# Patient Record
Sex: FEMALE | Race: WHITE | NOT HISPANIC OR LATINO | Employment: OTHER | URBAN - METROPOLITAN AREA
[De-identification: names, ages, dates, MRNs, and addresses within clinical notes are randomized per-mention and may not be internally consistent; named-entity substitution may affect disease eponyms.]

---

## 2024-11-29 ENCOUNTER — HOSPITAL ENCOUNTER (INPATIENT)
Facility: HOSPITAL | Age: 79
LOS: 4 days | Discharge: HOME | DRG: 280 | End: 2024-12-03
Attending: EMERGENCY MEDICINE | Admitting: SPECIALIST
Payer: MEDICARE

## 2024-11-29 ENCOUNTER — DOCUMENTATION ONLY (OUTPATIENT)
Dept: CARDIOLOGY | Facility: CLINIC | Age: 79
End: 2024-11-29
Payer: MEDICARE

## 2024-11-29 DIAGNOSIS — I21.4 NSTEMI (NON-ST ELEVATED MYOCARDIAL INFARCTION): ICD-10-CM

## 2024-11-29 DIAGNOSIS — I50.9 CHF (CONGESTIVE HEART FAILURE): ICD-10-CM

## 2024-11-29 DIAGNOSIS — R06.02 SHORTNESS OF BREATH: ICD-10-CM

## 2024-11-29 DIAGNOSIS — R00.0 REGULAR WIDE QRS COMPLEX TACHYCARDIA: ICD-10-CM

## 2024-11-29 DIAGNOSIS — I25.10 CORONARY ARTERY DISEASE, UNSPECIFIED VESSEL OR LESION TYPE, UNSPECIFIED WHETHER ANGINA PRESENT, UNSPECIFIED WHETHER NATIVE OR TRANSPLANTED HEART: ICD-10-CM

## 2024-11-29 DIAGNOSIS — R07.9 CHEST PAIN: ICD-10-CM

## 2024-11-29 DIAGNOSIS — J96.01 ACUTE HYPOXEMIC RESPIRATORY FAILURE: Primary | ICD-10-CM

## 2024-11-29 PROBLEM — C50.919 BREAST CANCER: Status: ACTIVE | Noted: 2024-11-29

## 2024-11-29 PROBLEM — I10 PRIMARY HYPERTENSION: Status: ACTIVE | Noted: 2024-11-29

## 2024-11-29 PROBLEM — J44.9 COPD (CHRONIC OBSTRUCTIVE PULMONARY DISEASE): Status: ACTIVE | Noted: 2024-11-29

## 2024-11-29 PROBLEM — R79.89 ELEVATED TROPONIN: Status: ACTIVE | Noted: 2024-11-29

## 2024-11-29 PROBLEM — I44.7 LBBB (LEFT BUNDLE BRANCH BLOCK): Status: ACTIVE | Noted: 2024-11-29

## 2024-11-29 PROBLEM — N18.30 CKD (CHRONIC KIDNEY DISEASE), STAGE III: Status: ACTIVE | Noted: 2024-11-29

## 2024-11-29 PROBLEM — D63.8 ANEMIA OF CHRONIC DISEASE: Status: ACTIVE | Noted: 2024-11-29

## 2024-11-29 LAB
ALBUMIN SERPL BCP-MCNC: 3.7 G/DL (ref 3.5–5.2)
ALLENS TEST: ABNORMAL
ALP SERPL-CCNC: 95 U/L (ref 40–150)
ALT SERPL W/O P-5'-P-CCNC: 41 U/L (ref 10–44)
ANION GAP SERPL CALC-SCNC: 11 MMOL/L (ref 8–16)
AORTIC ROOT ANNULUS: 3.17 CM
APTT PPP: 25.1 SEC (ref 21–32)
APTT PPP: 45.2 SEC (ref 21–32)
APTT PPP: 47 SEC (ref 21–32)
ASCENDING AORTA: 2.45 CM
AST SERPL-CCNC: 48 U/L (ref 10–40)
AV INDEX (PROSTH): 0.4
AV MEAN GRADIENT: 21.5 MMHG
AV PEAK GRADIENT: 36 MMHG
AV REGURGITATION PRESSURE HALF TIME: 494.19 MS
AV VALVE AREA BY VELOCITY RATIO: 1.5 CM²
AV VALVE AREA: 1.5 CM²
AV VELOCITY RATIO: 0.4
BACTERIA #/AREA URNS HPF: NORMAL /HPF
BASOPHILS # BLD AUTO: 0.06 K/UL (ref 0–0.2)
BASOPHILS NFR BLD: 1 % (ref 0–1.9)
BILIRUB SERPL-MCNC: 0.8 MG/DL (ref 0.1–1)
BILIRUB UR QL STRIP: NEGATIVE
BNP SERPL-MCNC: 1010 PG/ML (ref 0–99)
BSA FOR ECHO PROCEDURE: 2.02 M2
BUN SERPL-MCNC: 33 MG/DL (ref 8–23)
CALCIUM SERPL-MCNC: 9.7 MG/DL (ref 8.7–10.5)
CHLORIDE SERPL-SCNC: 110 MMOL/L (ref 95–110)
CLARITY UR: CLEAR
CO2 SERPL-SCNC: 20 MMOL/L (ref 23–29)
COLOR UR: YELLOW
CREAT SERPL-MCNC: 2 MG/DL (ref 0.5–1.4)
CV ECHO LV RWT: 0.6 CM
DELSYS: ABNORMAL
DIFFERENTIAL METHOD BLD: ABNORMAL
DOP CALC AO PEAK VEL: 3 M/S
DOP CALC AO VTI: 82.4 CM
DOP CALC LVOT AREA: 3.8 CM2
DOP CALC LVOT DIAMETER: 2.2 CM
DOP CALC LVOT PEAK VEL: 1.2 M/S
DOP CALC LVOT STROKE VOLUME: 123.9 CM3
DOP CALC RVOT PEAK VEL: 1.18 M/S
DOP CALC RVOT VTI: 27.3 CM
DOP CALCLVOT PEAK VEL VTI: 32.6 CM
E WAVE DECELERATION TIME: 193.31 MSEC
E/A RATIO: 0.73
E/E' RATIO: 19.45 M/S
ECHO LV POSTERIOR WALL: 1.5 CM (ref 0.6–1.1)
EJECTION FRACTION: 55 %
EOSINOPHIL # BLD AUTO: 0.1 K/UL (ref 0–0.5)
EOSINOPHIL NFR BLD: 0.8 % (ref 0–8)
EP: 6
ERYTHROCYTE [DISTWIDTH] IN BLOOD BY AUTOMATED COUNT: 16.1 % (ref 11.5–14.5)
ERYTHROCYTE [SEDIMENTATION RATE] IN BLOOD BY WESTERGREN METHOD: 18 MM/H
EST. GFR  (NO RACE VARIABLE): 25 ML/MIN/1.73 M^2
ESTIMATED AVG GLUCOSE: 100 MG/DL (ref 68–131)
FIO2: 40
FRACTIONAL SHORTENING: 30 % (ref 28–44)
GLUCOSE SERPL-MCNC: 139 MG/DL (ref 70–110)
GLUCOSE UR QL STRIP: ABNORMAL
HBA1C MFR BLD: 5.1 % (ref 4–5.6)
HCO3 UR-SCNC: 23.5 MMOL/L (ref 24–28)
HCT VFR BLD AUTO: 30.3 % (ref 37–48.5)
HCV AB SERPL QL IA: NEGATIVE
HEP C VIRUS HOLD SPECIMEN: NORMAL
HGB BLD-MCNC: 9.6 G/DL (ref 12–16)
HGB UR QL STRIP: NEGATIVE
HIV 1+2 AB+HIV1 P24 AG SERPL QL IA: NEGATIVE
HYALINE CASTS #/AREA URNS LPF: 0 /LPF
IMM GRANULOCYTES # BLD AUTO: 0.01 K/UL (ref 0–0.04)
IMM GRANULOCYTES NFR BLD AUTO: 0.2 % (ref 0–0.5)
INR PPP: 1 (ref 0.8–1.2)
INTERVENTRICULAR SEPTUM: 1.4 CM (ref 0.6–1.1)
IP: 12
IVC DIAMETER: 2.24 CM
IVRT: 85.63 MSEC
KETONES UR QL STRIP: NEGATIVE
LA MAJOR: 5.91 CM
LA MINOR: 5.65 CM
LA WIDTH: 5.2 CM
LACTATE SERPL-SCNC: 0.9 MMOL/L (ref 0.5–2.2)
LEFT ATRIUM AREA SYSTOLIC (APICAL 2 CHAMBER): 22.91 CM2
LEFT ATRIUM AREA SYSTOLIC (APICAL 4 CHAMBER): 23.18 CM2
LEFT ATRIUM SIZE: 3.94 CM
LEFT ATRIUM VOLUME INDEX MOD: 39 ML/M2
LEFT ATRIUM VOLUME INDEX: 51.9 ML/M2
LEFT ATRIUM VOLUME MOD: 75.65 ML
LEFT ATRIUM VOLUME: 100.61 CM3
LEFT INTERNAL DIMENSION IN SYSTOLE: 3.5 CM (ref 2.1–4)
LEFT VENTRICLE DIASTOLIC VOLUME INDEX: 60.8 ML/M2
LEFT VENTRICLE DIASTOLIC VOLUME: 117.96 ML
LEFT VENTRICLE END SYSTOLIC VOLUME APICAL 2 CHAMBER: 73.84 ML
LEFT VENTRICLE END SYSTOLIC VOLUME APICAL 4 CHAMBER: 74.49 ML
LEFT VENTRICLE MASS INDEX: 158.1 G/M2
LEFT VENTRICLE SYSTOLIC VOLUME INDEX: 25.4 ML/M2
LEFT VENTRICLE SYSTOLIC VOLUME: 49.23 ML
LEFT VENTRICULAR INTERNAL DIMENSION IN DIASTOLE: 5 CM (ref 3.5–6)
LEFT VENTRICULAR MASS: 306.8 G
LEUKOCYTE ESTERASE UR QL STRIP: NEGATIVE
LV LATERAL E/E' RATIO: 15.29 M/S
LV SEPTAL E/E' RATIO: 26.75 M/S
LVED V (TEICH): 117.96 ML
LVES V (TEICH): 49.23 ML
LVOT MG: 2.69 MMHG
LVOT MV: 0.77 CM/S
LYMPHOCYTES # BLD AUTO: 0.6 K/UL (ref 1–4.8)
LYMPHOCYTES NFR BLD: 9.6 % (ref 18–48)
MCH RBC QN AUTO: 34.2 PG (ref 27–31)
MCHC RBC AUTO-ENTMCNC: 31.7 G/DL (ref 32–36)
MCV RBC AUTO: 108 FL (ref 82–98)
MICROSCOPIC COMMENT: NORMAL
MODE: ABNORMAL
MONOCYTES # BLD AUTO: 0.5 K/UL (ref 0.3–1)
MONOCYTES NFR BLD: 8.8 % (ref 4–15)
MV PEAK A VEL: 1.47 M/S
MV PEAK E VEL: 1.07 M/S
MV STENOSIS PRESSURE HALF TIME: 56.06 MS
MV VALVE AREA P 1/2 METHOD: 3.92 CM2
NEUTROPHILS # BLD AUTO: 4.7 K/UL (ref 1.8–7.7)
NEUTROPHILS NFR BLD: 79.6 % (ref 38–73)
NITRITE UR QL STRIP: NEGATIVE
NRBC BLD-RTO: 0 /100 WBC
OHS QRS DURATION: 148 MS
OHS QTC CALCULATION: 513 MS
PCO2 BLDA: 45.2 MMHG (ref 35–45)
PH SMN: 7.32 [PH] (ref 7.35–7.45)
PH UR STRIP: 6 [PH] (ref 5–8)
PISA AR MAX VEL: 3.83 M/S
PLATELET # BLD AUTO: 227 K/UL (ref 150–450)
PMV BLD AUTO: 10.8 FL (ref 9.2–12.9)
PO2 BLDA: 77 MMHG (ref 80–100)
POC BE: -3 MMOL/L
POC SATURATED O2: 94 % (ref 95–100)
POTASSIUM SERPL-SCNC: 4.7 MMOL/L (ref 3.5–5.1)
PROT SERPL-MCNC: 6.9 G/DL (ref 6–8.4)
PROT UR QL STRIP: ABNORMAL
PROTHROMBIN TIME: 11.4 SEC (ref 9–12.5)
PV MEAN GRADIENT: 3 MMHG
PV PEAK GRADIENT: 7 MMHG
PV PEAK VELOCITY: 1.28 M/S
RA MAJOR: 5.21 CM
RA PRESSURE ESTIMATED: 8 MMHG
RA WIDTH: 2.8 CM
RBC # BLD AUTO: 2.81 M/UL (ref 4–5.4)
RBC #/AREA URNS HPF: 1 /HPF (ref 0–4)
RIGHT VENTRICULAR END-DIASTOLIC DIMENSION: 3.01 CM
SAMPLE: ABNORMAL
SITE: ABNORMAL
SODIUM SERPL-SCNC: 141 MMOL/L (ref 136–145)
SP GR UR STRIP: 1.02 (ref 1–1.03)
STJ: 2.89 CM
TDI LATERAL: 0.07 M/S
TDI SEPTAL: 0.04 M/S
TDI: 0.06 M/S
TRICUSPID ANNULAR PLANE SYSTOLIC EXCURSION: 2.82 CM
TROPONIN I SERPL DL<=0.01 NG/ML-MCNC: 0.09 NG/ML (ref 0–0.03)
TROPONIN I SERPL DL<=0.01 NG/ML-MCNC: 1 NG/ML (ref 0–0.03)
TROPONIN I SERPL DL<=0.01 NG/ML-MCNC: 2.22 NG/ML (ref 0–0.03)
URN SPEC COLLECT METH UR: ABNORMAL
UROBILINOGEN UR STRIP-ACNC: NEGATIVE EU/DL
WBC # BLD AUTO: 5.91 K/UL (ref 3.9–12.7)
WBC #/AREA URNS HPF: 0 /HPF (ref 0–5)
YEAST URNS QL MICRO: NORMAL
Z-SCORE OF LEFT VENTRICULAR DIMENSION IN END DIASTOLE: -0.97
Z-SCORE OF LEFT VENTRICULAR DIMENSION IN END SYSTOLE: 0.25

## 2024-11-29 PROCEDURE — 85730 THROMBOPLASTIN TIME PARTIAL: CPT | Mod: 91 | Performed by: SPECIALIST

## 2024-11-29 PROCEDURE — 83036 HEMOGLOBIN GLYCOSYLATED A1C: CPT | Performed by: EMERGENCY MEDICINE

## 2024-11-29 PROCEDURE — 27000190 HC CPAP FULL FACE MASK W/VALVE

## 2024-11-29 PROCEDURE — 99291 CRITICAL CARE FIRST HOUR: CPT

## 2024-11-29 PROCEDURE — 94640 AIRWAY INHALATION TREATMENT: CPT

## 2024-11-29 PROCEDURE — 25000003 PHARM REV CODE 250: Performed by: NURSE PRACTITIONER

## 2024-11-29 PROCEDURE — 85610 PROTHROMBIN TIME: CPT | Performed by: EMERGENCY MEDICINE

## 2024-11-29 PROCEDURE — 25000003 PHARM REV CODE 250: Performed by: EMERGENCY MEDICINE

## 2024-11-29 PROCEDURE — 99900035 HC TECH TIME PER 15 MIN (STAT)

## 2024-11-29 PROCEDURE — 86803 HEPATITIS C AB TEST: CPT | Performed by: EMERGENCY MEDICINE

## 2024-11-29 PROCEDURE — 93010 ELECTROCARDIOGRAM REPORT: CPT | Mod: ,,, | Performed by: INTERNAL MEDICINE

## 2024-11-29 PROCEDURE — 96374 THER/PROPH/DIAG INJ IV PUSH: CPT

## 2024-11-29 PROCEDURE — 21400001 HC TELEMETRY ROOM

## 2024-11-29 PROCEDURE — 25000242 PHARM REV CODE 250 ALT 637 W/ HCPCS: Performed by: EMERGENCY MEDICINE

## 2024-11-29 PROCEDURE — 25000003 PHARM REV CODE 250: Performed by: STUDENT IN AN ORGANIZED HEALTH CARE EDUCATION/TRAINING PROGRAM

## 2024-11-29 PROCEDURE — 63600175 PHARM REV CODE 636 W HCPCS: Performed by: EMERGENCY MEDICINE

## 2024-11-29 PROCEDURE — 36415 COLL VENOUS BLD VENIPUNCTURE: CPT | Performed by: NURSE PRACTITIONER

## 2024-11-29 PROCEDURE — 84484 ASSAY OF TROPONIN QUANT: CPT | Performed by: EMERGENCY MEDICINE

## 2024-11-29 PROCEDURE — 81000 URINALYSIS NONAUTO W/SCOPE: CPT | Performed by: EMERGENCY MEDICINE

## 2024-11-29 PROCEDURE — 27000221 HC OXYGEN, UP TO 24 HOURS

## 2024-11-29 PROCEDURE — 83605 ASSAY OF LACTIC ACID: CPT | Performed by: EMERGENCY MEDICINE

## 2024-11-29 PROCEDURE — 36415 COLL VENOUS BLD VENIPUNCTURE: CPT | Mod: XB | Performed by: STUDENT IN AN ORGANIZED HEALTH CARE EDUCATION/TRAINING PROGRAM

## 2024-11-29 PROCEDURE — 96375 TX/PRO/DX INJ NEW DRUG ADDON: CPT

## 2024-11-29 PROCEDURE — 36415 COLL VENOUS BLD VENIPUNCTURE: CPT | Performed by: SPECIALIST

## 2024-11-29 PROCEDURE — 99223 1ST HOSP IP/OBS HIGH 75: CPT | Mod: 25,,, | Performed by: INTERNAL MEDICINE

## 2024-11-29 PROCEDURE — 85025 COMPLETE CBC W/AUTO DIFF WBC: CPT | Performed by: EMERGENCY MEDICINE

## 2024-11-29 PROCEDURE — 84484 ASSAY OF TROPONIN QUANT: CPT | Mod: 91 | Performed by: NURSE PRACTITIONER

## 2024-11-29 PROCEDURE — 94660 CPAP INITIATION&MGMT: CPT

## 2024-11-29 PROCEDURE — 25000242 PHARM REV CODE 250 ALT 637 W/ HCPCS: Performed by: NURSE PRACTITIONER

## 2024-11-29 PROCEDURE — 84484 ASSAY OF TROPONIN QUANT: CPT | Mod: 91 | Performed by: PHYSICIAN ASSISTANT

## 2024-11-29 PROCEDURE — 5A09357 ASSISTANCE WITH RESPIRATORY VENTILATION, LESS THAN 24 CONSECUTIVE HOURS, CONTINUOUS POSITIVE AIRWAY PRESSURE: ICD-10-PCS | Performed by: EMERGENCY MEDICINE

## 2024-11-29 PROCEDURE — 87389 HIV-1 AG W/HIV-1&-2 AB AG IA: CPT | Performed by: EMERGENCY MEDICINE

## 2024-11-29 PROCEDURE — 63600175 PHARM REV CODE 636 W HCPCS: Performed by: NURSE PRACTITIONER

## 2024-11-29 PROCEDURE — 93005 ELECTROCARDIOGRAM TRACING: CPT

## 2024-11-29 PROCEDURE — 85730 THROMBOPLASTIN TIME PARTIAL: CPT | Performed by: EMERGENCY MEDICINE

## 2024-11-29 PROCEDURE — 94799 UNLISTED PULMONARY SVC/PX: CPT

## 2024-11-29 PROCEDURE — 80053 COMPREHEN METABOLIC PANEL: CPT | Performed by: EMERGENCY MEDICINE

## 2024-11-29 PROCEDURE — 85730 THROMBOPLASTIN TIME PARTIAL: CPT | Mod: 91 | Performed by: STUDENT IN AN ORGANIZED HEALTH CARE EDUCATION/TRAINING PROGRAM

## 2024-11-29 PROCEDURE — 83880 ASSAY OF NATRIURETIC PEPTIDE: CPT | Performed by: EMERGENCY MEDICINE

## 2024-11-29 RX ORDER — AZELASTINE 1 MG/ML
2 SPRAY, METERED NASAL
COMMUNITY
Start: 2024-10-21

## 2024-11-29 RX ORDER — CLOPIDOGREL BISULFATE 75 MG/1
75 TABLET ORAL DAILY
Status: CANCELLED | OUTPATIENT
Start: 2024-11-29

## 2024-11-29 RX ORDER — SPIRONOLACTONE 25 MG/1
TABLET ORAL
Status: ON HOLD | COMMUNITY
Start: 2024-04-22 | End: 2024-11-29

## 2024-11-29 RX ORDER — NALOXONE HCL 0.4 MG/ML
0.02 VIAL (ML) INJECTION
Status: DISCONTINUED | OUTPATIENT
Start: 2024-11-29 | End: 2024-12-03 | Stop reason: HOSPADM

## 2024-11-29 RX ORDER — HEPARIN SODIUM,PORCINE/D5W 25000/250
0-40 INTRAVENOUS SOLUTION INTRAVENOUS CONTINUOUS
Status: DISCONTINUED | OUTPATIENT
Start: 2024-11-29 | End: 2024-12-02

## 2024-11-29 RX ORDER — ATORVASTATIN CALCIUM 40 MG/1
40 TABLET, FILM COATED ORAL DAILY
Status: DISCONTINUED | OUTPATIENT
Start: 2024-11-29 | End: 2024-12-03 | Stop reason: HOSPADM

## 2024-11-29 RX ORDER — TALC
6 POWDER (GRAM) TOPICAL NIGHTLY PRN
Status: DISCONTINUED | OUTPATIENT
Start: 2024-11-29 | End: 2024-12-03 | Stop reason: HOSPADM

## 2024-11-29 RX ORDER — SPIRONOLACTONE 25 MG/1
25 TABLET ORAL DAILY
Status: DISCONTINUED | OUTPATIENT
Start: 2024-11-29 | End: 2024-11-29

## 2024-11-29 RX ORDER — CLOPIDOGREL BISULFATE 75 MG/1
75 TABLET ORAL
COMMUNITY

## 2024-11-29 RX ORDER — ALPRAZOLAM 0.5 MG/1
0.5 TABLET ORAL DAILY PRN
COMMUNITY

## 2024-11-29 RX ORDER — ONDANSETRON HYDROCHLORIDE 2 MG/ML
8 INJECTION, SOLUTION INTRAVENOUS EVERY 8 HOURS PRN
Status: DISCONTINUED | OUTPATIENT
Start: 2024-11-29 | End: 2024-12-03 | Stop reason: HOSPADM

## 2024-11-29 RX ORDER — ARFORMOTEROL TARTRATE 15 UG/2ML
15 SOLUTION RESPIRATORY (INHALATION) EVERY 12 HOURS
Status: DISCONTINUED | OUTPATIENT
Start: 2024-11-29 | End: 2024-12-03 | Stop reason: HOSPADM

## 2024-11-29 RX ORDER — FUROSEMIDE 40 MG/1
40 TABLET ORAL
COMMUNITY

## 2024-11-29 RX ORDER — PALBOCICLIB 100 MG/1
100 TABLET, FILM COATED ORAL
COMMUNITY
Start: 2024-11-21

## 2024-11-29 RX ORDER — PANTOPRAZOLE SODIUM 40 MG/1
40 TABLET, DELAYED RELEASE ORAL DAILY
Status: DISCONTINUED | OUTPATIENT
Start: 2024-11-29 | End: 2024-12-03 | Stop reason: HOSPADM

## 2024-11-29 RX ORDER — UBIQUINOL 100 MG
1 CAPSULE ORAL
COMMUNITY

## 2024-11-29 RX ORDER — IBUPROFEN 200 MG
24 TABLET ORAL
Status: DISCONTINUED | OUTPATIENT
Start: 2024-11-29 | End: 2024-12-03 | Stop reason: HOSPADM

## 2024-11-29 RX ORDER — SIMETHICONE 80 MG
1 TABLET,CHEWABLE ORAL 4 TIMES DAILY PRN
Status: DISCONTINUED | OUTPATIENT
Start: 2024-11-29 | End: 2024-12-03 | Stop reason: HOSPADM

## 2024-11-29 RX ORDER — DEXTROAMPHETAMINE SACCHARATE, AMPHETAMINE ASPARTATE, DEXTROAMPHETAMINE SULFATE AND AMPHETAMINE SULFATE 2.5; 2.5; 2.5; 2.5 MG/1; MG/1; MG/1; MG/1
TABLET ORAL
COMMUNITY
Start: 2024-10-16

## 2024-11-29 RX ORDER — IPRATROPIUM BROMIDE AND ALBUTEROL SULFATE 2.5; .5 MG/3ML; MG/3ML
SOLUTION RESPIRATORY (INHALATION)
COMMUNITY

## 2024-11-29 RX ORDER — ACETAMINOPHEN 325 MG/1
650 TABLET ORAL EVERY 4 HOURS PRN
Status: DISCONTINUED | OUTPATIENT
Start: 2024-11-29 | End: 2024-11-29

## 2024-11-29 RX ORDER — HYDROCODONE BITARTRATE AND ACETAMINOPHEN 10; 325 MG/1; MG/1
TABLET ORAL
COMMUNITY

## 2024-11-29 RX ORDER — FLUTICASONE FUROATE, UMECLIDINIUM BROMIDE AND VILANTEROL TRIFENATATE 100; 62.5; 25 UG/1; UG/1; UG/1
1 POWDER RESPIRATORY (INHALATION)
COMMUNITY
Start: 2024-11-19

## 2024-11-29 RX ORDER — FUROSEMIDE 10 MG/ML
40 INJECTION INTRAMUSCULAR; INTRAVENOUS EVERY 12 HOURS
Status: DISCONTINUED | OUTPATIENT
Start: 2024-11-29 | End: 2024-12-01

## 2024-11-29 RX ORDER — CARVEDILOL 12.5 MG/1
25 TABLET ORAL 2 TIMES DAILY WITH MEALS
Status: CANCELLED | OUTPATIENT
Start: 2024-11-29

## 2024-11-29 RX ORDER — BUDESONIDE 0.5 MG/2ML
0.5 INHALANT ORAL EVERY 12 HOURS
Status: DISCONTINUED | OUTPATIENT
Start: 2024-11-29 | End: 2024-12-03 | Stop reason: HOSPADM

## 2024-11-29 RX ORDER — ASPIRIN 81 MG/1
1 TABLET ORAL EVERY MORNING
COMMUNITY

## 2024-11-29 RX ORDER — AMOXICILLIN 250 MG
1 CAPSULE ORAL 2 TIMES DAILY PRN
Status: DISCONTINUED | OUTPATIENT
Start: 2024-11-29 | End: 2024-12-03 | Stop reason: HOSPADM

## 2024-11-29 RX ORDER — CETIRIZINE HYDROCHLORIDE 10 MG/1
10 TABLET ORAL
COMMUNITY

## 2024-11-29 RX ORDER — GABAPENTIN 100 MG/1
100 CAPSULE ORAL NIGHTLY
Status: DISCONTINUED | OUTPATIENT
Start: 2024-11-29 | End: 2024-12-03 | Stop reason: HOSPADM

## 2024-11-29 RX ORDER — ISOSORBIDE MONONITRATE 30 MG/1
30 TABLET, EXTENDED RELEASE ORAL
COMMUNITY

## 2024-11-29 RX ORDER — GABAPENTIN 300 MG/1
CAPSULE ORAL
COMMUNITY
Start: 2024-01-27

## 2024-11-29 RX ORDER — IBUPROFEN 200 MG
16 TABLET ORAL
Status: DISCONTINUED | OUTPATIENT
Start: 2024-11-29 | End: 2024-12-03 | Stop reason: HOSPADM

## 2024-11-29 RX ORDER — ALUMINUM HYDROXIDE, MAGNESIUM HYDROXIDE, AND SIMETHICONE 1200; 120; 1200 MG/30ML; MG/30ML; MG/30ML
30 SUSPENSION ORAL 4 TIMES DAILY PRN
Status: DISCONTINUED | OUTPATIENT
Start: 2024-11-29 | End: 2024-12-03 | Stop reason: HOSPADM

## 2024-11-29 RX ORDER — CARVEDILOL 25 MG/1
TABLET ORAL
COMMUNITY
Start: 2024-03-28

## 2024-11-29 RX ORDER — PANTOPRAZOLE SODIUM 40 MG/1
TABLET, DELAYED RELEASE ORAL
COMMUNITY
Start: 2024-10-08

## 2024-11-29 RX ORDER — IPRATROPIUM BROMIDE AND ALBUTEROL SULFATE 2.5; .5 MG/3ML; MG/3ML
3 SOLUTION RESPIRATORY (INHALATION) EVERY 4 HOURS PRN
Status: DISCONTINUED | OUTPATIENT
Start: 2024-11-29 | End: 2024-12-03 | Stop reason: HOSPADM

## 2024-11-29 RX ORDER — HYDROCODONE BITARTRATE AND ACETAMINOPHEN 10; 325 MG/1; MG/1
1 TABLET ORAL EVERY 6 HOURS PRN
Status: DISCONTINUED | OUTPATIENT
Start: 2024-11-29 | End: 2024-12-03 | Stop reason: HOSPADM

## 2024-11-29 RX ORDER — ASPIRIN 81 MG/1
81 TABLET ORAL EVERY MORNING
Status: CANCELLED | OUTPATIENT
Start: 2024-11-30

## 2024-11-29 RX ORDER — IPRATROPIUM BROMIDE AND ALBUTEROL SULFATE 2.5; .5 MG/3ML; MG/3ML
3 SOLUTION RESPIRATORY (INHALATION)
Status: COMPLETED | OUTPATIENT
Start: 2024-11-29 | End: 2024-11-29

## 2024-11-29 RX ORDER — ISOSORBIDE MONONITRATE 30 MG/1
30 TABLET, EXTENDED RELEASE ORAL DAILY
Status: CANCELLED | OUTPATIENT
Start: 2024-11-29

## 2024-11-29 RX ORDER — METHYLPREDNISOLONE SOD SUCC 125 MG
125 VIAL (EA) INJECTION
Status: COMPLETED | OUTPATIENT
Start: 2024-11-29 | End: 2024-11-29

## 2024-11-29 RX ORDER — ACETAMINOPHEN 325 MG/1
650 TABLET ORAL EVERY 4 HOURS PRN
Status: DISCONTINUED | OUTPATIENT
Start: 2024-11-29 | End: 2024-12-02 | Stop reason: SDUPTHER

## 2024-11-29 RX ORDER — ROSUVASTATIN CALCIUM 20 MG/1
20 TABLET, COATED ORAL
COMMUNITY

## 2024-11-29 RX ORDER — VERICIGUAT 5 MG/1
1 TABLET, FILM COATED ORAL
COMMUNITY

## 2024-11-29 RX ORDER — DAPAGLIFLOZIN 10 MG/1
10 TABLET, FILM COATED ORAL
COMMUNITY

## 2024-11-29 RX ORDER — ASPIRIN 325 MG
325 TABLET ORAL
Status: COMPLETED | OUTPATIENT
Start: 2024-11-29 | End: 2024-11-29

## 2024-11-29 RX ORDER — GLUCAGON 1 MG
1 KIT INJECTION
Status: DISCONTINUED | OUTPATIENT
Start: 2024-11-29 | End: 2024-12-03 | Stop reason: HOSPADM

## 2024-11-29 RX ORDER — METOPROLOL TARTRATE 25 MG/1
25 TABLET, FILM COATED ORAL
Status: ON HOLD | COMMUNITY
End: 2024-11-29

## 2024-11-29 RX ORDER — ALPRAZOLAM 0.5 MG/1
0.5 TABLET ORAL DAILY PRN
Status: CANCELLED | OUTPATIENT
Start: 2024-11-29

## 2024-11-29 RX ORDER — FUROSEMIDE 10 MG/ML
60 INJECTION INTRAMUSCULAR; INTRAVENOUS
Status: COMPLETED | OUTPATIENT
Start: 2024-11-29 | End: 2024-11-29

## 2024-11-29 RX ORDER — PROMETHAZINE HYDROCHLORIDE AND DEXTROMETHORPHAN HYDROBROMIDE 6.25; 15 MG/5ML; MG/5ML
SYRUP ORAL EVERY 8 HOURS PRN
COMMUNITY
Start: 2024-10-21

## 2024-11-29 RX ADMIN — IPRATROPIUM BROMIDE AND ALBUTEROL SULFATE 3 ML: 2.5; .5 SOLUTION RESPIRATORY (INHALATION) at 06:11

## 2024-11-29 RX ADMIN — FUROSEMIDE 60 MG: 10 INJECTION, SOLUTION INTRAMUSCULAR; INTRAVENOUS at 06:11

## 2024-11-29 RX ADMIN — ASPIRIN 325 MG ORAL TABLET 325 MG: 325 PILL ORAL at 06:11

## 2024-11-29 RX ADMIN — ATORVASTATIN CALCIUM 40 MG: 40 TABLET, FILM COATED ORAL at 09:11

## 2024-11-29 RX ADMIN — Medication 6 MG: at 11:11

## 2024-11-29 RX ADMIN — HEPARIN SODIUM 12 UNITS/KG/HR: 10000 INJECTION, SOLUTION INTRAVENOUS at 08:11

## 2024-11-29 RX ADMIN — GABAPENTIN 100 MG: 100 CAPSULE ORAL at 09:11

## 2024-11-29 RX ADMIN — NITROGLYCERIN 1 INCH: 20 OINTMENT TOPICAL at 06:11

## 2024-11-29 RX ADMIN — FUROSEMIDE 40 MG: 10 INJECTION, SOLUTION INTRAMUSCULAR; INTRAVENOUS at 05:11

## 2024-11-29 RX ADMIN — PANTOPRAZOLE SODIUM 40 MG: 40 TABLET, DELAYED RELEASE ORAL at 09:11

## 2024-11-29 RX ADMIN — HYDROCODONE BITARTRATE AND ACETAMINOPHEN 1 TABLET: 10; 325 TABLET ORAL at 06:11

## 2024-11-29 RX ADMIN — METHYLPREDNISOLONE SODIUM SUCCINATE 125 MG: 125 INJECTION, POWDER, FOR SOLUTION INTRAMUSCULAR; INTRAVENOUS at 06:11

## 2024-11-29 RX ADMIN — ARFORMOTEROL TARTRATE 15 MCG: 15 SOLUTION RESPIRATORY (INHALATION) at 07:11

## 2024-11-29 RX ADMIN — BUDESONIDE INHALATION 0.5 MG: 0.5 SUSPENSION RESPIRATORY (INHALATION) at 07:11

## 2024-11-29 NOTE — ASSESSMENT & PLAN NOTE
Patient with Hypoxic Respiratory failure which is Acute.  she is not on home oxygen. Supplemental oxygen was provided and noted- Oxygen Concentration (%):  [40] 40    .   Signs/symptoms of respiratory failure include- tachypnea, increased work of breathing, respiratory distress, and use of accessory muscles. Contributing diagnoses includes - CHF Labs and images were reviewed. Patient Has recent ABG, which has been reviewed. Will treat underlying causes and adjust management of respiratory failure as follows-   --Treat underlying CHF

## 2024-11-29 NOTE — ASSESSMENT & PLAN NOTE
Patient with known CAD s/p stent placement. Will continue Statin and monitor for S/Sx of angina/ACS. Continue to monitor on telemetry.   --patient is followed by Dr. Brant Lundberg  --S/p PCI 10/2023

## 2024-11-29 NOTE — H&P
[facility] Medicine  History & Physical    Patient Name: Abigail Babb  MRN: [id number]  Patient Class: IP- Inpatient  Admission Date: 11/29/2024  Attending Physician: Gavi Marques MD   Primary Care Provider: No primary care provider on file.         Patient information was obtained from patient and ER records.     Subjective:     Principal Problem:Acute on chronic congestive heart failure    Chief Complaint:   Chief Complaint   Patient presents with    Shortness of Breath     Per EMS, pt was 68% on RA upon arrival. Used x2 albuterol PTA with no relief. Give 3 sprays nitro en route. Arrived on BiPAP. PMHx CHF COPD        HPI: Abigail Babb is a 79 year old female with comorbid conditions of COPD, CHF, COPD and CAD who presented to ED for further evaluation of respiratory distress and hypoxia that began around 0400 this morning. Patient woke up very dyspneic and was not able to catch her breath. Per EMS, patient oxygen was 68 requiring NPPV via BIPAP. She does not wear supplemental oxygen. Patient is followed by Dr. Gama for CAD, CHF, and valvular disease. She does not take Lasix regularly due to CKD. She went to [place] yesterday for Thanksgiving dinner and did not restrict her sodium intake. She denies any symptoms prior to going to bed.     Imaging in ED consistent with CHF, and patient received 60mg IV Lasix. She has been weaned from Bipap to 3L nasal cannula. Elevated troponin and BNP noted.HM consulted for admission.     Past Medical History:   Diagnosis Date    Breast cancer     CHF (congestive heart failure)     COPD (chronic obstructive pulmonary disease)     Hypertension        Surgical History  Left Mastectomy  Appendectomy  Carpal Tunnel Release  Tonsilllectomy    Review of patient's allergies indicates:   Allergen Reactions    Penicillins        No current facility-administered medications on file prior to encounter.     Current Outpatient Medications on  File Prior to Encounter   Medication Sig    albuterol-ipratropium (DUO-NEB) 2.5 mg-0.5 mg/3 mL nebulizer solution 3 ML  .    aspirin (ECOTRIN) 81 MG EC tablet Take 1 tablet by mouth every morning.    azelastine (ASTELIN) 137 mcg (0.1 %) nasal spray 2 sprays.    carvediloL (COREG) 25 MG tablet TAKE ONE TABLET BY MOUTH TWICE DAILY THANK YOU    cetirizine (ZYRTEC) 10 MG tablet Take 10 mg by mouth.    clopidogreL (PLAVIX) 75 mg tablet Take 75 mg by mouth.    coQ10, ubiquinol, 100 mg Cap Take 1 capsule by mouth.    dextroamphetamine-amphetamine 10 mg Tab TAKE ONE TABLET BY MOUTH EVERY MORNING THANK YOU    epoetin soha (PROCRIT) 10,000 unit/mL injection Inject 10,000 Units into the skin.    FARXIGA 10 mg tablet Take 10 mg by mouth.    furosemide (LASIX) 40 MG tablet Take 40 mg by mouth.    gabapentin (NEURONTIN) 300 MG capsule TAKE ONE CAPSULE BY MOUTH EVERY NIGHT AT BEDTIME THANK YOU    HYDROcodone-acetaminophen (NORCO)  mg per tablet Take by mouth.    IBRANCE 100 mg Tab Take 100 mg by mouth.    isosorbide mononitrate (IMDUR) 30 MG 24 hr tablet Take 30 mg by mouth.    pantoprazole (PROTONIX) 40 MG tablet TAKE ONE TABLET BY MOUTH DAILY THANK YOU    promethazine-dextromethorphan (PROMETHAZINE-DM) 6.25-15 mg/5 mL Syrp Take by mouth every 8 (eight) hours as needed.    TRELEGY ELLIPTA 100-62.5-25 mcg DsDv Inhale 1 puff into the lungs.    VERQUVO 5 mg Tab Take 1 tablet by mouth.    [DISCONTINUED] spironolactone (ALDACTONE) 25 MG tablet TAKE ONE TABLET BY MOUTH DAILY THANK YOU    ALPRAZolam (XANAX) 0.5 MG tablet Take 0.5 mg by mouth daily as needed.    calcium-vitamin D 250 mg-2.5 mcg (100 unit) per tablet Take 1 tablet by mouth once daily. (Patient not taking: Reported on 11/29/2024)    rosuvastatin (CRESTOR) 20 MG tablet Take 20 mg by mouth.    [DISCONTINUED] metoprolol tartrate (LOPRESSOR) 25 MG tablet Take 25 mg by mouth.     Family History    None       Tobacco Use    Smoking status: Former     Types: Cigarettes     Smokeless tobacco: Never   Substance and Sexual Activity    Alcohol use: Not Currently    Drug use: Never    Sexual activity: Not on file     Review of Systems   Constitutional:  Positive for diaphoresis. Negative for activity change, fatigue and unexpected weight change.   HENT:  Negative for congestion, ear pain and sore throat.    Eyes: Negative.    Respiratory:  Positive for shortness of breath. Negative for wheezing.    Cardiovascular:  Negative for chest pain and palpitations.   Gastrointestinal:  Negative for abdominal pain, constipation, diarrhea and vomiting.   Endocrine: Negative.    Genitourinary:  Negative for flank pain, hematuria and urgency.   Musculoskeletal:  Negative for joint swelling and neck pain.   Skin:  Negative for pallor.   Neurological:  Negative for seizures, syncope and light-headedness.   Hematological: Negative.    Psychiatric/Behavioral: Negative.       Objective:     Vital Signs (Most Recent):  Temp: 98.1 °F (36.7 °C) (11/29/24 0847)  Pulse: 77 (11/29/24 0847)  Resp: (!) 22 (11/29/24 0847)  BP: (!) 187/76 (11/29/24 0847)  SpO2: 96 % (11/29/24 0847) Vital Signs (24h Range):  Temp:  [97.9 °F (36.6 °C)-98.1 °F (36.7 °C)] 98.1 °F (36.7 °C)  Pulse:  [62-93] 77  Resp:  [20-27] 22  SpO2:  [96 %-100 %] 96 %  BP: (122-187)/(58-90) 187/76     Weight: 83.5 kg (184 lb 1.4 oz)  Body mass index is 32.61 kg/m².     Physical Exam  Constitutional:       Appearance: She is well-developed.   HENT:      Head: Normocephalic and atraumatic.   Cardiovascular:      Rate and Rhythm: Normal rate and regular rhythm.      Heart sounds: Normal heart sounds. No murmur heard.  Pulmonary:      Effort: Tachypnea and respiratory distress (improved) present.      Breath sounds: Examination of the right-lower field reveals rales. Examination of the left-lower field reveals rales. Rales present.      Comments: Nasal cannula in place  Abdominal:      General: Bowel sounds are normal. There is no distension.       Palpations: Abdomen is soft.      Tenderness: There is no abdominal tenderness.   Musculoskeletal:         General: Normal range of motion.      Cervical back: Normal range of motion and neck supple.   Skin:     General: Skin is warm and dry.   Neurological:      Mental Status: She is alert and oriented to person, place, and time.                Significant Labs: All pertinent labs within the past 24 hours have been reviewed.  CBC:   Recent Labs   Lab 11/29/24  0635   WBC 5.91   HGB 9.6*   HCT 30.3*        CMP:   Recent Labs   Lab 11/29/24  0635      K 4.7      CO2 20*   *   BUN 33*   CREATININE 2.0*   CALCIUM 9.7   PROT 6.9   ALBUMIN 3.7   BILITOT 0.8   ALKPHOS 95   AST 48*   ALT 41   ANIONGAP 11       Significant Imaging:   Imaging Results              X-Ray Chest AP Portable (Final result)  Result time 11/29/24 07:35:57      Final result by Sunil Harvey MD (11/29/24 07:35:57)                   Impression:      As above.      Electronically signed by: Sunil Harvey  Date:    11/29/2024  Time:    07:35               Narrative:    EXAMINATION:  XR CHEST AP PORTABLE    CLINICAL HISTORY:  Asthma;.    TECHNIQUE:  Single frontal portable view of the chest was performed.    COMPARISON:  None    FINDINGS:  Moderate to severe cardiomegaly.    Pulmonary vascular congestion with interstitial edema suspicious for heart failure.  Slightly more confluent opacification right perihilar region raising question of superimposed infection/pneumonia.  No pleural effusion or pneumothorax.                                      Assessment/Plan:     * Acute on chronic congestive heart failure  Presents with acute dyspnea and respiratory distress initially requiring NIPPV via BIPAP. CXR consistent with CHF and diuresed with positive response. Patient has been weaned to 3L nasal cannula. She does not take Lasix regularly due to kidney function.     Patient has  unknown type  heart failure that is Acute on chronic.  On presentation their CHF was decompensated. Evidence of decompensated CHF on presentation includes: crackles on lung auscultation, paroxysmal nocturnal dyspnea (PND), dyspnea on exertion (CHANCE), and shortness of breath. The etiology of their decompensation is likely dietary indiscretion. Most recent BNP and echo results are listed below.  Recent Labs     11/29/24  0635   BNP 1,010*     Latest ECHO  Results for orders placed during the hospital encounter of 11/29/24    Echo Saline Bubble? No    Interpretation Summary    Left Ventricle: The left ventricle is normal in size. Normal wall thickness. There is concentric hypertrophy. Regional wall motion abnormalities present. Septal motion is consistent with bundle branch block. There is normal systolic function with a visually estimated ejection fraction of 55 - 60%. Ejection fraction is approximately 55%. Grade II diastolic dysfunction.    Right Ventricle: Normal right ventricular cavity size. Wall thickness is normal. Systolic function is normal.    Left Atrium: Left atrium is severely dilated.    Aortic Valve: There is mild stenosis. Aortic valve area by VTI is 1.5 cm². Aortic valve peak velocity is 3.0 m/s. Mean gradient is 21.5 mmHg. The dimensionless index is 0.40. There is mild aortic regurgitation.    Mitral Valve: There is mild to moderate regurgitation.    IVC/SVC: Intermediate venous pressure at 8 mmHg.    Current Heart Failure Medications  , ,   , , Oral  , , Oral  , , Oral  furosemide injection 40 mg, Every 12 hours, Intravenous  spironolactone tablet 25 mg, Daily, Oral    Plan  - Monitor strict I&Os and daily weights.    - Place on telemetry  - Low sodium diet  - Place on fluid restriction of 1.5 L.   - Cardiology has been consulted  - The patient's volume status is uncontrolled  -follow Echocardiogram results, francesca        Anemia of chronic disease  Anemia is likely due to chronic disease due to Chronic Kidney Disease. Most recent hemoglobin and  hematocrit are listed below.  Recent Labs     11/29/24  0635   HGB 9.6*   HCT 30.3*     Plan  - Monitor serial CBC: Daily  - Transfuse PRBC if patient becomes hemodynamically unstable, symptomatic or H/H drops below 7/21.  - Patient has not received any PRBC transfusions to date  - Patient's anemia is currently stable      COPD (chronic obstructive pulmonary disease)  Patient's COPD is controlled currently.  Patient is currently off COPD Pathway. Continue scheduled inhalers Steroids and Supplemental oxygen and monitor respiratory status closely.     CKD (chronic kidney disease), stage III  Creatine stable for now. BMP reviewed- noted Estimated Creatinine Clearance: 24.1 mL/min (A) (based on SCr of 2 mg/dL (H)). according to latest data. Based on current GFR, CKD stage is stage 3 - GFR 30-59.    --Patient is followed by Dr. Hair  --Hold Farxiga now for proteinuria   --Kidney function at baseline.   --Monitor UOP and serial BMP and adjust therapy as needed.   --Renally dose meds. Avoid nephrotoxic medications and procedures.    Breast cancer  Left mastectomy with lumph node dissection in 6/2023  --takes Ibrance and Faslodex injection.   --Followed by Dr. Breaux    Primary hypertension  Patients blood pressure range in the last 24 hours was: BP  Min: 122/58  Max: 187/76.The patient's inpatient anti-hypertensive regimen is listed below:  Current Antihypertensives  nitroGLYCERIN 2% TD oint ointment 1 inch, Every 8 hours, Topical (Top)  , ,   , , Oral  , , Oral  furosemide injection 40 mg, Every 12 hours, Intravenous  spironolactone tablet 25 mg, Daily, Oral    Plan  - BP is controlled, no changes needed to their regimen      LBBB (left bundle branch block)  Noted on EKG  --patient has a history of CAD  --followed by Dr. Gama  Cardiology consulted      CAD (coronary artery disease)  Patient with known CAD s/p stent placement. Will continue Statin and monitor for S/Sx of angina/ACS. Continue to monitor on  telemetry.   --patient is followed by Dr. Brant Lundberg  --S/p PCI 10/2023    Elevated troponin  Recent Labs     11/29/24  0635 11/29/24  0903   TROPONINI 0.092* 1.001*   ?demand ischemia from CHF versus concern for cardiac ischemia  Follow trends  Echocardiogram pending  Heparin infusion initiated in ED  Cardiology input appreciated    Acute respiratory failure with hypoxia  Patient with Hypoxic Respiratory failure which is Acute.  she is not on home oxygen. Supplemental oxygen was provided and noted- Oxygen Concentration (%):  [40] 40    .   Signs/symptoms of respiratory failure include- tachypnea, increased work of breathing, respiratory distress, and use of accessory muscles. Contributing diagnoses includes - CHF Labs and images were reviewed. Patient Has recent ABG, which has been reviewed. Will treat underlying causes and adjust management of respiratory failure as follows-   --Treat underlying CHF      VTE Risk Mitigation (From admission, onward)           Ordered     IP VTE HIGH RISK PATIENT  Once         11/29/24 1159     Place sequential compression device  Until discontinued         11/29/24 1159     heparin 25,000 units in dextrose 5% (100 units/ml) IV bolus from bag LOW INTENSITY nomogram - [facility]  As needed (PRN)        Question:  Heparin Infusion Adjustment (DO NOT MODIFY ANSWER)  Answer:  \\[email or web address]\epic\Images\Pharmacy\HeparinInfusions\heparin LOW INTENSITY nomogram for [facility] [id number].pdf    11/29/24 0725     heparin 25,000 units in dextrose 5% (100 units/ml) IV bolus from bag LOW INTENSITY nomogram - [facility]  As needed (PRN)        Question:  Heparin Infusion Adjustment (DO NOT MODIFY ANSWER)  Answer:  [email or web address]\epic\Images\Pharmacy\HeparinInfusions\heparin LOW INTENSITY nomogram for [facility] [id number]    11/29/24 0725     heparin 25,000 units in dextrose 5% 250 mL (100 units/mL) infusion LOW INTENSITY nomogram - [facility]  Continuous        Question:  Begin at (units/kg/hr)  Answer:  12    11/29/24 0797                                Abigail Babb is a 79 year old female with comorbid conditions of COPD, CHF, COPD and CAD who presented to ED for further evaluation of respiratory distress and hypoxia that began around 0400 this morning. Patient woke up very dyspneic and was not able to catch her breath. Per EMS, patient oxygen was 68 requiring NPPV via BIPAP. She does not wear supplemental oxygen. Patient is followed by Dr. Gama for CAD, CHF, and valvular disease. She does not take Lasix regularly due to CKD. She went to [place] yesterday for Thanksgiving dinner and did not restrict her sodium intake. She denies any symptoms prior to going to bed.     Imaging in ED consistent with CHF, and patient received 60mg IV Lasix. She has been weaned from Bipap to 3L nasal cannula. Elevated troponin and BNP noted.    Chronic obstructive pulmonary disease, unspecified COPD type (HCC)  --Telegy home prescription.     Breast cancer metastasized to intrathoracic lymph node, unspecified laterality (HCC)  Left mastectomy with lumph node dissection in 6/2023    --takes Ibrance and Faslodex injection.   Has been to of medications of one wek.   Will need to follow upw [facility] Dr. Breaux    Congestive heart failure, unspecified HF chronicity, unspecified heart failure type (HCC)     CKD stage III  Farxdiga for proteinuria    CAD,   PCI stenting 10/2023 by Dr. Brant VALLES    Anemia of chronic disease  Irone therapy (CARMELO)      Pedro Pablo Mclaughlin NP  Department of Hospital Medicine  [facility] - Telemetry ([facility])

## 2024-11-29 NOTE — SUBJECTIVE & OBJECTIVE
Past Medical History:   Diagnosis Date    Breast cancer     CHF (congestive heart failure)     COPD (chronic obstructive pulmonary disease)     Hypertension        History reviewed. No pertinent surgical history.    Review of patient's allergies indicates:   Allergen Reactions    Penicillins        No current facility-administered medications on file prior to encounter.     Current Outpatient Medications on File Prior to Encounter   Medication Sig    azelastine (ASTELIN) 137 mcg (0.1 %) nasal spray 2 sprays.    carvediloL (COREG) 25 MG tablet TAKE ONE TABLET BY MOUTH TWICE DAILY THANK YOU    dextroamphetamine-amphetamine 10 mg Tab TAKE ONE TABLET BY MOUTH EVERY MORNING THANK YOU    furosemide (LASIX) 40 MG tablet Take 40 mg by mouth.    gabapentin (NEURONTIN) 300 MG capsule TAKE ONE CAPSULE BY MOUTH EVERY NIGHT AT BEDTIME THANK YOU    IBRANCE 100 mg Tab Take 100 mg by mouth.    pantoprazole (PROTONIX) 40 MG tablet TAKE ONE TABLET BY MOUTH DAILY THANK YOU    promethazine-dextromethorphan (PROMETHAZINE-DM) 6.25-15 mg/5 mL Syrp Take by mouth every 8 (eight) hours as needed.    spironolactone (ALDACTONE) 25 MG tablet TAKE ONE TABLET BY MOUTH DAILY THANK YOU    TRELEGY ELLIPTA 100-62.5-25 mcg DsDv Inhale 1 puff into the lungs.    albuterol-ipratropium (DUO-NEB) 2.5 mg-0.5 mg/3 mL nebulizer solution 3 ML  .    ALPRAZolam (XANAX) 0.5 MG tablet Take 0.5 mg by mouth daily as needed.    aspirin (ECOTRIN) 81 MG EC tablet Take 1 tablet by mouth every morning.    calcium-vitamin D 250 mg-2.5 mcg (100 unit) per tablet Take 1 tablet by mouth once daily.    cetirizine (ZYRTEC) 10 MG tablet Take 10 mg by mouth.    clopidogreL (PLAVIX) 75 mg tablet Take 75 mg by mouth.    coQ10, ubiquinol, 100 mg Cap Take 1 capsule by mouth.    epoetin soha (PROCRIT) 10,000 unit/mL injection Inject 10,000 Units into the skin.    FARXIGA 10 mg tablet Take 10 mg by mouth.    HYDROcodone-acetaminophen (NORCO)  mg per tablet Take by mouth.     isosorbide mononitrate (IMDUR) 30 MG 24 hr tablet Take 30 mg by mouth.    metoprolol tartrate (LOPRESSOR) 25 MG tablet Take 25 mg by mouth.    rosuvastatin (CRESTOR) 20 MG tablet Take 20 mg by mouth.    VERQUVO 5 mg Tab Take 1 tablet by mouth.     Family History    None       Tobacco Use    Smoking status: Former     Types: Cigarettes    Smokeless tobacco: Never   Substance and Sexual Activity    Alcohol use: Not Currently    Drug use: Never    Sexual activity: Not on file     Review of Systems   Constitutional: Positive for malaise/fatigue.   HENT: Negative.     Eyes: Negative.    Cardiovascular:  Positive for dyspnea on exertion and orthopnea.   Respiratory:  Positive for shortness of breath.    Endocrine: Negative.    Hematologic/Lymphatic: Negative.    Skin: Negative.    Musculoskeletal: Negative.    Gastrointestinal: Negative.    Genitourinary: Negative.    Neurological: Negative.    Psychiatric/Behavioral: Negative.     Allergic/Immunologic: Negative.      Objective:     Vital Signs (Most Recent):  Temp: 97.9 °F (36.6 °C) (11/29/24 0626)  Pulse: 68 (11/29/24 0800)  Resp: (!) 24 (11/29/24 0800)  BP: (!) 145/67 (11/29/24 0800)  SpO2: 96 % (11/29/24 0800) Vital Signs (24h Range):  Temp:  [97.9 °F (36.6 °C)] 97.9 °F (36.6 °C)  Pulse:  [68-93] 68  Resp:  [20-27] 24  SpO2:  [96 %-100 %] 96 %  BP: (122-156)/(58-90) 145/67     Weight: 91.8 kg (202 lb 6.1 oz)  Body mass index is 35.86 kg/m².    SpO2: 96 %         Intake/Output Summary (Last 24 hours) at 11/29/2024 0828  Last data filed at 11/29/2024 0748  Gross per 24 hour   Intake --   Output 450 ml   Net -450 ml       Lines/Drains/Airways       Drain  Duration                  Urethral Catheter 11/29/24 0643 Silicone 16 Fr. <1 day              Peripheral Intravenous Line  Duration                  Peripheral IV - Single Lumen 11/29/24 0630 20 G Posterior;Right Hand <1 day         Peripheral IV - Single Lumen 11/29/24 0742 20 G Right Antecubital <1 day                      Physical Exam  Vitals and nursing note reviewed.   Constitutional:       Appearance: She is ill-appearing.      Comments: ON supplemental O2   HENT:      Head: Normocephalic and atraumatic.   Eyes:      Pupils: Pupils are equal, round, and reactive to light.   Neck:      Vascular: JVD present.   Cardiovascular:      Rate and Rhythm: Normal rate and regular rhythm.      Heart sounds: S1 normal and S2 normal. Murmur heard.      Harsh midsystolic murmur is present at the upper right sternal border radiating to the neck.   Pulmonary:      Breath sounds: Rhonchi and rales present.   Abdominal:      Palpations: Abdomen is soft.   Musculoskeletal:      Right lower leg: Edema present.      Left lower leg: Edema present.   Skin:     General: Skin is warm and dry.   Neurological:      General: No focal deficit present.      Mental Status: She is oriented to person, place, and time.   Psychiatric:         Mood and Affect: Mood normal.         Behavior: Behavior normal.         Thought Content: Thought content normal.          Significant Labs: CMP   Recent Labs   Lab 11/29/24  0635      K 4.7      CO2 20*   *   BUN 33*   CREATININE 2.0*   CALCIUM 9.7   PROT 6.9   ALBUMIN 3.7   BILITOT 0.8   ALKPHOS 95   AST 48*   ALT 41   ANIONGAP 11   , CBC   Recent Labs   Lab 11/29/24  0635   WBC 5.91   HGB 9.6*   HCT 30.3*      , Troponin   Recent Labs   Lab 11/29/24  0635   TROPONINI 0.092*   , and All pertinent lab results from the last 24 hours have been reviewed.    Significant Imaging: Echocardiogram: Transthoracic echo (TTE) complete (Cupid Only): No results found for this or any previous visit. and EKG: Reviewed

## 2024-11-29 NOTE — ASSESSMENT & PLAN NOTE
-Noted on EKG, no prior to compare  -TTE pending  -Continue OMT  -Records requested from Dr. Gama to review recent cath

## 2024-11-29 NOTE — Clinical Note
The right groin and right brachial was prepped. The site was prepped with ChloraPrep. The site was clipped. The patient was draped.

## 2024-11-29 NOTE — ASSESSMENT & PLAN NOTE
-See plan under SOB  -TTE from 7/23 with normal EF, repeat pending  -Admits to eating Thanksgiving food  -On Lasix prn as OP, likely needs daily dose  -Continue OMT-BB, Farxiga  -Aldactone if creatinine permits, not on ACEi/ARB or Entresto as OP

## 2024-11-29 NOTE — PROGRESS NOTES
"  [facility]) nurse navigator notified of [facility] candidate in need of education and introduction to 4-6 week program.      PT aao x 3 while lying in bed  with spouse at bedside. Introduced self to pt as HFTC nurse navigator.     Patient given "Home Care Guide for Heart Failure Patients" , "[facility]" flyer and "Daily weight and symptom tracker".  Encouraged pt and caregiver to review information.      Reviewed the following key points of HFTCC program with pt and family:   1.) Take your medications as directed.    2.) Weight yourself daily   3.) Follow low salt and limited fluid diet.    4.) Stop smoking and start exercising   5.) Go to your appointments and call your team.      Pt reminded to follow Symptom tracker and to call at the onset of symptoms according to tracker.     Reviewed plan for follow up once discharged to include phone calls, in person and virtual visits to assist pt optimizing their heart failure medication regimen and encouraging healthy lifestyle modifications.  Reminded pt that program will assist them over the next 4-6 weeks and then patient will be transferred to long term care provider .  Reminded pt how to contact [facility] navigator via phone and or via [employer].     Pt currently following Dr Gama(cardiology). She states she will call and get f/u appt with him after discharge. HFTC nurse notified pt if she is unable to get appt within 7-10 days after discharge she can call [facility] number provided to get appt with Judy LOPEZ until she is able to make it in with her cardiologist.     Pt also reminded HF nurse will call 48-72 hours after discharge to check on them.     PT and family verbalize read back of information given.  Encouraged pt and family to read over information often and contact team with any questions or concerns.     "

## 2024-11-29 NOTE — ED PROVIDER NOTES
SCRIBE(s) #1 NOTE: Yumiko ROSAS & Delaney Chin, am scribing for, and in the presence of, Kurt Estrada MD, have scribed the entire note.       History     Chief Complaint   Patient presents with    Shortness of Breath     Per EMS, pt was 68% on RA upon arrival. Used x2 albuterol PTA with no relief. Give 3 sprays nitro en route. Arrived on BiPAP. PMHx CHF COPD     Review of patient's allergies indicates:   Allergen Reactions    Penicillins          History of Present Illness     HPI    11/29/2024, 6:28 AM  History obtained from the ambulance service and patient. Patient arrives to the ED on BiPAP.      History of Present Illness: Abigail Babb is a 79 y.o. female patient with a PMHx of CHF and COPD who presents to the Emergency Department for evaluation of difficulty breathing which began around 4 AM this morning. Patient is not normally on oxygen at home. Her oxygen sats went from 68 before BiPAP to 100 post-BiPAP. Per ambulance service, the patient had diminished bilateral breath sounds. She takes lasix as needed. Symptoms are constant and moderate in severity. No mitigating or exacerbating factors reported. No associated sxs. Prior Tx includes albuterol and 2 nebulizer treatments administered by the patient at home. Ambulance service provided 3 sprays nitro. No further complaints or concerns at this time.       Arrival mode: Ambulance Service    PCP: No primary care provider on file.        Past Medical History:  Past Medical History:   Diagnosis Date    Breast cancer     CHF (congestive heart failure)     COPD (chronic obstructive pulmonary disease)     Hypertension        Past Surgical History:  History reviewed. No pertinent surgical history.      Family History:  No family history on file.    Social History:  Social History     Tobacco Use    Smoking status: Former     Types: Cigarettes    Smokeless tobacco: Never   Substance and Sexual Activity    Alcohol use: Not Currently    Drug use: Never    Sexual  activity: Not on file        Review of Systems     Review of Systems   Constitutional:  Negative for fever.   HENT:  Negative for sore throat.    Respiratory:  Positive for shortness of breath.    Cardiovascular:  Negative for chest pain.   Gastrointestinal:  Negative for nausea.   Genitourinary:  Negative for dysuria.   Musculoskeletal:  Negative for back pain.   Skin:  Negative for rash.   Neurological:  Negative for weakness.   Hematological:  Does not bruise/bleed easily.   All other systems reviewed and are negative.     Physical Exam     Initial Vitals   BP Pulse Resp Temp SpO2   11/29/24 0625 11/29/24 0620 11/29/24 0620 11/29/24 0626 11/29/24 0620   (!) 150/90 88 (!) 27 97.9 °F (36.6 °C) 96 %      MAP       --                 Physical Exam  Nursing Notes and Vital Signs Reviewed.  Constitutional: Patient is in mild distress.   Head: Atraumatic. Normocephalic.  Eyes: PERRL. EOM intact. Conjunctivae are not pale. No scleral icterus.  ENT: Mucous membranes are moist. Oropharynx is clear and symmetric.    Neck: Supple. Full ROM. No lymphadenopathy.  Cardiovascular: Regular rate. Regular rhythm. No murmurs, rubs, or gallops. Distal pulses are 2+ and symmetric.  Pulmonary/Chest: Mild respiratory distress with CPAP in place by EMS. Coarse breath sounds bilaterally.  Abdominal: Soft and non-distended. There is no tenderness. No rebound, guarding, or rigidity.   Genitourinary: No CVA tenderness  Musculoskeletal: Bilateral lower extremity trace pitting edema.   Skin: Warm and dry.  Neurological:  Alert, awake, and appropriate.  Normal speech.  No acute focal neurological deficits are appreciated.  Psychiatric: Slight anxiety. Good eye contact. Appropriate in content.     ED Course   Critical Care    Date/Time: 11/29/2024 7:31 AM    Performed by: Kurt Estrada MD  Authorized by: Kurt Estrada MD  Direct patient critical care time: 20 minutes  Additional history critical care time: 15 minutes  Ordering / reviewing  critical care time: 5 minutes  Documentation critical care time: 5 minutes  Consulting other physicians critical care time: 5 minutes  Consult with family critical care time: 5 minutes  Other critical care time: 5 minutes  Total critical care time (exclusive of procedural time) : 60 minutes  Critical care time was exclusive of separately billable procedures and treating other patients and teaching time.  Critical care was necessary to treat or prevent imminent or life-threatening deterioration of the following conditions: Hypoxia and elevated troponin.  Critical care was time spent personally by me on the following activities: blood draw for specimens, development of treatment plan with patient or surrogate, discussions with consultants, interpretation of cardiac output measurements, evaluation of patient's response to treatment, examination of patient, obtaining history from patient or surrogate, ordering and performing treatments and interventions, ordering and review of radiographic studies, ordering and review of laboratory studies, pulse oximetry, re-evaluation of patient's condition and review of old charts.        ED Vital Signs:  Vitals:    11/29/24 0620 11/29/24 0625 11/29/24 0626 11/29/24 0648   BP:  (!) 150/90 (!) 156/65 137/65   Pulse: 88 93 92 89   Resp: (!) 27 20 (!) 25 (!) 27   Temp:   97.9 °F (36.6 °C)    TempSrc:   Oral    SpO2: 96% 100% 100% 97%   Weight:   91.8 kg (202 lb 6.1 oz)     11/29/24 0700 11/29/24 0730   BP: (!) 128/59 (!) 122/58   Pulse: 74 69   Resp: (!) 27 (!) 26   Temp:     TempSrc:     SpO2: 96% 96%   Weight:         Abnormal Lab Results:  Labs Reviewed   CBC W/ AUTO DIFFERENTIAL - Abnormal       Result Value    WBC 5.91      RBC 2.81 (*)     Hemoglobin 9.6 (*)     Hematocrit 30.3 (*)      (*)     MCH 34.2 (*)     MCHC 31.7 (*)     RDW 16.1 (*)     Platelets 227      MPV 10.8      Immature Granulocytes 0.2      Gran # (ANC) 4.7      Immature Grans (Abs) 0.01      Lymph # 0.6  (*)     Mono # 0.5      Eos # 0.1      Baso # 0.06      nRBC 0      Gran % 79.6 (*)     Lymph % 9.6 (*)     Mono % 8.8      Eosinophil % 0.8      Basophil % 1.0      Differential Method Automated     COMPREHENSIVE METABOLIC PANEL - Abnormal    Sodium 141      Potassium 4.7      Chloride 110      CO2 20 (*)     Glucose 139 (*)     BUN 33 (*)     Creatinine 2.0 (*)     Calcium 9.7      Total Protein 6.9      Albumin 3.7      Total Bilirubin 0.8      Alkaline Phosphatase 95      AST 48 (*)     ALT 41      eGFR 25 (*)     Anion Gap 11     TROPONIN I - Abnormal    Troponin I 0.092 (*)    B-TYPE NATRIURETIC PEPTIDE - Abnormal    BNP 1,010 (*)    URINALYSIS, REFLEX TO URINE CULTURE - Abnormal    Specimen UA Urine, Clean Catch      Color, UA Yellow      Appearance, UA Clear      pH, UA 6.0      Specific Gravity, UA 1.020      Protein, UA 2+ (*)     Glucose, UA 4+ (*)     Ketones, UA Negative      Bilirubin (UA) Negative      Occult Blood UA Negative      Nitrite, UA Negative      Urobilinogen, UA Negative      Leukocytes, UA Negative      Narrative:     Specimen Source->Urine   ISTAT PROCEDURE - Abnormal    POC PH 7.324 (*)     POC PCO2 45.2 (*)     POC PO2 77 (*)     POC HCO3 23.5 (*)     POC BE -3 (*)     POC SATURATED O2 94      Rate 18      Sample ARTERIAL      Site RR      Allens Test Pass      DelSys CPAP/BiPAP      Mode BiPAP      FiO2 40      IP 12      EP 6     HEPATITIS C ANTIBODY    Hepatitis C Ab Negative      Narrative:     Release to patient->Immediate   HEP C VIRUS HOLD SPECIMEN    HEP C Virus Hold Specimen Hold for HCV sendout      Narrative:     Release to patient->Immediate   HIV 1 / 2 ANTIBODY    HIV 1/2 Ag/Ab Negative      Narrative:     Release to patient->Immediate   LACTIC ACID, PLASMA    Lactate (Lactic Acid) 0.9     URINALYSIS MICROSCOPIC    RBC, UA 1      WBC, UA 0      Bacteria None      Yeast, UA None      Hyaline Casts, UA 0      Microscopic Comment SEE COMMENT      Narrative:     Specimen  Source->Urine   APTT   PROTIME-INR        All Lab Results:  Results for orders placed or performed during the hospital encounter of 11/29/24   Hepatitis C Antibody    Collection Time: 11/29/24  6:35 AM   Result Value Ref Range    Hepatitis C Ab Negative Negative   HCV Virus Hold Specimen    Collection Time: 11/29/24  6:35 AM   Result Value Ref Range    HEP C Virus Hold Specimen Hold for HCV sendout    HIV 1/2 Ag/Ab (4th Gen)    Collection Time: 11/29/24  6:35 AM   Result Value Ref Range    HIV 1/2 Ag/Ab Negative Negative   CBC auto differential    Collection Time: 11/29/24  6:35 AM   Result Value Ref Range    WBC 5.91 3.90 - 12.70 K/uL    RBC 2.81 (L) 4.00 - 5.40 M/uL    Hemoglobin 9.6 (L) 12.0 - 16.0 g/dL    Hematocrit 30.3 (L) 37.0 - 48.5 %     (H) 82 - 98 fL    MCH 34.2 (H) 27.0 - 31.0 pg    MCHC 31.7 (L) 32.0 - 36.0 g/dL    RDW 16.1 (H) 11.5 - 14.5 %    Platelets 227 150 - 450 K/uL    MPV 10.8 9.2 - 12.9 fL    Immature Granulocytes 0.2 0.0 - 0.5 %    Gran # (ANC) 4.7 1.8 - 7.7 K/uL    Immature Grans (Abs) 0.01 0.00 - 0.04 K/uL    Lymph # 0.6 (L) 1.0 - 4.8 K/uL    Mono # 0.5 0.3 - 1.0 K/uL    Eos # 0.1 0.0 - 0.5 K/uL    Baso # 0.06 0.00 - 0.20 K/uL    nRBC 0 0 /100 WBC    Gran % 79.6 (H) 38.0 - 73.0 %    Lymph % 9.6 (L) 18.0 - 48.0 %    Mono % 8.8 4.0 - 15.0 %    Eosinophil % 0.8 0.0 - 8.0 %    Basophil % 1.0 0.0 - 1.9 %    Differential Method Automated    Comprehensive metabolic panel    Collection Time: 11/29/24  6:35 AM   Result Value Ref Range    Sodium 141 136 - 145 mmol/L    Potassium 4.7 3.5 - 5.1 mmol/L    Chloride 110 95 - 110 mmol/L    CO2 20 (L) 23 - 29 mmol/L    Glucose 139 (H) 70 - 110 mg/dL    BUN 33 (H) 8 - 23 mg/dL    Creatinine 2.0 (H) 0.5 - 1.4 mg/dL    Calcium 9.7 8.7 - 10.5 mg/dL    Total Protein 6.9 6.0 - 8.4 g/dL    Albumin 3.7 3.5 - 5.2 g/dL    Total Bilirubin 0.8 0.1 - 1.0 mg/dL    Alkaline Phosphatase 95 40 - 150 U/L    AST 48 (H) 10 - 40 U/L    ALT 41 10 - 44 U/L    eGFR 25 (A)  >60 mL/min/1.73 m^2    Anion Gap 11 8 - 16 mmol/L   Troponin I    Collection Time: 11/29/24  6:35 AM   Result Value Ref Range    Troponin I 0.092 (H) 0.000 - 0.026 ng/mL   Brain natriuretic peptide    Collection Time: 11/29/24  6:35 AM   Result Value Ref Range    BNP 1,010 (H) 0 - 99 pg/mL   Lactic Acid, Plasma    Collection Time: 11/29/24  6:35 AM   Result Value Ref Range    Lactate (Lactic Acid) 0.9 0.5 - 2.2 mmol/L   Urinalysis, Reflex to Urine Culture Urine, Clean Catch    Collection Time: 11/29/24  6:46 AM    Specimen: Urine   Result Value Ref Range    Specimen UA Urine, Clean Catch     Color, UA Yellow Yellow, Straw, Elvira    Appearance, UA Clear Clear    pH, UA 6.0 5.0 - 8.0    Specific Gravity, UA 1.020 1.005 - 1.030    Protein, UA 2+ (A) Negative    Glucose, UA 4+ (A) Negative    Ketones, UA Negative Negative    Bilirubin (UA) Negative Negative    Occult Blood UA Negative Negative    Nitrite, UA Negative Negative    Urobilinogen, UA Negative <2.0 EU/dL    Leukocytes, UA Negative Negative   Urinalysis Microscopic    Collection Time: 11/29/24  6:46 AM   Result Value Ref Range    RBC, UA 1 0 - 4 /hpf    WBC, UA 0 0 - 5 /hpf    Bacteria None None-Occ /hpf    Yeast, UA None None    Hyaline Casts, UA 0 0-1/lpf /lpf    Microscopic Comment SEE COMMENT    ISTAT PROCEDURE    Collection Time: 11/29/24  7:19 AM   Result Value Ref Range    POC PH 7.324 (L) 7.35 - 7.45    POC PCO2 45.2 (H) 35 - 45 mmHg    POC PO2 77 (L) 80 - 100 mmHg    POC HCO3 23.5 (L) 24 - 28 mmol/L    POC BE -3 (L) -2 to 2 mmol/L    POC SATURATED O2 94 95 - 100 %    Rate 18     Sample ARTERIAL     Site RR     Allens Test Pass     DelSys CPAP/BiPAP     Mode BiPAP     FiO2 40     IP 12     EP 6        Imaging Results:  Imaging Results              X-Ray Chest AP Portable (Final result)  Result time 11/29/24 07:35:57      Final result by Sunil Harvey MD (11/29/24 07:35:57)                   Impression:      As above.      Electronically signed  by: Sunil Harvey  Date:    11/29/2024  Time:    07:35               Narrative:    EXAMINATION:  XR CHEST AP PORTABLE    CLINICAL HISTORY:  Asthma;.    TECHNIQUE:  Single frontal portable view of the chest was performed.    COMPARISON:  None    FINDINGS:  Moderate to severe cardiomegaly.    Pulmonary vascular congestion with interstitial edema suspicious for heart failure.  Slightly more confluent opacification right perihilar region raising question of superimposed infection/pneumonia.  No pleural effusion or pneumothorax.                                       The EKG was ordered, reviewed, and independently interpreted by the ED provider.  Interpretation time: 06:27  Rate: 90 BPM  Rhythm: normal sinus rhythm  Interpretation: Left bundle branch block. ST compression  T-wave converison laterally. No STEMI.           The Emergency Provider reviewed the vital signs and test results, which are outlined above.     ED Discussion       7:42 AM: Discussed case with Dr. Marques ([facility]). Dr. Marques agrees with current care and management of pt and accepts admission.   Admitting Service: [facility]  Admitting Physician: Dr. Marques  Admit to: Med/Tele    7:42 AM: Re-evaluated pt. I have discussed test results, shared treatment plan, and the need for admission with patient/family at bedside. Pt/family express understanding at this time and agree with all information. All questions answered. Pt/family member(s) have no further questions or concerns at this time. Pt is ready for admit.    ED Course as of 11/29/24 0746   Fri Nov 29, 2024   0655 WBC: 5.91 [SRIKANTH]   0656 Hemoglobin(!): 9.6 [SRIKANTH]   0656 Hematocrit(!): 30.3 [SRIKANTH]   0656 X-Ray Chest AP Portable  Chest x-ray looks consistent with pulmonary edema [SRIKANTH]   0722 BNP(!): 1,010 [SRIKANTH]   0722 Troponin I(!): 0.092 [SRIKANTH]   0722 BUN(!): 33 [SRIKANTH]   0722 Creatinine(!): 2.0 [SRIKANTH]   0743 POC PCO2(!): 45.2 [SRIKANTH]   0743 POC PO2(!): 77 [SRIKANTH]      ED Course User Index  [SRIKANTH]  Kurt Estrada MD     Medical Decision Making  This patient presents with severe respiratory distress satting in the mid 60s with a history of COPD/CHF.  EKG here/today is sinus rhythm at 90 with a (possibly new) left bundle-branch block.  I do not see any old EKGs in [facility].     Patient in severe respiratory distress on BiPAP on arrival however looks much better after treatment here in the emergency department. ABG showed a pCO2 of 45 and a PaO2 of 77 so I have placed her on 3 L nasal cannula and she was maintaining oxygen saturation around 95%. BUN/creatinine 33/2, troponin elevated at 0.092, BNP elevated at 1010, and a chest x-ray consistent with pulmonary edema/CHF.  Patient has received aspirin, heparin, and echo is pending    Problems Addressed:  Acute hypoxemic respiratory failure: acute illness or injury that poses a threat to life or bodily functions  CHF (congestive heart failure): chronic illness or injury with exacerbation, progression, or side effects of treatment  NSTEMI (non-ST elevated myocardial infarction): acute illness or injury that poses a threat to life or bodily functions  Shortness of breath: acute illness or injury that poses a threat to life or bodily functions    Amount and/or Complexity of Data Reviewed  Independent Historian: EMS     Details: States they found the patient in severe respiratory distress with oxygen saturations around 68% on her home oxygen at 2-3 L per nasal cannula.  She was cyanotic and most of her symptoms improved significantly after being placed on CPAP  External Data Reviewed: ECG.     Details: Sinus rhythm with 1st degree AV block Septal infarct , age undetermined T wave abnormality, consider lateral ischemia No previous ECG available for comparison Electronically Signed On 7- 16:02:22 CDT by [employer]   Labs: ordered. Decision-making details documented in ED Course.  Radiology: ordered and independent interpretation performed. Decision-making details  documented in ED Course.     Details: Pulmonary edema noted  ECG/medicine tests: ordered and independent interpretation performed. Decision-making details documented in ED Course.     Details: No STEMI  Discussion of management or test interpretation with external provider(s): Dr. Rao (Interventional Cardiology) states at 7:05 AM the patient is in pulmonary edema by cxr and has an effusion. He recommends diurese support, oxygenation, and address accordingly. No acute cath lab. He also recommends getting an echo.       Risk  OTC drugs.  Prescription drug management.  Decision regarding hospitalization.  Risk Details: Differential diagnosis includes ACS, CHF, COPD, acute bronchitis, COVID, pulmonary embolism, etc.                ED Medication(s):  Medications   nitroGLYCERIN 2% TD oint ointment 1 inch (1 inch Topical (Top) Given 11/29/24 0636)   heparin 25,000 units in dextrose 5% (100 units/ml) IV bolus from bag LOW INTENSITY nomogram - [facility] (has no administration in time range)   heparin 25,000 units in dextrose 5% 250 mL (100 units/mL) infusion LOW INTENSITY nomogram - [facility] (has no administration in time range)   heparin 25,000 units in dextrose 5% (100 units/ml) IV bolus from bag LOW INTENSITY nomogram - [facility] (has no administration in time range)   heparin 25,000 units in dextrose 5% (100 units/ml) IV bolus from bag LOW INTENSITY nomogram - [facility] (has no administration in time range)   albuterol-ipratropium 2.5 mg-0.5 mg/3 mL nebulizer solution 3 mL (3 mLs Nebulization Given 11/29/24 0630)   methylPREDNISolone sodium succinate injection 125 mg (125 mg Intravenous Given 11/29/24 0631)   furosemide injection 60 mg (60 mg Intravenous Given 11/29/24 0636)   aspirin tablet 325 mg (325 mg Oral Given 11/29/24 0650)       New Prescriptions    No medications on file               Scribe Attestation:   Scribe(s) #1: I performed the above scribed service and the documentation accurately describes the services I performed. I  attest to the accuracy of the note.     Attending:   Physician Attestation Statement for Scribe(s) #1: Sean ROSAS Jon M., MD, personally performed the services described in this documentation, as scribed by Yumiko Mistry & Delaney Chin in my presence, and it is both accurate and complete.           Clinical Impression       ICD-10-CM ICD-9-CM   1. Acute hypoxemic respiratory failure  J96.01 518.81   2. Shortness of breath  R06.02 786.05   3. CHF (congestive heart failure)  I50.9 428.0   4. NSTEMI (non-ST elevated myocardial infarction)  I21.4 410.70       Disposition:   Disposition: Admitted  Condition: Kurt Posada MD  11/29/24 0748

## 2024-11-29 NOTE — ASSESSMENT & PLAN NOTE
-Initial troponin 0.092, repeat pending  -Elevation likely secondary to demand ischemia from hypoxia, decompensated CHF, COPD  -Continue heparin gtt, ASA, BB, statin, Imdur  -Reports [facility] 10/23 with PCI of   -TTE pending

## 2024-11-29 NOTE — ASSESSMENT & PLAN NOTE
-Secondary to decompensated CHF + underlying COPD  -Continue IV diuresis  -Continue nebs/pulmonary toilet  -Strict I's/O's  -TTE pending

## 2024-11-29 NOTE — HPI
Ms. Babb is a 79 year old female patient whose current medical conditions include CHF, COPD, HTN, breast CA, AS, and CAD s/p prior PCI in 10/23 who presented to [facility] ED due to acute onset of SOB at 4 AM. She denied any associated fever, chills, lita CP, palpitations, nausea, or vomiting. Called EMS and was noted be hypoxic with OS sat of  68% upon arrival. She was placed on bipap and given breathing treatments/nebs along with sprays of nitro en route. Initial workup revealed creatinine of 2.0, BNP of 101, and troponin of 0.092. CXR showed findings consistent with pulmonary edema. LBBB was noted on EKG and patient was subsequently placed on Tridil and heparin drips and admitted to ICU for further evaluation and treatment. Cardiology consulted to assist with management. Patient seen and examined today, in ED. Feeling better, admits to fatigue. SOB improved. CP free. She reports compliance with her medications. Admits she ate ham/Thanksgiving food yesterday. Followed routinely by OP cardiologist. Dr. Gama. Prior TTE in 7/23 with normal EF, repeat pending.

## 2024-11-29 NOTE — H&P (VIEW-ONLY)
[facility] ([facility])  Cardiology  Consult Note    Patient Name: Abigail Babb  MRN: [id number]  Admission Date: 11/29/2024  Hospital Length of Stay: 0 days  Code Status: Full Code   Attending Provider: Gavi Marques MD   Consulting Provider: Gabby Greene PA-C  Primary Care Physician: No primary care provider on file.  Principal Problem:<principal problem not specified>    Patient information was obtained from patient, past medical records, and ER records.     Inpatient consult to Cardiology  Consult performed by: Gabby Greene PA-C  Consult ordered by: Gavi Marques MD        Subjective:     Chief Complaint:  SOB/CHF     HPI:   Ms. Babb is a 79 year old female patient whose current medical conditions include CHF, COPD, HTN, breast CA, AS, and CAD s/p prior PCI in 10/23 who presented to [facility] ED due to acute onset of SOB at 4 AM. She denied any associated fever, chills, lita CP, palpitations, nausea, or vomiting. Called EMS and was noted be hypoxic with OS sat of  68% upon arrival. She was placed on bipap and given breathing treatments/nebs along with sprays of nitro en route. Initial workup revealed creatinine of 2.0, BNP of 101, and troponin of 0.092. CXR showed findings consistent with pulmonary edema. LBBB was noted on EKG and patient was subsequently placed on Tridil and heparin drips and admitted to ICU for further evaluation and treatment. Cardiology consulted to assist with management. Patient seen and examined today, in ED. Feeling better, admits to fatigue. SOB improved. CP free. She reports compliance with her medications. Admits she ate ham/Thanksgiving food yesterday. Followed routinely by OP cardiologist. Dr. Gama. Prior TTE in 7/23 with normal EF, repeat pending.                 Past Medical History:   Diagnosis Date    Breast cancer     CHF (congestive heart failure)     COPD (chronic obstructive pulmonary disease)     Hypertension        History  reviewed. No pertinent surgical history.    Review of patient's allergies indicates:   Allergen Reactions    Penicillins        No current facility-administered medications on file prior to encounter.     Current Outpatient Medications on File Prior to Encounter   Medication Sig    azelastine (ASTELIN) 137 mcg (0.1 %) nasal spray 2 sprays.    carvediloL (COREG) 25 MG tablet TAKE ONE TABLET BY MOUTH TWICE DAILY THANK YOU    dextroamphetamine-amphetamine 10 mg Tab TAKE ONE TABLET BY MOUTH EVERY MORNING THANK YOU    furosemide (LASIX) 40 MG tablet Take 40 mg by mouth.    gabapentin (NEURONTIN) 300 MG capsule TAKE ONE CAPSULE BY MOUTH EVERY NIGHT AT BEDTIME THANK YOU    IBRANCE 100 mg Tab Take 100 mg by mouth.    pantoprazole (PROTONIX) 40 MG tablet TAKE ONE TABLET BY MOUTH DAILY THANK YOU    promethazine-dextromethorphan (PROMETHAZINE-DM) 6.25-15 mg/5 mL Syrp Take by mouth every 8 (eight) hours as needed.    spironolactone (ALDACTONE) 25 MG tablet TAKE ONE TABLET BY MOUTH DAILY THANK YOU    TRELEGY ELLIPTA 100-62.5-25 mcg DsDv Inhale 1 puff into the lungs.    albuterol-ipratropium (DUO-NEB) 2.5 mg-0.5 mg/3 mL nebulizer solution 3 ML  .    ALPRAZolam (XANAX) 0.5 MG tablet Take 0.5 mg by mouth daily as needed.    aspirin (ECOTRIN) 81 MG EC tablet Take 1 tablet by mouth every morning.    calcium-vitamin D 250 mg-2.5 mcg (100 unit) per tablet Take 1 tablet by mouth once daily.    cetirizine (ZYRTEC) 10 MG tablet Take 10 mg by mouth.    clopidogreL (PLAVIX) 75 mg tablet Take 75 mg by mouth.    coQ10, ubiquinol, 100 mg Cap Take 1 capsule by mouth.    epoetin soha (PROCRIT) 10,000 unit/mL injection Inject 10,000 Units into the skin.    FARXIGA 10 mg tablet Take 10 mg by mouth.    HYDROcodone-acetaminophen (NORCO)  mg per tablet Take by mouth.    isosorbide mononitrate (IMDUR) 30 MG 24 hr tablet Take 30 mg by mouth.    metoprolol tartrate (LOPRESSOR) 25 MG tablet Take 25 mg by mouth.    rosuvastatin (CRESTOR) 20 MG  tablet Take 20 mg by mouth.    VERQUVO 5 mg Tab Take 1 tablet by mouth.     Family History    None       Tobacco Use    Smoking status: Former     Types: Cigarettes    Smokeless tobacco: Never   Substance and Sexual Activity    Alcohol use: Not Currently    Drug use: Never    Sexual activity: Not on file     Review of Systems   Constitutional: Positive for malaise/fatigue.   HENT: Negative.     Eyes: Negative.    Cardiovascular:  Positive for dyspnea on exertion and orthopnea.   Respiratory:  Positive for shortness of breath.    Endocrine: Negative.    Hematologic/Lymphatic: Negative.    Skin: Negative.    Musculoskeletal: Negative.    Gastrointestinal: Negative.    Genitourinary: Negative.    Neurological: Negative.    Psychiatric/Behavioral: Negative.     Allergic/Immunologic: Negative.      Objective:     Vital Signs (Most Recent):  Temp: 97.9 °F (36.6 °C) (11/29/24 0626)  Pulse: 68 (11/29/24 0800)  Resp: (!) 24 (11/29/24 0800)  BP: (!) 145/67 (11/29/24 0800)  SpO2: 96 % (11/29/24 0800) Vital Signs (24h Range):  Temp:  [97.9 °F (36.6 °C)] 97.9 °F (36.6 °C)  Pulse:  [68-93] 68  Resp:  [20-27] 24  SpO2:  [96 %-100 %] 96 %  BP: (122-156)/(58-90) 145/67     Weight: 91.8 kg (202 lb 6.1 oz)  Body mass index is 35.86 kg/m².    SpO2: 96 %         Intake/Output Summary (Last 24 hours) at 11/29/2024 0828  Last data filed at 11/29/2024 0748  Gross per 24 hour   Intake --   Output 450 ml   Net -450 ml       Lines/Drains/Airways       Drain  Duration                  Urethral Catheter 11/29/24 0643 Silicone 16 Fr. <1 day              Peripheral Intravenous Line  Duration                  Peripheral IV - Single Lumen 11/29/24 0630 20 G Posterior;Right Hand <1 day         Peripheral IV - Single Lumen 11/29/24 0742 20 G Right Antecubital <1 day                     Physical Exam  Vitals and nursing note reviewed.   Constitutional:       Appearance: She is ill-appearing.      Comments: ON supplemental O2   HENT:      Head:  Normocephalic and atraumatic.   Eyes:      Pupils: Pupils are equal, round, and reactive to light.   Neck:      Vascular: JVD present.   Cardiovascular:      Rate and Rhythm: Normal rate and regular rhythm.      Heart sounds: S1 normal and S2 normal. Murmur heard.      Harsh midsystolic murmur is present at the upper right sternal border radiating to the neck.   Pulmonary:      Breath sounds: Rhonchi and rales present.   Abdominal:      Palpations: Abdomen is soft.   Musculoskeletal:      Right lower leg: Edema present.      Left lower leg: Edema present.   Skin:     General: Skin is warm and dry.   Neurological:      General: No focal deficit present.      Mental Status: She is oriented to person, place, and time.   Psychiatric:         Mood and Affect: Mood normal.         Behavior: Behavior normal.         Thought Content: Thought content normal.          Significant Labs: CMP   Recent Labs   Lab 11/29/24  0635      K 4.7      CO2 20*   *   BUN 33*   CREATININE 2.0*   CALCIUM 9.7   PROT 6.9   ALBUMIN 3.7   BILITOT 0.8   ALKPHOS 95   AST 48*   ALT 41   ANIONGAP 11   , CBC   Recent Labs   Lab 11/29/24  0635   WBC 5.91   HGB 9.6*   HCT 30.3*      , Troponin   Recent Labs   Lab 11/29/24  0635   TROPONINI 0.092*   , and All pertinent lab results from the last 24 hours have been reviewed.    Significant Imaging: Echocardiogram: Transthoracic echo (TTE) complete (Cupid Only): No results found for this or any previous visit. and EKG: Reviewed  Assessment and Plan:   Patient who presents with SOB in setting of CHF/COPD. Improving. Continue IV diuresis and OMT. LBBB noted on EKG, TTE pending. Records requested regarding prior LHC.    Breast cancer  -Per primary team    Primary hypertension  -Continue home meds    LBBB (left bundle branch block)  -Noted on EKG, no prior to compare  -TTE pending  -Continue OMT  -Records requested from Dr. Gama to review recent cath    CAD (coronary artery  disease)  -See plan under elevated trop    Elevated troponin  -Initial troponin 0.092, repeat pending  -Elevation likely secondary to demand ischemia from hypoxia, decompensated CHF, COPD  -Continue heparin gtt, ASA, BB, statin, Imdur  -Reports [facility] 10/23 with PCI of   -TTE pending    Acute on chronic congestive heart failure  -See plan under SOB  -TTE from 7/23 with normal EF, repeat pending  -Admits to eating Thanksgiving food  -On Lasix prn as OP, likely needs daily dose  -Continue OMT-BB, Farxiga  -Aldactone if creatinine permits, not on ACEi/ARB or Entresto as OP    SOB (shortness of breath)  -Secondary to decompensated CHF + underlying COPD  -Continue IV diuresis  -Continue nebs/pulmonary toilet  -Strict I's/O's  -TTE pending        VTE Risk Mitigation (From admission, onward)           Ordered     IP VTE HIGH RISK PATIENT  Once         11/29/24 0846     heparin 25,000 units in dextrose 5% (100 units/ml) IV bolus from bag LOW INTENSITY nomogram - [facility]  As needed (PRN)        Question:  Heparin Infusion Adjustment (DO NOT MODIFY ANSWER)  Answer:  [email or web address]\epic\Images\Pharmacy\HeparinInfusions\heparin LOW INTENSITY nomogram for [facility] [id number].pdf    11/29/24 0725     heparin 25,000 units in dextrose 5% (100 units/ml) IV bolus from bag LOW INTENSITY nomogram - [facility]  As needed (PRN)        Question:  Heparin Infusion Adjustment (DO NOT MODIFY ANSWER)  Answer:  [email or web address]\epic\Images\Pharmacy\HeparinInfusions\heparin LOW INTENSITY nomogram for [facility] [id number].pdf    11/29/24 0725     heparin 25,000 units in dextrose 5% 250 mL (100 units/mL) infusion LOW INTENSITY nomogram - [facility]  Continuous        Question:  Begin at (units/kg/hr)  Answer:  12    11/29/24 0794                    Thank you for your consult. I will follow-up with patient. Please contact us if you have any additional questions.    Gabby Greene PA-C  Cardiology   [facility] Tele[facility] ([facility])

## 2024-11-29 NOTE — ASSESSMENT & PLAN NOTE
Patients blood pressure range in the last 24 hours was: BP  Min: 122/58  Max: 187/76.The patient's inpatient anti-hypertensive regimen is listed below:  Current Antihypertensives  nitroGLYCERIN 2% TD oint ointment 1 inch, Every 8 hours, Topical (Top)  , ,   , , Oral  , , Oral  furosemide injection 40 mg, Every 12 hours, Intravenous  spironolactone tablet 25 mg, Daily, Oral    Plan  - BP is controlled, no changes needed to their regimen

## 2024-11-29 NOTE — CONSULTS
[facility] - Telemetry ([facility])  Cardiology  Consult Note    Patient Name: Abigail Babb  MRN: [id number]  Admission Date: 11/29/2024  Hospital Length of Stay: 0 days  Code Status: Full Code   Attending Provider: Gavi Marques MD   Consulting Provider: Gabby Greene PA-C  Primary Care Physician: No primary care provider on file.  Principal Problem:<principal problem not specified>    Patient information was obtained from patient, past medical records, and ER records.     Inpatient consult to Cardiology  Consult performed by: Gabby Greene PA-C  Consult ordered by: Gavi Marques MD        Subjective:     Chief Complaint:  SOB/CHF     HPI:   Ms. Babb is a 79 year old female patient whose current medical conditions include CHF, COPD, HTN, breast CA, AS, and CAD s/p prior PCI in 10/23 who presented to [facility] ED due to acute onset of SOB at 4 AM. She denied any associated fever, chills, lita CP, palpitations, nausea, or vomiting. Called EMS and was noted be hypoxic with OS sat of  68% upon arrival. She was placed on bipap and given breathing treatments/nebs along with sprays of nitro en route. Initial workup revealed creatinine of 2.0, BNP of 101, and troponin of 0.092. CXR showed findings consistent with pulmonary edema. LBBB was noted on EKG and patient was subsequently placed on Tridil and heparin drips and admitted to ICU for further evaluation and treatment. Cardiology consulted to assist with management. Patient seen and examined today, in ED. Feeling better, admits to fatigue. SOB improved. CP free. She reports compliance with her medications. Admits she ate ham/Thanksgiving food yesterday. Followed routinely by OP cardiologist. Dr. Gama. Prior TTE in 7/23 with normal EF, repeat pending.                 Past Medical History:   Diagnosis Date    Breast cancer     CHF (congestive heart failure)     COPD (chronic obstructive pulmonary disease)     Hypertension        History  reviewed. No pertinent surgical history.    Review of patient's allergies indicates:   Allergen Reactions    Penicillins        No current facility-administered medications on file prior to encounter.     Current Outpatient Medications on File Prior to Encounter   Medication Sig    azelastine (ASTELIN) 137 mcg (0.1 %) nasal spray 2 sprays.    carvediloL (COREG) 25 MG tablet TAKE ONE TABLET BY MOUTH TWICE DAILY THANK YOU    dextroamphetamine-amphetamine 10 mg Tab TAKE ONE TABLET BY MOUTH EVERY MORNING THANK YOU    furosemide (LASIX) 40 MG tablet Take 40 mg by mouth.    gabapentin (NEURONTIN) 300 MG capsule TAKE ONE CAPSULE BY MOUTH EVERY NIGHT AT BEDTIME THANK YOU    IBRANCE 100 mg Tab Take 100 mg by mouth.    pantoprazole (PROTONIX) 40 MG tablet TAKE ONE TABLET BY MOUTH DAILY THANK YOU    promethazine-dextromethorphan (PROMETHAZINE-DM) 6.25-15 mg/5 mL Syrp Take by mouth every 8 (eight) hours as needed.    spironolactone (ALDACTONE) 25 MG tablet TAKE ONE TABLET BY MOUTH DAILY THANK YOU    TRELEGY ELLIPTA 100-62.5-25 mcg DsDv Inhale 1 puff into the lungs.    albuterol-ipratropium (DUO-NEB) 2.5 mg-0.5 mg/3 mL nebulizer solution 3 ML  .    ALPRAZolam (XANAX) 0.5 MG tablet Take 0.5 mg by mouth daily as needed.    aspirin (ECOTRIN) 81 MG EC tablet Take 1 tablet by mouth every morning.    calcium-vitamin D 250 mg-2.5 mcg (100 unit) per tablet Take 1 tablet by mouth once daily.    cetirizine (ZYRTEC) 10 MG tablet Take 10 mg by mouth.    clopidogreL (PLAVIX) 75 mg tablet Take 75 mg by mouth.    coQ10, ubiquinol, 100 mg Cap Take 1 capsule by mouth.    epoetin soha (PROCRIT) 10,000 unit/mL injection Inject 10,000 Units into the skin.    FARXIGA 10 mg tablet Take 10 mg by mouth.    HYDROcodone-acetaminophen (NORCO)  mg per tablet Take by mouth.    isosorbide mononitrate (IMDUR) 30 MG 24 hr tablet Take 30 mg by mouth.    metoprolol tartrate (LOPRESSOR) 25 MG tablet Take 25 mg by mouth.    rosuvastatin (CRESTOR) 20 MG  tablet Take 20 mg by mouth.    VERQUVO 5 mg Tab Take 1 tablet by mouth.     Family History    None       Tobacco Use    Smoking status: Former     Types: Cigarettes    Smokeless tobacco: Never   Substance and Sexual Activity    Alcohol use: Not Currently    Drug use: Never    Sexual activity: Not on file     Review of Systems   Constitutional: Positive for malaise/fatigue.   HENT: Negative.     Eyes: Negative.    Cardiovascular:  Positive for dyspnea on exertion and orthopnea.   Respiratory:  Positive for shortness of breath.    Endocrine: Negative.    Hematologic/Lymphatic: Negative.    Skin: Negative.    Musculoskeletal: Negative.    Gastrointestinal: Negative.    Genitourinary: Negative.    Neurological: Negative.    Psychiatric/Behavioral: Negative.     Allergic/Immunologic: Negative.      Objective:     Vital Signs (Most Recent):  Temp: 97.9 °F (36.6 °C) (11/29/24 0626)  Pulse: 68 (11/29/24 0800)  Resp: (!) 24 (11/29/24 0800)  BP: (!) 145/67 (11/29/24 0800)  SpO2: 96 % (11/29/24 0800) Vital Signs (24h Range):  Temp:  [97.9 °F (36.6 °C)] 97.9 °F (36.6 °C)  Pulse:  [68-93] 68  Resp:  [20-27] 24  SpO2:  [96 %-100 %] 96 %  BP: (122-156)/(58-90) 145/67     Weight: 91.8 kg (202 lb 6.1 oz)  Body mass index is 35.86 kg/m².    SpO2: 96 %         Intake/Output Summary (Last 24 hours) at 11/29/2024 0828  Last data filed at 11/29/2024 0748  Gross per 24 hour   Intake --   Output 450 ml   Net -450 ml       Lines/Drains/Airways       Drain  Duration                  Urethral Catheter 11/29/24 0643 Silicone 16 Fr. <1 day              Peripheral Intravenous Line  Duration                  Peripheral IV - Single Lumen 11/29/24 0630 20 G Posterior;Right Hand <1 day         Peripheral IV - Single Lumen 11/29/24 0742 20 G Right Antecubital <1 day                     Physical Exam  Vitals and nursing note reviewed.   Constitutional:       Appearance: She is ill-appearing.      Comments: ON supplemental O2   HENT:      Head:  Normocephalic and atraumatic.   Eyes:      Pupils: Pupils are equal, round, and reactive to light.   Neck:      Vascular: JVD present.   Cardiovascular:      Rate and Rhythm: Normal rate and regular rhythm.      Heart sounds: S1 normal and S2 normal. Murmur heard.      Harsh midsystolic murmur is present at the upper right sternal border radiating to the neck.   Pulmonary:      Breath sounds: Rhonchi and rales present.   Abdominal:      Palpations: Abdomen is soft.   Musculoskeletal:      Right lower leg: Edema present.      Left lower leg: Edema present.   Skin:     General: Skin is warm and dry.   Neurological:      General: No focal deficit present.      Mental Status: She is oriented to person, place, and time.   Psychiatric:         Mood and Affect: Mood normal.         Behavior: Behavior normal.         Thought Content: Thought content normal.          Significant Labs: CMP   Recent Labs   Lab 11/29/24  0635      K 4.7      CO2 20*   *   BUN 33*   CREATININE 2.0*   CALCIUM 9.7   PROT 6.9   ALBUMIN 3.7   BILITOT 0.8   ALKPHOS 95   AST 48*   ALT 41   ANIONGAP 11   , CBC   Recent Labs   Lab 11/29/24  0635   WBC 5.91   HGB 9.6*   HCT 30.3*      , Troponin   Recent Labs   Lab 11/29/24  0635   TROPONINI 0.092*   , and All pertinent lab results from the last 24 hours have been reviewed.    Significant Imaging: Echocardiogram: Transthoracic echo (TTE) complete (Cupid Only): No results found for this or any previous visit. and EKG: Reviewed  Assessment and Plan:   Patient who presents with SOB in setting of CHF/COPD. Improving. Continue IV diuresis and OMT. LBBB noted on EKG, TTE pending. Records requested regarding prior LHC.    Breast cancer  -Per primary team    Primary hypertension  -Continue home meds    LBBB (left bundle branch block)  -Noted on EKG, no prior to compare  -TTE pending  -Continue OMT  -Records requested from Dr. Gama to review recent cath    CAD (coronary artery  disease)  -See plan under elevated trop    Elevated troponin  -Initial troponin 0.092, repeat pending  -Elevation likely secondary to demand ischemia from hypoxia, decompensated CHF, COPD  -Continue heparin gtt, ASA, BB, statin, Imdur  -Reports [facility] 10/23 with PCI of   -TTE pending    Acute on chronic congestive heart failure  -See plan under SOB  -TTE from 7/23 with normal EF, repeat pending  -Admits to eating Thanksgiving food  -On Lasix prn as OP, likely needs daily dose  -Continue OMT-BB, Farxiga  -Aldactone if creatinine permits, not on ACEi/ARB or Entresto as OP    SOB (shortness of breath)  -Secondary to decompensated CHF + underlying COPD  -Continue IV diuresis  -Continue nebs/pulmonary toilet  -Strict I's/O's  -TTE pending        VTE Risk Mitigation (From admission, onward)           Ordered     IP VTE HIGH RISK PATIENT  Once         11/29/24 0846     heparin 25,000 units in dextrose 5% (100 units/ml) IV bolus from bag LOW INTENSITY nomogram - [facility]  As needed (PRN)        Question:  Heparin Infusion Adjustment (DO NOT MODIFY ANSWER)  Answer:  [email or web address]\epic\Images\Pharmacy\HeparinInfusions\heparin LOW INTENSITY nomogram for [facility] [id number].pdf    11/29/24 0725     heparin 25,000 units in dextrose 5% (100 units/ml) IV bolus from bag LOW INTENSITY nomogram - [facility]  As needed (PRN)        Question:  Heparin Infusion Adjustment (DO NOT MODIFY ANSWER)  Answer:  \[email or web address]\epic\Images\Pharmacy\HeparinInfusions\heparin LOW INTENSITY nomogram for [facility] [id number].pdf    11/29/24 0725     heparin 25,000 units in dextrose 5% 250 mL (100 units/mL) infusion LOW INTENSITY nomogram - [facility]  Continuous        Question:  Begin at (units/kg/hr)  Answer:  12    11/29/24 0709                    Thank you for your consult. I will follow-up with patient. Please contact us if you have any additional questions.    Gabby Greene PA-C  Cardiology   [facility] ([facility])

## 2024-11-29 NOTE — Clinical Note
The PA catheter was repositioned to the main pulmonary artery. Hemodynamics were performed. Cardiac output was obtained. 3.83

## 2024-11-29 NOTE — PLAN OF CARE
[facility] - Telemetry (Hospital)  Initial Discharge Assessment       Primary Care Provider: Faraz Nguyễn MD    Admission Diagnosis: Shortness of breath [R06.02]  CHF (congestive heart failure) [I50.9]  NSTEMI (non-ST elevated myocardial infarction) [I21.4]  Acute hypoxemic respiratory failure [J96.01]    Admission Date: 11/29/2024  Expected Discharge Date: Per Attending    Transition of Care Barriers: None    Payor: [facility] MCARE / Plan: [employer] MEDICARE ADVANTAGE / Product Type: Medicare Advantage /     Extended Emergency Contact Information  Primary Emergency Contact: Que Babb  Mobile Phone: [phone]  Relation: Spouse  Preferred language: English   needed? No    Discharge Plan A: Home with family         [facility] [address] [facility] [address]  [facility] [address]  Phone: [phone] Fax: [phone]      Initial Assessment (most recent)       Adult Discharge Assessment - 11/29/24 1054          Discharge Assessment    Assessment Type Discharge Planning Assessment     Confirmed/corrected address, phone number and insurance Yes     Confirmed Demographics Correct on Facesheet     Source of Information patient     When was your last doctors appointment? 11/19/24   Jesu Chau MD - Pulmonology    Communicated JANEEN with patient/caregiver Date not available/Unable to determine     Reason For Admission Acute on chronic congestive heart failure     People in Home spouse     Facility Arrived From: home     Do you expect to return to your current living situation? Yes     Do you have help at home or someone to help you manage your care at home? Yes     Who are your caregiver(s) and their phone number(s)? Que Babb - spouse     Prior to hospitilization cognitive status: Alert/Oriented     Current cognitive status: Alert/Oriented     Walking or Climbing Stairs Difficulty no     Dressing/Bathing Difficulty  no     Home Accessibility wheelchair accessible     Equipment Currently Used at Home none     Readmission within 30 days? No     Patient currently being followed by outpatient case management? No     Do you currently have service(s) that help you manage your care at home? No     Do you take prescription medications? Yes     Do you have prescription coverage? Yes     Coverage [employer] Managed Medicare     Do you have any problems affording any of your prescribed medications? No     Is the patient taking medications as prescribed? yes     Who is going to help you get home at discharge? Que [employer] - spouse     How do you get to doctors appointments? car, drives self     Are you on dialysis? No     Do you take coumadin? No     Discharge Plan A Home with family     DME Needed Upon Discharge  none     Discharge Plan discussed with: Patient     Transition of Care Barriers None        Transportation Needs    Has the lack of transportation kept you from medical appointments, meetings, work or from getting things needed for daily living? No                   Anticipated DC dispo: home with family  Prior Level of Function: independent with ADLs  People in home: Que Lopezeaux - spouse     Comments:  SW met with patient at bedside to introduce role and discuss discharge planning. Patient's spouse will be help at home and can provide transport at time of discharge. Confirmed demographics, insurance, and emergency contacts. SW updated Patient's whiteboard with contact information and anticipated DC plan. CM discharge needs depends on hospital progress. SW will continue following to assist with other needs.

## 2024-11-29 NOTE — ASSESSMENT & PLAN NOTE
Left mastectomy with lumph node dissection in 6/2023  --takes Ibrance and Faslodex injection.   --Followed by Dr. Breaux

## 2024-11-29 NOTE — ASSESSMENT & PLAN NOTE
Anemia is likely due to chronic disease due to Chronic Kidney Disease. Most recent hemoglobin and hematocrit are listed below.  Recent Labs     11/29/24  0635   HGB 9.6*   HCT 30.3*     Plan  - Monitor serial CBC: Daily  - Transfuse PRBC if patient becomes hemodynamically unstable, symptomatic or H/H drops below 7/21.  - Patient has not received any PRBC transfusions to date  - Patient's anemia is currently stable

## 2024-11-29 NOTE — Clinical Note
Diagnosis: CHF (congestive heart failure) [615549]   Future Attending Provider: VADIM PITTMAN [[id number]]   Reason for IP Medical Treatment  (Clinical interventions that can only be accomplished in the IP setting? ) :: Patient requires cardiac monitoring, supplemental oxygen, heparin drip, serial cardiac enzymes, diuresis, etc.   Special Needs:: Fall Risk [15]

## 2024-11-29 NOTE — Clinical Note
The DP pulses were 1+ bilaterally. The left PT pulse was non-palpable. The right PT pulse was 1+. The radial pulses were +2 bilaterally.

## 2024-11-29 NOTE — HPI
Abigail Babb is a 79 year old female with comorbid conditions of COPD, CHF, COPD and CAD who presented to ED for further evaluation of respiratory distress and hypoxia that began around 0400 this morning. Patient woke up very dyspneic and was not able to catch her breath. Per EMS, patient oxygen was 68 requiring NPPV via BIPAP. She does not wear supplemental oxygen. Patient is followed by Dr. Gama for CAD, CHF, and valvular disease. She does not take Lasix regularly due to CKD. She went to [place] yesterday for Thanksgiving dinner and did not restrict her sodium intake. She denies any symptoms prior to going to bed.     Imaging in ED consistent with CHF, and patient received 60mg IV Lasix. She has been weaned from Bipap to 3L nasal cannula. Elevated troponin and BNP noted.HM consulted for admission.

## 2024-11-29 NOTE — ASSESSMENT & PLAN NOTE
Presents with acute dyspnea and respiratory distress initially requiring NIPPV via BIPAP. CXR consistent with CHF and diuresed with positive response. Patient has been weaned to 3L nasal cannula. She does not take Lasix regularly due to kidney function.     Patient has  unknown type  heart failure that is Acute on chronic. On presentation their CHF was decompensated. Evidence of decompensated CHF on presentation includes: crackles on lung auscultation, paroxysmal nocturnal dyspnea (PND), dyspnea on exertion (CHANCE), and shortness of breath. The etiology of their decompensation is likely dietary indiscretion. Most recent BNP and echo results are listed below.  Recent Labs     11/29/24  0635   BNP 1,010*     Latest ECHO  Results for orders placed during the hospital encounter of 11/29/24    Echo Saline Bubble? No    Interpretation Summary    Left Ventricle: The left ventricle is normal in size. Normal wall thickness. There is concentric hypertrophy. Regional wall motion abnormalities present. Septal motion is consistent with bundle branch block. There is normal systolic function with a visually estimated ejection fraction of 55 - 60%. Ejection fraction is approximately 55%. Grade II diastolic dysfunction.    Right Ventricle: Normal right ventricular cavity size. Wall thickness is normal. Systolic function is normal.    Left Atrium: Left atrium is severely dilated.    Aortic Valve: There is mild stenosis. Aortic valve area by VTI is 1.5 cm². Aortic valve peak velocity is 3.0 m/s. Mean gradient is 21.5 mmHg. The dimensionless index is 0.40. There is mild aortic regurgitation.    Mitral Valve: There is mild to moderate regurgitation.    IVC/SVC: Intermediate venous pressure at 8 mmHg.    Current Heart Failure Medications  , ,   , , Oral  , , Oral  , , Oral  furosemide injection 40 mg, Every 12 hours, Intravenous  spironolactone tablet 25 mg, Daily, Oral    Plan  - Monitor strict I&Os and daily weights.    - Place on  telemetry  - Low sodium diet  - Place on fluid restriction of 1.5 L.   - Cardiology has been consulted  - The patient's volume status is uncontrolled  -follow Echocardiogram results, francesca

## 2024-11-29 NOTE — SUBJECTIVE & OBJECTIVE
Past Medical History:   Diagnosis Date    Breast cancer     CHF (congestive heart failure)     COPD (chronic obstructive pulmonary disease)     Hypertension        Surgical History  Left Mastectomy  Appendectomy  Carpal Tunnel Release  Tonsilllectomy    Review of patient's allergies indicates:   Allergen Reactions    Penicillins        No current facility-administered medications on file prior to encounter.     Current Outpatient Medications on File Prior to Encounter   Medication Sig    albuterol-ipratropium (DUO-NEB) 2.5 mg-0.5 mg/3 mL nebulizer solution 3 ML  .    aspirin (ECOTRIN) 81 MG EC tablet Take 1 tablet by mouth every morning.    azelastine (ASTELIN) 137 mcg (0.1 %) nasal spray 2 sprays.    carvediloL (COREG) 25 MG tablet TAKE ONE TABLET BY MOUTH TWICE DAILY THANK YOU    cetirizine (ZYRTEC) 10 MG tablet Take 10 mg by mouth.    clopidogreL (PLAVIX) 75 mg tablet Take 75 mg by mouth.    coQ10, ubiquinol, 100 mg Cap Take 1 capsule by mouth.    dextroamphetamine-amphetamine 10 mg Tab TAKE ONE TABLET BY MOUTH EVERY MORNING THANK YOU    epoetin soha (PROCRIT) 10,000 unit/mL injection Inject 10,000 Units into the skin.    FARXIGA 10 mg tablet Take 10 mg by mouth.    furosemide (LASIX) 40 MG tablet Take 40 mg by mouth.    gabapentin (NEURONTIN) 300 MG capsule TAKE ONE CAPSULE BY MOUTH EVERY NIGHT AT BEDTIME THANK YOU    HYDROcodone-acetaminophen (NORCO)  mg per tablet Take by mouth.    IBRANCE 100 mg Tab Take 100 mg by mouth.    isosorbide mononitrate (IMDUR) 30 MG 24 hr tablet Take 30 mg by mouth.    pantoprazole (PROTONIX) 40 MG tablet TAKE ONE TABLET BY MOUTH DAILY THANK YOU    promethazine-dextromethorphan (PROMETHAZINE-DM) 6.25-15 mg/5 mL Syrp Take by mouth every 8 (eight) hours as needed.    TRELEGY ELLIPTA 100-62.5-25 mcg DsDv Inhale 1 puff into the lungs.    VERQUVO 5 mg Tab Take 1 tablet by mouth.    [DISCONTINUED] spironolactone (ALDACTONE) 25 MG tablet TAKE ONE TABLET BY MOUTH DAILY THANK YOU     ALPRAZolam (XANAX) 0.5 MG tablet Take 0.5 mg by mouth daily as needed.    calcium-vitamin D 250 mg-2.5 mcg (100 unit) per tablet Take 1 tablet by mouth once daily. (Patient not taking: Reported on 11/29/2024)    rosuvastatin (CRESTOR) 20 MG tablet Take 20 mg by mouth.    [DISCONTINUED] metoprolol tartrate (LOPRESSOR) 25 MG tablet Take 25 mg by mouth.     Family History    None       Tobacco Use    Smoking status: Former     Types: Cigarettes    Smokeless tobacco: Never   Substance and Sexual Activity    Alcohol use: Not Currently    Drug use: Never    Sexual activity: Not on file     Review of Systems   Constitutional:  Positive for diaphoresis. Negative for activity change, fatigue and unexpected weight change.   HENT:  Negative for congestion, ear pain and sore throat.    Eyes: Negative.    Respiratory:  Positive for shortness of breath. Negative for wheezing.    Cardiovascular:  Negative for chest pain and palpitations.   Gastrointestinal:  Negative for abdominal pain, constipation, diarrhea and vomiting.   Endocrine: Negative.    Genitourinary:  Negative for flank pain, hematuria and urgency.   Musculoskeletal:  Negative for joint swelling and neck pain.   Skin:  Negative for pallor.   Neurological:  Negative for seizures, syncope and light-headedness.   Hematological: Negative.    Psychiatric/Behavioral: Negative.       Objective:     Vital Signs (Most Recent):  Temp: 98.1 °F (36.7 °C) (11/29/24 0847)  Pulse: 77 (11/29/24 0847)  Resp: (!) 22 (11/29/24 0847)  BP: (!) 187/76 (11/29/24 0847)  SpO2: 96 % (11/29/24 0847) Vital Signs (24h Range):  Temp:  [97.9 °F (36.6 °C)-98.1 °F (36.7 °C)] 98.1 °F (36.7 °C)  Pulse:  [62-93] 77  Resp:  [20-27] 22  SpO2:  [96 %-100 %] 96 %  BP: (122-187)/(58-90) 187/76     Weight: 83.5 kg (184 lb 1.4 oz)  Body mass index is 32.61 kg/m².     Physical Exam  Constitutional:       Appearance: She is well-developed.   HENT:      Head: Normocephalic and atraumatic.   Cardiovascular:       Rate and Rhythm: Normal rate and regular rhythm.      Heart sounds: Normal heart sounds. No murmur heard.  Pulmonary:      Effort: Tachypnea and respiratory distress (improved) present.      Breath sounds: Examination of the right-lower field reveals rales. Examination of the left-lower field reveals rales. Rales present.      Comments: Nasal cannula in place  Abdominal:      General: Bowel sounds are normal. There is no distension.      Palpations: Abdomen is soft.      Tenderness: There is no abdominal tenderness.   Musculoskeletal:         General: Normal range of motion.      Cervical back: Normal range of motion and neck supple.   Skin:     General: Skin is warm and dry.   Neurological:      Mental Status: She is alert and oriented to person, place, and time.                Significant Labs: All pertinent labs within the past 24 hours have been reviewed.  CBC:   Recent Labs   Lab 11/29/24  0635   WBC 5.91   HGB 9.6*   HCT 30.3*        CMP:   Recent Labs   Lab 11/29/24  0635      K 4.7      CO2 20*   *   BUN 33*   CREATININE 2.0*   CALCIUM 9.7   PROT 6.9   ALBUMIN 3.7   BILITOT 0.8   ALKPHOS 95   AST 48*   ALT 41   ANIONGAP 11       Significant Imaging:   Imaging Results              X-Ray Chest AP Portable (Final result)  Result time 11/29/24 07:35:57      Final result by Sunil Harvey MD (11/29/24 07:35:57)                   Impression:      As above.      Electronically signed by: Sunil Harvey  Date:    11/29/2024  Time:    07:35               Narrative:    EXAMINATION:  XR CHEST AP PORTABLE    CLINICAL HISTORY:  Asthma;.    TECHNIQUE:  Single frontal portable view of the chest was performed.    COMPARISON:  None    FINDINGS:  Moderate to severe cardiomegaly.    Pulmonary vascular congestion with interstitial edema suspicious for heart failure.  Slightly more confluent opacification right perihilar region raising question of superimposed infection/pneumonia.  No pleural effusion or  pneumothorax.

## 2024-11-29 NOTE — ASSESSMENT & PLAN NOTE
Creatine stable for now. BMP reviewed- noted Estimated Creatinine Clearance: 24.1 mL/min (A) (based on SCr of 2 mg/dL (H)). according to latest data. Based on current GFR, CKD stage is stage 3 - GFR 30-59.    --Patient is followed by Dr. Hair  --Hold Farxiga now for proteinuria   --Kidney function at baseline.   --Monitor UOP and serial BMP and adjust therapy as needed.   --Renally dose meds. Avoid nephrotoxic medications and procedures.

## 2024-11-29 NOTE — ASSESSMENT & PLAN NOTE
Recent Labs     11/29/24  0635 11/29/24  0903   TROPONINI 0.092* 1.001*   ?demand ischemia from CHF versus concern for cardiac ischemia  Follow trends  Echocardiogram pending  Heparin infusion initiated in ED  Cardiology input appreciated

## 2024-11-30 PROBLEM — I35.0 NONRHEUMATIC AORTIC VALVE STENOSIS: Status: ACTIVE | Noted: 2024-11-30

## 2024-11-30 PROBLEM — I21.4 NSTEMI (NON-ST ELEVATED MYOCARDIAL INFARCTION): Status: ACTIVE | Noted: 2024-11-29

## 2024-11-30 LAB
ANION GAP SERPL CALC-SCNC: 12 MMOL/L (ref 8–16)
APTT PPP: 48.4 SEC (ref 21–32)
BASOPHILS # BLD AUTO: 0.01 K/UL (ref 0–0.2)
BASOPHILS NFR BLD: 0.1 % (ref 0–1.9)
BUN SERPL-MCNC: 43 MG/DL (ref 8–23)
CALCIUM SERPL-MCNC: 9.8 MG/DL (ref 8.7–10.5)
CHLORIDE SERPL-SCNC: 103 MMOL/L (ref 95–110)
CO2 SERPL-SCNC: 25 MMOL/L (ref 23–29)
CREAT SERPL-MCNC: 2 MG/DL (ref 0.5–1.4)
DIFFERENTIAL METHOD BLD: ABNORMAL
EOSINOPHIL # BLD AUTO: 0 K/UL (ref 0–0.5)
EOSINOPHIL NFR BLD: 0 % (ref 0–8)
ERYTHROCYTE [DISTWIDTH] IN BLOOD BY AUTOMATED COUNT: 15.8 % (ref 11.5–14.5)
EST. GFR  (NO RACE VARIABLE): 25 ML/MIN/1.73 M^2
GLUCOSE SERPL-MCNC: 150 MG/DL (ref 70–110)
HCT VFR BLD AUTO: 27.3 % (ref 37–48.5)
HGB BLD-MCNC: 9.1 G/DL (ref 12–16)
IMM GRANULOCYTES # BLD AUTO: 0.03 K/UL (ref 0–0.04)
IMM GRANULOCYTES NFR BLD AUTO: 0.4 % (ref 0–0.5)
LYMPHOCYTES # BLD AUTO: 0.4 K/UL (ref 1–4.8)
LYMPHOCYTES NFR BLD: 4.5 % (ref 18–48)
MAGNESIUM SERPL-MCNC: 2 MG/DL (ref 1.6–2.6)
MCH RBC QN AUTO: 34.9 PG (ref 27–31)
MCHC RBC AUTO-ENTMCNC: 33.3 G/DL (ref 32–36)
MCV RBC AUTO: 105 FL (ref 82–98)
MONOCYTES # BLD AUTO: 0.7 K/UL (ref 0.3–1)
MONOCYTES NFR BLD: 7.9 % (ref 4–15)
NEUTROPHILS # BLD AUTO: 7.2 K/UL (ref 1.8–7.7)
NEUTROPHILS NFR BLD: 87.1 % (ref 38–73)
NRBC BLD-RTO: 0 /100 WBC
PLATELET # BLD AUTO: 239 K/UL (ref 150–450)
PMV BLD AUTO: 11.5 FL (ref 9.2–12.9)
POTASSIUM SERPL-SCNC: 3.8 MMOL/L (ref 3.5–5.1)
RBC # BLD AUTO: 2.61 M/UL (ref 4–5.4)
SODIUM SERPL-SCNC: 140 MMOL/L (ref 136–145)
TROPONIN I SERPL DL<=0.01 NG/ML-MCNC: 1.64 NG/ML (ref 0–0.03)
WBC # BLD AUTO: 8.25 K/UL (ref 3.9–12.7)

## 2024-11-30 PROCEDURE — 94640 AIRWAY INHALATION TREATMENT: CPT

## 2024-11-30 PROCEDURE — 63600175 PHARM REV CODE 636 W HCPCS: Performed by: NURSE PRACTITIONER

## 2024-11-30 PROCEDURE — 83735 ASSAY OF MAGNESIUM: CPT | Performed by: NURSE PRACTITIONER

## 2024-11-30 PROCEDURE — 85025 COMPLETE CBC W/AUTO DIFF WBC: CPT | Performed by: EMERGENCY MEDICINE

## 2024-11-30 PROCEDURE — 84484 ASSAY OF TROPONIN QUANT: CPT | Performed by: NURSE PRACTITIONER

## 2024-11-30 PROCEDURE — 80048 BASIC METABOLIC PNL TOTAL CA: CPT | Performed by: NURSE PRACTITIONER

## 2024-11-30 PROCEDURE — 63600175 PHARM REV CODE 636 W HCPCS: Performed by: EMERGENCY MEDICINE

## 2024-11-30 PROCEDURE — 85730 THROMBOPLASTIN TIME PARTIAL: CPT | Performed by: EMERGENCY MEDICINE

## 2024-11-30 PROCEDURE — 99900035 HC TECH TIME PER 15 MIN (STAT)

## 2024-11-30 PROCEDURE — 99233 SBSQ HOSP IP/OBS HIGH 50: CPT | Mod: ,,, | Performed by: INTERNAL MEDICINE

## 2024-11-30 PROCEDURE — 21400001 HC TELEMETRY ROOM

## 2024-11-30 PROCEDURE — 93010 ELECTROCARDIOGRAM REPORT: CPT | Mod: ,,, | Performed by: INTERNAL MEDICINE

## 2024-11-30 PROCEDURE — 93005 ELECTROCARDIOGRAM TRACING: CPT

## 2024-11-30 PROCEDURE — 94761 N-INVAS EAR/PLS OXIMETRY MLT: CPT

## 2024-11-30 PROCEDURE — 27000221 HC OXYGEN, UP TO 24 HOURS

## 2024-11-30 PROCEDURE — 25000003 PHARM REV CODE 250: Performed by: STUDENT IN AN ORGANIZED HEALTH CARE EDUCATION/TRAINING PROGRAM

## 2024-11-30 PROCEDURE — 25000242 PHARM REV CODE 250 ALT 637 W/ HCPCS: Performed by: NURSE PRACTITIONER

## 2024-11-30 PROCEDURE — 25000003 PHARM REV CODE 250: Performed by: NURSE PRACTITIONER

## 2024-11-30 PROCEDURE — 36415 COLL VENOUS BLD VENIPUNCTURE: CPT | Performed by: EMERGENCY MEDICINE

## 2024-11-30 RX ORDER — MUPIROCIN 20 MG/G
OINTMENT TOPICAL DAILY
Status: DISCONTINUED | OUTPATIENT
Start: 2024-11-30 | End: 2024-12-03 | Stop reason: HOSPADM

## 2024-11-30 RX ORDER — NAPROXEN SODIUM 220 MG/1
81 TABLET, FILM COATED ORAL DAILY
Status: DISCONTINUED | OUTPATIENT
Start: 2024-11-30 | End: 2024-12-03 | Stop reason: HOSPADM

## 2024-11-30 RX ORDER — CARVEDILOL 12.5 MG/1
25 TABLET ORAL 2 TIMES DAILY
Status: DISCONTINUED | OUTPATIENT
Start: 2024-11-30 | End: 2024-12-03 | Stop reason: HOSPADM

## 2024-11-30 RX ORDER — ISOSORBIDE MONONITRATE 30 MG/1
30 TABLET, EXTENDED RELEASE ORAL DAILY
Status: DISCONTINUED | OUTPATIENT
Start: 2024-11-30 | End: 2024-12-03 | Stop reason: HOSPADM

## 2024-11-30 RX ORDER — CETIRIZINE HYDROCHLORIDE 5 MG/1
5 TABLET ORAL DAILY
Status: DISCONTINUED | OUTPATIENT
Start: 2024-11-30 | End: 2024-12-03 | Stop reason: HOSPADM

## 2024-11-30 RX ORDER — BENZONATATE 100 MG/1
100 CAPSULE ORAL EVERY 8 HOURS PRN
Status: DISCONTINUED | OUTPATIENT
Start: 2024-11-30 | End: 2024-12-03 | Stop reason: HOSPADM

## 2024-11-30 RX ORDER — CLOPIDOGREL BISULFATE 75 MG/1
75 TABLET ORAL DAILY
Status: DISCONTINUED | OUTPATIENT
Start: 2024-11-30 | End: 2024-12-03 | Stop reason: HOSPADM

## 2024-11-30 RX ADMIN — CARVEDILOL 25 MG: 12.5 TABLET, FILM COATED ORAL at 09:11

## 2024-11-30 RX ADMIN — SENNOSIDES AND DOCUSATE SODIUM 1 TABLET: 50; 8.6 TABLET ORAL at 09:11

## 2024-11-30 RX ADMIN — ARFORMOTEROL TARTRATE 15 MCG: 15 SOLUTION RESPIRATORY (INHALATION) at 08:11

## 2024-11-30 RX ADMIN — HEPARIN SODIUM 12 UNITS/KG/HR: 10000 INJECTION, SOLUTION INTRAVENOUS at 10:11

## 2024-11-30 RX ADMIN — HYDROCODONE BITARTRATE AND ACETAMINOPHEN 1 TABLET: 10; 325 TABLET ORAL at 02:11

## 2024-11-30 RX ADMIN — FUROSEMIDE 40 MG: 10 INJECTION, SOLUTION INTRAMUSCULAR; INTRAVENOUS at 09:11

## 2024-11-30 RX ADMIN — BENZONATATE 100 MG: 100 CAPSULE ORAL at 09:11

## 2024-11-30 RX ADMIN — GABAPENTIN 100 MG: 100 CAPSULE ORAL at 09:11

## 2024-11-30 RX ADMIN — CLOPIDOGREL BISULFATE 75 MG: 75 TABLET ORAL at 09:11

## 2024-11-30 RX ADMIN — BENZONATATE 100 MG: 100 CAPSULE ORAL at 02:11

## 2024-11-30 RX ADMIN — MUPIROCIN: 20 OINTMENT TOPICAL at 09:11

## 2024-11-30 RX ADMIN — PANTOPRAZOLE SODIUM 40 MG: 40 TABLET, DELAYED RELEASE ORAL at 09:11

## 2024-11-30 RX ADMIN — ISOSORBIDE MONONITRATE 30 MG: 30 TABLET, EXTENDED RELEASE ORAL at 09:11

## 2024-11-30 RX ADMIN — BUDESONIDE INHALATION 0.5 MG: 0.5 SUSPENSION RESPIRATORY (INHALATION) at 08:11

## 2024-11-30 RX ADMIN — ATORVASTATIN CALCIUM 40 MG: 40 TABLET, FILM COATED ORAL at 09:11

## 2024-11-30 RX ADMIN — HYDROCODONE BITARTRATE AND ACETAMINOPHEN 1 TABLET: 10; 325 TABLET ORAL at 06:11

## 2024-11-30 RX ADMIN — ASPIRIN 81 MG CHEWABLE TABLET 81 MG: 81 TABLET CHEWABLE at 09:11

## 2024-11-30 RX ADMIN — CETIRIZINE HYDROCHLORIDE 5 MG: 5 TABLET ORAL at 09:11

## 2024-11-30 RX ADMIN — ARFORMOTEROL TARTRATE 15 MCG: 15 SOLUTION RESPIRATORY (INHALATION) at 07:11

## 2024-11-30 RX ADMIN — BUDESONIDE INHALATION 0.5 MG: 0.5 SUSPENSION RESPIRATORY (INHALATION) at 07:11

## 2024-11-30 RX ADMIN — Medication 6 MG: at 11:11

## 2024-11-30 NOTE — ASSESSMENT & PLAN NOTE
Echocardiogram with evidence of aortic stenosis that is mild . The patient's most recent echocardiogram result is listed below. We will manage the valvular abnormality by diuresis    Echo Saline Bubble? No    Result Date: 11/29/2024    Left Ventricle: The left ventricle is normal in size. Normal wall   thickness. There is concentric hypertrophy. Regional wall motion   abnormalities present. Septal motion is consistent with bundle branch   block. There is normal systolic function with a visually estimated   ejection fraction of 55 - 60%. Ejection fraction is approximately 55%.   Grade II diastolic dysfunction.    Right Ventricle: Normal right ventricular cavity size. Wall thickness   is normal. Systolic function is normal.    Left Atrium: Left atrium is severely dilated.    Aortic Valve: There is mild stenosis. Aortic valve area by VTI is 1.5   cm². Aortic valve peak velocity is 3.0 m/s. Mean gradient is 21.5 mmHg.   The dimensionless index is 0.40. There is mild aortic regurgitation.    Mitral Valve: There is mild to moderate regurgitation.    IVC/SVC: Intermediate venous pressure at 8 mmHg.

## 2024-11-30 NOTE — ASSESSMENT & PLAN NOTE
-Noted on EKG, no prior to compare  -TTE pending  -Continue OMT  -Records requested from Dr. Gama to review recent cath    11/30/2024  Her lbbb has resolved. After diuresis.

## 2024-11-30 NOTE — ASSESSMENT & PLAN NOTE
Recent Labs     11/29/24  0903 11/29/24  2104 11/30/24  0447   TROPONINI 1.001* 2.224* 1.635*     ?demand ischemia from CHF versus concern for cardiac ischemia  Follow trends  Echocardiogram pending  Heparin infusion initiated in ED  Cardiology input appreciated    11/30/23  Troponin trend consistent with NSTEM  Continue heparin infusion.   Cardiology will discuss need for heparin infusion  Continue ASA, Plavix, BB, and STATIN

## 2024-11-30 NOTE — ASSESSMENT & PLAN NOTE
Creatine stable for now. BMP reviewed- noted Estimated Creatinine Clearance: 24.2 mL/min (A) (based on SCr of 2 mg/dL (H)). according to latest data. Based on current GFR, CKD stage is stage 3 - GFR 30-59.    --Patient is followed by Dr. Hair  --Hold Farxiga now for proteinuria   --Kidney function at baseline.   --Monitor UOP and serial BMP and adjust therapy as needed.   --Renally dose meds. Avoid nephrotoxic medications and procedures.    11/30/24  Kidney function stable. francesca

## 2024-11-30 NOTE — ASSESSMENT & PLAN NOTE
-Secondary to decompensated CHF + underlying COPD  -Continue IV diuresis  -Continue nebs/pulmonary toilet  -Strict I's/O's  -TTE pending    11/30/2024  Improved with diuresis.

## 2024-11-30 NOTE — HOSPITAL COURSE
Abigail Babb is a 79 year old female who presented to ED with respiratory distress and hypoxia. Work up in ED consistent with decompensated heart failure. She required NIPPV via BIPAP and was aggressively diuresed resulting in reduction of oxygen requirements. EKG and echocardiogram with findings of left bundle branch block. Troponin elevated consistent with NSTEMI. Left bundle branch block has now resolved with treatment. Cardiology will discuss need for LHC.     12/1/24  Responding well to diuretics. Now on 3L NC. Blood pressure uncontrolled amlodipine added for optimization. Plans for LHC tomorrow morning. Contrast induced nephropathy discussed at length. Patient would like to proceed. Noted left upper extremity cellulitis from dog scratch. Will be treated with topical and PO abx.       CBC remains stable, WBC-4.82, H/H-9.5/29.5. PTT elevated- 55.3, BMP has improving renal function BUN/Cr-42/1.6. Cardiology plans to take for heart cath today. Patient aware and agreeable. UOP adequate response to diuresis.     Heart Cath- right main- 100% occlusion, distal left circ- 70%. Previous stent in left main intact and patent. Optimize and continue medical management, and risk factor reduction recommended. 12/2. On RA during morning visit, c/o of occasional breathlessness. Home O2 eval performed. Patient qualified, consult to CM and order for home O2 therapy placed.  Patient on aspirin, atorvastatin, Coreg, Plavix, Imdur.  She will follow up with Dr. Matamoros her primary cardiologist in 2 weeks.  With diuresis patient's symptoms improved.  Patient seen and examined on day of discharge and stable for discharge home with oxygen therapy.

## 2024-11-30 NOTE — ASSESSMENT & PLAN NOTE
-See plan under SOB  -TTE from 7/23 with normal EF, repeat pending  -Admits to eating Thanksgiving food  -On Lasix prn as OP, likely needs daily dose  -Continue OMT-BB, Farxiga  -Aldactone if creatinine permits, not on ACEi/ARB or Entresto as OP    11/30/2024  Improved clinically with diuresis will continue the same.

## 2024-11-30 NOTE — ASSESSMENT & PLAN NOTE
Presents with acute dyspnea and respiratory distress initially requiring NIPPV via BIPAP. CXR consistent with CHF and diuresed with positive response. Patient has been weaned to 3L nasal cannula. She does not take Lasix regularly due to kidney function.     Patient has  unknown type  heart failure that is Acute on chronic. On presentation their CHF was decompensated. Evidence of decompensated CHF on presentation includes: crackles on lung auscultation, paroxysmal nocturnal dyspnea (PND), dyspnea on exertion (CHANCE), and shortness of breath. The etiology of their decompensation is likely dietary indiscretion. Most recent BNP and echo results are listed below.  Recent Labs     11/29/24  0635   BNP 1,010*       Latest ECHO  Results for orders placed during the hospital encounter of 11/29/24    Echo Saline Bubble? No    Interpretation Summary    Left Ventricle: The left ventricle is normal in size. Normal wall thickness. There is concentric hypertrophy. Regional wall motion abnormalities present. Septal motion is consistent with bundle branch block. There is normal systolic function with a visually estimated ejection fraction of 55 - 60%. Ejection fraction is approximately 55%. Grade II diastolic dysfunction.    Right Ventricle: Normal right ventricular cavity size. Wall thickness is normal. Systolic function is normal.    Left Atrium: Left atrium is severely dilated.    Aortic Valve: There is mild stenosis. Aortic valve area by VTI is 1.5 cm². Aortic valve peak velocity is 3.0 m/s. Mean gradient is 21.5 mmHg. The dimensionless index is 0.40. There is mild aortic regurgitation.    Mitral Valve: There is mild to moderate regurgitation.    IVC/SVC: Intermediate venous pressure at 8 mmHg.    Current Heart Failure Medications  , ,   , , Oral  , , Oral  , , Oral  furosemide injection 40 mg, Every 12 hours, Intravenous  carvediloL tablet 25 mg, 2 times daily, Oral    Plan  - Monitor strict I&Os and daily weights.    - Place on  telemetry  - Low sodium diet  - Place on fluid restriction of 1.5 L.   - Cardiology has been consulted  - The patient's volume status is uncontrolled  -follow Echocardiogram results, diurese    11/30/24  Echocardiogram showed preserved EF, DD, and septal wall motion consistent with LBBB.  Patient is responding well to diuresis. Has diuresed 2.9L    Management as above

## 2024-11-30 NOTE — ASSESSMENT & PLAN NOTE
Patient with known CAD s/p stent placement. Will continue Statin and monitor for S/Sx of angina/ACS. Continue to monitor on telemetry.   --patient is followed by Dr. Brant Lundberg  --S/p PCI 10/2023    11/30/23   See plan for NSTEMI

## 2024-11-30 NOTE — ASSESSMENT & PLAN NOTE
-Initial troponin 0.092, repeat pending  -Elevation likely secondary to demand ischemia from hypoxia, decompensated CHF, COPD  -Continue heparin gtt, ASA, BB, statin, Imdur  -Reports [facility] 10/23 with PCI of   -TTE pending    11/30/2024  Had labs suggestive of nstemi   Has known 3 vessel cad need to discuss coronary angiography despite risk of contrast nephropathy

## 2024-11-30 NOTE — PROGRESS NOTES
[facility] - Telemetry ([facility])  Cardiology  Progress Note    Patient Name: Abigail Babb  MRN: [id number]  Admission Date: 11/29/2024  Hospital Length of Stay: 1 days  Code Status: Full Code   Attending Physician: Sanjay Schulz MD   Primary Care Physician: Faraz Nguyễn MD  Expected Discharge Date:   Principal Problem:Acute on chronic congestive heart failure    Subjective:     Hospital Course:   11/30/2024 has diuresed well duy dn examined denies any respiratory symptoms. Her lung exam improved. Her lbbb has resolved. She ruled in fo nstemi    Interval History: shortness of breath remarkably improved.    Review of Systems   Constitutional: Positive for malaise/fatigue. Negative for diaphoresis and weight gain.   HENT:  Negative for hoarse voice.    Eyes:  Negative for double vision and visual disturbance.   Cardiovascular:  Negative for chest pain, claudication, cyanosis, dyspnea on exertion, irregular heartbeat, leg swelling, near-syncope, orthopnea, palpitations, paroxysmal nocturnal dyspnea and syncope.   Respiratory:  Positive for shortness of breath (improved). Negative for cough, hemoptysis and snoring.    Hematologic/Lymphatic: Negative for bleeding problem. Does not bruise/bleed easily.   Skin:  Negative for color change and poor wound healing.   Musculoskeletal:  Negative for muscle cramps, muscle weakness and myalgias.   Gastrointestinal:  Negative for bloating, abdominal pain, change in bowel habit, diarrhea, heartburn, hematemesis, hematochezia, melena and nausea.   Neurological:  Negative for excessive daytime sleepiness, dizziness, headaches, light-headedness, loss of balance, numbness and weakness.   Psychiatric/Behavioral:  Negative for memory loss. The patient does not have insomnia.    Allergic/Immunologic: Negative for hives.     Objective:     Vital Signs (Most Recent):  Temp: 97.3 °F (36.3 °C) (11/30/24 0740)  Pulse: 64 (11/30/24 0938)  Resp: 18 (11/30/24 0847)  BP: (!) 189/90  (11/30/24 0903)  SpO2: 96 % (11/30/24 0847) Vital Signs (24h Range):  Temp:  [97.3 °F (36.3 °C)-98.4 °F (36.9 °C)] 97.3 °F (36.3 °C)  Pulse:  [57-76] 64  Resp:  [16-20] 18  SpO2:  [91 %-96 %] 96 %  BP: (133-189)/(60-90) 189/90     Weight: 84.4 kg (186 lb 1.1 oz)  Body mass index is 32.96 kg/m².     SpO2: 96 %         Intake/Output Summary (Last 24 hours) at 11/30/2024 1145  Last data filed at 11/30/2024 1102  Gross per 24 hour   Intake 240 ml   Output 3000 ml   Net -2760 ml       Lines/Drains/Airways       Drain  Duration             Female External Urinary Catheter w/ Suction 11/29/24 2224 <1 day              Peripheral Intravenous Line  Duration                  Peripheral IV - Single Lumen 11/29/24 0630 20 G Posterior;Right Hand 1 day         Peripheral IV - Single Lumen 11/29/24 0742 20 G Right Antecubital 1 day                       Physical Exam  Vitals and nursing note reviewed.   Constitutional:       General: She is not in acute distress.     Appearance: Normal appearance. She is well-developed. She is not ill-appearing.   HENT:      Head: Normocephalic and atraumatic.   Eyes:      General: No scleral icterus.     Pupils: Pupils are equal, round, and reactive to light.   Neck:      Thyroid: No thyromegaly.      Vascular: Normal carotid pulses. No carotid bruit, hepatojugular reflux or JVD.      Trachea: No tracheal deviation.   Cardiovascular:      Rate and Rhythm: Normal rate and regular rhythm.      Pulses: Normal pulses.      Heart sounds: Murmur heard.      Harsh midsystolic murmur is present with a grade of 2/6 at the upper right sternal border radiating to the neck.      High-pitched blowing decrescendo early diastolic murmur is present with a grade of 1/4 at the upper right sternal border radiating to the apex.      No friction rub. No gallop.   Pulmonary:      Effort: Pulmonary effort is normal. No respiratory distress.      Breath sounds: Rales present. No wheezing or rhonchi.      Comments: Left  breast surgically absent  Chest:      Chest wall: No tenderness.   Abdominal:      General: Bowel sounds are normal. There is no abdominal bruit.      Palpations: Abdomen is soft. There is no hepatomegaly or pulsatile mass.      Tenderness: There is no abdominal tenderness.   Musculoskeletal:      Right shoulder: No deformity.      Cervical back: Normal range of motion and neck supple.      Right lower leg: No edema.      Left lower leg: No edema.   Skin:     General: Skin is warm and dry.      Findings: No erythema or rash.      Nails: There is no clubbing.   Neurological:      General: No focal deficit present.      Mental Status: She is alert and oriented to person, place, and time.      Cranial Nerves: No cranial nerve deficit.      Coordination: Coordination normal.   Psychiatric:         Mood and Affect: Mood normal.         Speech: Speech normal.         Behavior: Behavior normal.            Significant Labs:   Recent Lab Results         11/30/24  0447   11/29/24  2104   11/29/24  1447        Anion Gap 12           PTT 48.4  Comment: Refer to local heparin nomogram for intensity/dose specific   therapeutic   range.     45.2  Comment: Refer to local heparin nomogram for intensity/dose specific   therapeutic   range.     47.0  Comment: Refer to local heparin nomogram for intensity/dose specific   therapeutic   range.         Baso # 0.01           Basophil % 0.1           BUN 43           Calcium 9.8           Chloride 103           CO2 25           Creatinine 2.0           Differential Method Automated           eGFR 25           Eos # 0.0           Eos % 0.0           Glucose 150           Gran # (ANC) 7.2           Gran % 87.1           Hematocrit 27.3           Hemoglobin 9.1           Immature Grans (Abs) 0.03  Comment: Mild elevation in immature granulocytes is non specific and   can be seen in a variety of conditions including stress response,   acute inflammation, trauma and pregnancy. Correlation with  other   laboratory and clinical findings is essential.             Immature Granulocytes 0.4           Lymph # 0.4           Lymph % 4.5           Magnesium  2.0           MCH 34.9           MCHC 33.3                      Mono # 0.7           Mono % 7.9           MPV 11.5           nRBC 0           Platelet Count 239           Potassium 3.8           RBC 2.61           RDW 15.8           Sodium 140           Troponin I 1.635  Comment: The reference interval for Troponin I represents the 99th percentile   cutoff   for our facility and is consistent with 3rd generation assay   performance.     2.224  Comment: The reference interval for Troponin I represents the 99th percentile   cutoff   for our facility and is consistent with 3rd generation assay   performance.           WBC 8.25                   Significant Imaging: Narrative & Impression  EXAMINATION:  XR CHEST AP PORTABLE     CLINICAL HISTORY:  Asthma;.     TECHNIQUE:  Single frontal portable view of the chest was performed.     COMPARISON:  None     FINDINGS:  Moderate to severe cardiomegaly.     Pulmonary vascular congestion with interstitial edema suspicious for heart failure.  Slightly more confluent opacification right perihilar region raising question of superimposed infection/pneumonia.  No pleural effusion or pneumothorax.     Impression:     As above.        Electronically signed by:Sunil Harvey  Date:                                            11/29/2024  Time:                                           07:35  Assessment and Plan:     Brief HPI: a 80 yo female with known cad aortic stenosis s/p lad stent with 3 vessel cad diastolic chf presents with decompensated chf and new onset lbbb nstemi  ckd that was diuresed her rchf decompensated improved her lbbb resolved. Will continue diuresis and make decision on addressing coronary angiography.    * Acute on chronic congestive heart failure  -See plan under SOB  -TTE from 7/23 with normal EF, repeat  pending  -Admits to eating Thanksgiving food  -On Lasix prn as OP, likely needs daily dose  -Continue OMT-BB, Farxiga  -Aldactone if creatinine permits, not on ACEi/ARB or Entresto as OP    11/30/2024  Improved clinically with diuresis will continue the same.     Nonrheumatic aortic valve stenosis  Has moderate aortic stenosis stable.    CKD (chronic kidney disease), stage III  Renal function stable with diuresis will monitor    Breast cancer  -Per primary team    Primary hypertension  -Continue home meds    LBBB (left bundle branch block)  -Noted on EKG, no prior to compare  -TTE pending  -Continue OMT  -Records requested from Dr. Gama to review recent cath    11/30/2024  Her lbbb has resolved. After diuresis.    CAD (coronary artery disease)  -See plan under elevated trop    Elevated troponin  -Initial troponin 0.092, repeat pending  -Elevation likely secondary to demand ischemia from hypoxia, decompensated CHF, COPD  -Continue heparin gtt, ASA, BB, statin, Imdur  -Reports [facility] 10/23 with PCI of   -TTE pending    11/30/2024  Had labs suggestive of nstemi   Has known 3 vessel cad need to discuss coronary angiography despite risk of contrast nephropathy    Acute respiratory failure with hypoxia  -Secondary to decompensated CHF + underlying COPD  -Continue IV diuresis  -Continue nebs/pulmonary toilet  -Strict I's/O's  -TTE pending    11/30/2024  Improved with diuresis.        VTE Risk Mitigation (From admission, onward)           Ordered     IP VTE HIGH RISK PATIENT  Once         11/29/24 1159     Place sequential compression device  Until discontinued         11/29/24 1159     heparin 25,000 units in dextrose 5% (100 units/ml) IV bolus from bag LOW INTENSITY nomogram - [facility]  As needed (PRN)        Question:  Heparin Infusion Adjustment (DO NOT MODIFY ANSWER)  Answer:  [email or web address]\epic\Images\Pharmacy\HeparinInfusions\heparin LOW INTENSITY nomogram for [facility] [id number].pdf    11/29/24 0725     heparin 25,000 units in  dextrose 5% (100 units/ml) IV bolus from bag LOW INTENSITY nomogram - [facility]  As needed (PRN)        Question:  Heparin Infusion Adjustment (DO NOT MODIFY ANSWER)  Answer:  [email or web address]\epic\Images\Pharmacy\HeparinInfusions\heparin LOW INTENSITY nomogram for [facility] [id number].pdf    11/29/24 0725     heparin 25,000 units in dextrose 5% 250 mL (100 units/mL) infusion LOW INTENSITY nomogram - [facility]  Continuous        Question:  Begin at (units/kg/hr)  Answer:  12    11/29/24 0725                    Mary Rao MD  Cardiology  [facility] - Telemetry ([facility])

## 2024-11-30 NOTE — PLAN OF CARE
Pt oriented x4. Vital signs stable; Did not use BIPAP, remained on 4L NC  Pt remained afebrile throughout this shift.   All meds administered per order.   Pt remained free of falls this shift.   Plan of care reviewed. Patient verbalizes understanding.   Pt moving/turning independently.   Bed in lowest position, upper side side rails up x 2, wheels locked  Call light in reach, bed alarm on.   Patient instructed to call staff for mobility/assistance.  Nonskid socks on when out of bed.  Patient education provided.  No further concerns voiced at this time.    Problem: Infection  Goal: Absence of Infection Signs and Symptoms  11/30/2024 0416 by Viridiana Kelley RN  Outcome: Progressing  11/30/2024 0416 by Viridiana Kelley RN  Outcome: Progressing     Problem: Skin Injury Risk Increased  Goal: Skin Health and Integrity  11/30/2024 0416 by Viridiana Kelley RN  Outcome: Progressing  11/30/2024 0416 by Viridiana Kelley RN  Outcome: Progressing     Problem: Adult Inpatient Plan of Care  Goal: Plan of Care Review  11/30/2024 0416 by Viridiana Kelley RN  Outcome: Progressing  11/30/2024 0416 by Viridiana Kelley RN  Outcome: Progressing  Goal: Patient-Specific Goal (Individualized)  11/30/2024 0416 by Viridiana Kelley RN  Outcome: Progressing  11/30/2024 0416 by Viridiana Kelley RN  Outcome: Progressing  Goal: Absence of Hospital-Acquired Illness or Injury  11/30/2024 0416 by Viridiana Kelley RN  Outcome: Progressing  11/30/2024 0416 by Viridiana Kelley RN  Outcome: Progressing  Goal: Optimal Comfort and Wellbeing  11/30/2024 0416 by Viridiana Kelley RN  Outcome: Progressing  11/30/2024 0416 by Viridiana Kelley RN  Outcome: Progressing  Goal: Readiness for Transition of Care  11/30/2024 0416 by Viridiana Kelley RN  Outcome: Progressing  11/30/2024 0416 by Viridiana Kelley RN  Outcome: Progressing     Problem: Fall Injury Risk  Goal: Absence of Fall and Fall-Related Injury  11/30/2024 0416 by Viridiana Kelley RN  Outcome: Progressing  11/30/2024 0416 by Viridiana Kelley  RN  Outcome: Progressing     Problem: Wound  Goal: Optimal Coping  11/30/2024 0416 by Viridiana Kelley RN  Outcome: Progressing  11/30/2024 0416 by Viridiana Kelley RN  Outcome: Progressing  Goal: Optimal Functional Ability  11/30/2024 0416 by Viridiana Kelley RN  Outcome: Progressing  11/30/2024 0416 by Viridiana Kelley RN  Outcome: Progressing  Goal: Absence of Infection Signs and Symptoms  11/30/2024 0416 by Viridiana Kelley RN  Outcome: Progressing  11/30/2024 0416 by Viridiana Kelley RN  Outcome: Progressing  Goal: Improved Oral Intake  11/30/2024 0416 by Viridiana Kelley RN  Outcome: Progressing  11/30/2024 0416 by Viridiana Kelley RN  Outcome: Progressing  Goal: Optimal Pain Control and Function  11/30/2024 0416 by Viridiana Kelley RN  Outcome: Progressing  11/30/2024 0416 by Viridiana Kelley, RN  Outcome: Progressing  Goal: Skin Health and Integrity  11/30/2024 0416 by Viridiana Kelley RN  Outcome: Progressing  11/30/2024 0416 by Viridiana Kelley RN  Outcome: Progressing  Goal: Optimal Wound Healing  11/30/2024 0416 by Viridiana Kelley RN  Outcome: Progressing  11/30/2024 0416 by Viridiana Kelley RN  Outcome: Progressing

## 2024-11-30 NOTE — PROGRESS NOTES
[facility] Medicine  Progress Note    Patient Name: Abigail Babb  MRN: [id number]  Patient Class: IP- Inpatient   Admission Date: 11/29/2024  Length of Stay: 1 days  Attending Physician: Sanjay Schulz MD  Primary Care Provider: Faraz Nguyễn MD        Subjective:     Principal Problem:Acute on chronic congestive heart failure        HPI:  Abigail Babb is a 79 year old female with comorbid conditions of COPD, CHF, COPD and CAD who presented to ED for further evaluation of respiratory distress and hypoxia that began around 0400 this morning. Patient woke up very dyspneic and was not able to catch her breath. Per EMS, patient oxygen was 68 requiring NPPV via BIPAP. She does not wear supplemental oxygen. Patient is followed by Dr. Gama for CAD, CHF, and valvular disease. She does not take Lasix regularly due to CKD. She went to [place] yesterday for Thanksgiving dinner and did not restrict her sodium intake. She denies any symptoms prior to going to bed.     Imaging in ED consistent with CHF, and patient received 60mg IV Lasix. She has been weaned from Bipap to 3L nasal cannula. Elevated troponin and BNP noted.HM consulted for admission.     Overview/Hospital Course:  Abigail Babb is a 79 year old female who presented to ED with respiratory distress and hypoxia. Work up in ED consistent with decompensated heart failure. She required NIPPV via BIPAP and was aggressively diuresed resulting in reduction of oxygen requirements. EKG and echocardiogram with findings of left bundle branch block. Troponin elevated consistent with NSTEMI. Left bundle branch block has now resolved with treatment. Cardiology will discuss need for LHC.     Interval History: patient has diuresed 2.9L. breathing has improved. Oxygen has been stable on 4L nasal cannula. Kidney function stable. Dicussed troponin trends and possible need for LHC.     Review of Systems   Constitutional:  Negative for  activity change, diaphoresis, fatigue and unexpected weight change.   HENT:  Negative for congestion, ear pain and sore throat.    Eyes: Negative.    Respiratory:  Positive for shortness of breath (improving). Negative for wheezing.    Cardiovascular:  Negative for chest pain and palpitations.   Gastrointestinal:  Negative for abdominal pain, constipation, diarrhea and vomiting.   Endocrine: Negative.    Genitourinary:  Negative for flank pain, hematuria and urgency.   Musculoskeletal:  Negative for joint swelling and neck pain.   Skin:  Negative for pallor.   Neurological:  Negative for seizures, syncope and light-headedness.   Hematological: Negative.    Psychiatric/Behavioral: Negative.       Objective:     Vital Signs (Most Recent):  Temp: 98 °F (36.7 °C) (11/30/24 1532)  Pulse: (!) 56 (11/30/24 1639)  Resp: 16 (11/30/24 1532)  BP: (!) 140/66 (11/30/24 1532)  SpO2: 95 % (11/30/24 1532) Vital Signs (24h Range):  Temp:  [97.3 °F (36.3 °C)-98.4 °F (36.9 °C)] 98 °F (36.7 °C)  Pulse:  [56-73] 56  Resp:  [16-20] 16  SpO2:  [91 %-96 %] 95 %  BP: (137-189)/(60-90) 140/66     Weight: 84.4 kg (186 lb 1.1 oz)  Body mass index is 32.96 kg/m².    Intake/Output Summary (Last 24 hours) at 11/30/2024 1643  Last data filed at 11/30/2024 1230  Gross per 24 hour   Intake 480 ml   Output 1700 ml   Net -1220 ml         Physical Exam  Constitutional:       Appearance: She is well-developed.   HENT:      Head: Normocephalic and atraumatic.   Cardiovascular:      Rate and Rhythm: Normal rate and regular rhythm.      Heart sounds: Normal heart sounds. No murmur heard.  Pulmonary:      Effort: No respiratory distress (improved).      Comments: Nasal cannula in place  Abdominal:      General: Bowel sounds are normal. There is no distension.      Palpations: Abdomen is soft.      Tenderness: There is no abdominal tenderness.   Musculoskeletal:         General: Normal range of motion.      Cervical back: Normal range of motion and neck  supple.   Skin:     General: Skin is warm and dry.   Neurological:      Mental Status: She is alert and oriented to person, place, and time.           Significant Labs: All pertinent labs within the past 24 hours have been reviewed.  CBC:   Recent Labs   Lab 11/29/24  0635 11/30/24  0447   WBC 5.91 8.25   HGB 9.6* 9.1*   HCT 30.3* 27.3*    239     CMP:   Recent Labs   Lab 11/29/24  0635 11/30/24  0447    140   K 4.7 3.8    103   CO2 20* 25   * 150*   BUN 33* 43*   CREATININE 2.0* 2.0*   CALCIUM 9.7 9.8   PROT 6.9  --    ALBUMIN 3.7  --    BILITOT 0.8  --    ALKPHOS 95  --    AST 48*  --    ALT 41  --    ANIONGAP 11 12       Significant Imaging:        Left Ventricle: The left ventricle is normal in size. Normal wall thickness. There is concentric hypertrophy. Regional wall motion abnormalities present. Septal motion is consistent with bundle branch block. There is normal systolic function with a visually estimated ejection fraction of 55 - 60%. Ejection fraction is approximately 55%. Grade II diastolic dysfunction.    Right Ventricle: Normal right ventricular cavity size. Wall thickness is normal. Systolic function is normal.    Left Atrium: Left atrium is severely dilated.    Aortic Valve: There is mild stenosis. Aortic valve area by VTI is 1.5 cm². Aortic valve peak velocity is 3.0 m/s. Mean gradient is 21.5 mmHg. The dimensionless index is 0.40. There is mild aortic regurgitation.    Mitral Valve: There is mild to moderate regurgitation.    IVC/SVC: Intermediate venous pressure at 8 mmHg.         Assessment/Plan:      * Acute on chronic congestive heart failure  Presents with acute dyspnea and respiratory distress initially requiring NIPPV via BIPAP. CXR consistent with CHF and diuresed with positive response. Patient has been weaned to 3L nasal cannula. She does not take Lasix regularly due to kidney function.     Patient has  unknown type  heart failure that is Acute on chronic. On  presentation their CHF was decompensated. Evidence of decompensated CHF on presentation includes: crackles on lung auscultation, paroxysmal nocturnal dyspnea (PND), dyspnea on exertion (CHANCE), and shortness of breath. The etiology of their decompensation is likely dietary indiscretion. Most recent BNP and echo results are listed below.  Recent Labs     11/29/24  0635   BNP 1,010*       Latest ECHO  Results for orders placed during the hospital encounter of 11/29/24    Echo Saline Bubble? No    Interpretation Summary    Left Ventricle: The left ventricle is normal in size. Normal wall thickness. There is concentric hypertrophy. Regional wall motion abnormalities present. Septal motion is consistent with bundle branch block. There is normal systolic function with a visually estimated ejection fraction of 55 - 60%. Ejection fraction is approximately 55%. Grade II diastolic dysfunction.    Right Ventricle: Normal right ventricular cavity size. Wall thickness is normal. Systolic function is normal.    Left Atrium: Left atrium is severely dilated.    Aortic Valve: There is mild stenosis. Aortic valve area by VTI is 1.5 cm². Aortic valve peak velocity is 3.0 m/s. Mean gradient is 21.5 mmHg. The dimensionless index is 0.40. There is mild aortic regurgitation.    Mitral Valve: There is mild to moderate regurgitation.    IVC/SVC: Intermediate venous pressure at 8 mmHg.    Current Heart Failure Medications  , ,   , , Oral  , , Oral  , , Oral  furosemide injection 40 mg, Every 12 hours, Intravenous  carvediloL tablet 25 mg, 2 times daily, Oral    Plan  - Monitor strict I&Os and daily weights.    - Place on telemetry  - Low sodium diet  - Place on fluid restriction of 1.5 L.   - Cardiology has been consulted  - The patient's volume status is uncontrolled  -follow Echocardiogram results, francesca    11/30/24  Echocardiogram showed preserved EF, DD, and septal wall motion consistent with LBBB.  Patient is responding well to  diuresis. Has diuresed 2.9L    Management as above    Nonrheumatic aortic valve stenosis  Echocardiogram with evidence of aortic stenosis that is mild . The patient's most recent echocardiogram result is listed below. We will manage the valvular abnormality by diuresis    Echo Saline Bubble? No    Result Date: 11/29/2024    Left Ventricle: The left ventricle is normal in size. Normal wall   thickness. There is concentric hypertrophy. Regional wall motion   abnormalities present. Septal motion is consistent with bundle branch   block. There is normal systolic function with a visually estimated   ejection fraction of 55 - 60%. Ejection fraction is approximately 55%.   Grade II diastolic dysfunction.    Right Ventricle: Normal right ventricular cavity size. Wall thickness   is normal. Systolic function is normal.    Left Atrium: Left atrium is severely dilated.    Aortic Valve: There is mild stenosis. Aortic valve area by VTI is 1.5   cm². Aortic valve peak velocity is 3.0 m/s. Mean gradient is 21.5 mmHg.   The dimensionless index is 0.40. There is mild aortic regurgitation.    Mitral Valve: There is mild to moderate regurgitation.    IVC/SVC: Intermediate venous pressure at 8 mmHg.          Anemia of chronic disease  Anemia is likely due to chronic disease due to Chronic Kidney Disease. Most recent hemoglobin and hematocrit are listed below.  Recent Labs     11/29/24  0635   HGB 9.6*   HCT 30.3*     Plan  - Monitor serial CBC: Daily  - Transfuse PRBC if patient becomes hemodynamically unstable, symptomatic or H/H drops below 7/21.  - Patient has not received any PRBC transfusions to date  - Patient's anemia is currently stable      COPD (chronic obstructive pulmonary disease)  Patient's COPD is controlled currently.  Patient is currently off COPD Pathway. Continue scheduled inhalers Steroids and Supplemental oxygen and monitor respiratory status closely.     CKD (chronic kidney disease), stage III  Creatine stable for  now. BMP reviewed- noted Estimated Creatinine Clearance: 24.2 mL/min (A) (based on SCr of 2 mg/dL (H)). according to latest data. Based on current GFR, CKD stage is stage 3 - GFR 30-59.    --Patient is followed by Dr. Hair  --Hold Farxiga now for proteinuria   --Kidney function at baseline.   --Monitor UOP and serial BMP and adjust therapy as needed.   --Renally dose meds. Avoid nephrotoxic medications and procedures.    11/30/24  Kidney function stable. diurese    Breast cancer  Left mastectomy with lumph node dissection in 6/2023  --takes Ibrance and Faslodex injection.   --Followed by Dr. Breaux    Primary hypertension  Patients blood pressure range in the last 24 hours was: BP  Min: 122/58  Max: 187/76.The patient's inpatient anti-hypertensive regimen is listed below:  Current Antihypertensives  nitroGLYCERIN 2% TD oint ointment 1 inch, Every 8 hours, Topical (Top)  , ,   , , Oral  , , Oral  furosemide injection 40 mg, Every 12 hours, Intravenous  spironolactone tablet 25 mg, Daily, Oral    Plan  - BP is controlled, no changes needed to their regimen      LBBB (left bundle branch block)  Noted on EKG  --patient has a history of CAD  --followed by Dr. Gama  Cardiology consulted    11/30/23  Also noted on Echocardiogram  Repeat today showed resolution of LBBB after diuresis  Cardiology will decide on need for [facility]      CAD (coronary artery disease)  Patient with known CAD s/p stent placement. Will continue Statin and monitor for S/Sx of angina/ACS. Continue to monitor on telemetry.   --patient is followed by Dr. Brant Lundberg  --S/p PCI 10/2023    11/30/23   See plan for NSTEMI    NSTEMI (non-ST elevated myocardial infarction)  Recent Labs     11/29/24  0903 11/29/24  2104 11/30/24  0447   TROPONINI 1.001* 2.224* 1.635*     ?demand ischemia from CHF versus concern for cardiac ischemia  Follow trends  Echocardiogram pending  Heparin infusion initiated in ED  Cardiology input  appreciated    11/30/23  Troponin trend consistent with NSTEM  Continue heparin infusion.   Cardiology will discuss need for heparin infusion  Continue ASA, Plavix, BB, and STATIN    Acute respiratory failure with hypoxia  Patient with Hypoxic Respiratory failure which is Acute.  she is not on home oxygen. Supplemental oxygen was provided and noted- Oxygen Concentration (%):  [40] 40    .   Signs/symptoms of respiratory failure include- tachypnea, increased work of breathing, respiratory distress, and use of accessory muscles. Contributing diagnoses includes - CHF Labs and images were reviewed. Patient Has recent ABG, which has been reviewed. Will treat underlying causes and adjust management of respiratory failure as follows-   --Treat underlying CHF      VTE Risk Mitigation (From admission, onward)           Ordered     IP VTE HIGH RISK PATIENT  Once         11/29/24 1159     Place sequential compression device  Until discontinued         11/29/24 1159     heparin 25,000 units in dextrose 5% (100 units/ml) IV bolus from bag LOW INTENSITY nomogram - [facility]  As needed (PRN)        Question:  Heparin Infusion Adjustment (DO NOT MODIFY ANSWER)  Answer:  [email or web address]\epic\Images\Pharmacy\HeparinInfusions\heparin LOW INTENSITY nomogram for [facility] [id number]    11/29/24 0725     heparin 25,000 units in dextrose 5% (100 units/ml) IV bolus from bag LOW INTENSITY nomogram - [facility]  As needed (PRN)        Question:  Heparin Infusion Adjustment (DO NOT MODIFY ANSWER)  Answer:  \\[email or web address]\epic\Images\Pharmacy\HeparinInfusions\heparin LOW INTENSITY nomogram for [facility] [id number].pdf    11/29/24 0725     heparin 25,000 units in dextrose 5% 250 mL (100 units/mL) infusion LOW INTENSITY nomogram - [facility]  Continuous        Question:  Begin at (units/kg/hr)  Answer:  12    11/29/24 0769                    Discharge Planning   JANEEN:      Code Status: Full Code   Is the patient medically ready for discharge?:     Reason for patient still in hospital  (select all that apply): Treatment  Discharge Plan A: Home with family                Pedro Pablo Mclaughlin NP  Department of Hospital Medicine   [facility] - Telemetry ([facility])

## 2024-11-30 NOTE — ASSESSMENT & PLAN NOTE
Noted on EKG  --patient has a history of CAD  --followed by Dr. Gama  Cardiology consulted    11/30/23  Also noted on Echocardiogram  Repeat today showed resolution of LBBB after diuresis  Cardiology will decide on need for LHC

## 2024-11-30 NOTE — NURSING
Assessed pt's LUE-forearm, observed an approx 12cm circular, warm, well-defined red stephany pt states was a result of a dog bite. Pt states she broke up a fight between her two dogs. Wound circled w/black sharpie. Provider notified & aware.

## 2024-11-30 NOTE — SUBJECTIVE & OBJECTIVE
Interval History: patient has diuresed 2.9L. breathing has improved. Oxygen has been stable on 4L nasal cannula. Kidney function stable. Dicussed troponin trends and possible need for LHC.     Review of Systems   Constitutional:  Negative for activity change, diaphoresis, fatigue and unexpected weight change.   HENT:  Negative for congestion, ear pain and sore throat.    Eyes: Negative.    Respiratory:  Positive for shortness of breath (improving). Negative for wheezing.    Cardiovascular:  Negative for chest pain and palpitations.   Gastrointestinal:  Negative for abdominal pain, constipation, diarrhea and vomiting.   Endocrine: Negative.    Genitourinary:  Negative for flank pain, hematuria and urgency.   Musculoskeletal:  Negative for joint swelling and neck pain.   Skin:  Negative for pallor.   Neurological:  Negative for seizures, syncope and light-headedness.   Hematological: Negative.    Psychiatric/Behavioral: Negative.       Objective:     Vital Signs (Most Recent):  Temp: 98 °F (36.7 °C) (11/30/24 1532)  Pulse: (!) 56 (11/30/24 1639)  Resp: 16 (11/30/24 1532)  BP: (!) 140/66 (11/30/24 1532)  SpO2: 95 % (11/30/24 1532) Vital Signs (24h Range):  Temp:  [97.3 °F (36.3 °C)-98.4 °F (36.9 °C)] 98 °F (36.7 °C)  Pulse:  [56-73] 56  Resp:  [16-20] 16  SpO2:  [91 %-96 %] 95 %  BP: (137-189)/(60-90) 140/66     Weight: 84.4 kg (186 lb 1.1 oz)  Body mass index is 32.96 kg/m².    Intake/Output Summary (Last 24 hours) at 11/30/2024 1643  Last data filed at 11/30/2024 1230  Gross per 24 hour   Intake 480 ml   Output 1700 ml   Net -1220 ml         Physical Exam  Constitutional:       Appearance: She is well-developed.   HENT:      Head: Normocephalic and atraumatic.   Cardiovascular:      Rate and Rhythm: Normal rate and regular rhythm.      Heart sounds: Normal heart sounds. No murmur heard.  Pulmonary:      Effort: No respiratory distress (improved).      Comments: Nasal cannula in place  Abdominal:      General: Bowel  sounds are normal. There is no distension.      Palpations: Abdomen is soft.      Tenderness: There is no abdominal tenderness.   Musculoskeletal:         General: Normal range of motion.      Cervical back: Normal range of motion and neck supple.   Skin:     General: Skin is warm and dry.   Neurological:      Mental Status: She is alert and oriented to person, place, and time.           Significant Labs: All pertinent labs within the past 24 hours have been reviewed.  CBC:   Recent Labs   Lab 11/29/24  0635 11/30/24  0447   WBC 5.91 8.25   HGB 9.6* 9.1*   HCT 30.3* 27.3*    239     CMP:   Recent Labs   Lab 11/29/24  0635 11/30/24  0447    140   K 4.7 3.8    103   CO2 20* 25   * 150*   BUN 33* 43*   CREATININE 2.0* 2.0*   CALCIUM 9.7 9.8   PROT 6.9  --    ALBUMIN 3.7  --    BILITOT 0.8  --    ALKPHOS 95  --    AST 48*  --    ALT 41  --    ANIONGAP 11 12       Significant Imaging:        Left Ventricle: The left ventricle is normal in size. Normal wall thickness. There is concentric hypertrophy. Regional wall motion abnormalities present. Septal motion is consistent with bundle branch block. There is normal systolic function with a visually estimated ejection fraction of 55 - 60%. Ejection fraction is approximately 55%. Grade II diastolic dysfunction.    Right Ventricle: Normal right ventricular cavity size. Wall thickness is normal. Systolic function is normal.    Left Atrium: Left atrium is severely dilated.    Aortic Valve: There is mild stenosis. Aortic valve area by VTI is 1.5 cm². Aortic valve peak velocity is 3.0 m/s. Mean gradient is 21.5 mmHg. The dimensionless index is 0.40. There is mild aortic regurgitation.    Mitral Valve: There is mild to moderate regurgitation.    IVC/SVC: Intermediate venous pressure at 8 mmHg.

## 2024-11-30 NOTE — CONSULTS
Diet Education: Low Sodium, Fluid Restriction Education  Time Spent: RD remote  Learners: Patient       Nutrition Education provided with handouts:   + Clinical Reference attachments to d/c documents  DASH diet   Low Sodium diet   Fluid restriction diet    Nutrition Related Social Determinants of Health: SDOH: Adequate food in home environment and None Identified      Comments:  Patient is on a low sodium, fluid restriction oral diet.  Meal consumption noted between 96425%.  Labs reviewed.  Allergies: NKFA  I/O since admit: -2970mL   Patient admitted with CHF diagnosis.  Patient with diuresis.  Patient educated on low sodium diet therapy but reported per chart notes that over Thanksgiving she did control her sodium intake.  Patient understands importance of compliance to low sodium diet therapy.  Education handouts attached to discharge paperwork.  RD to continue to monitor po intake and encourage diet education compliance.     Patient Active Problem List   Diagnosis    Acute respiratory failure with hypoxia    Acute on chronic congestive heart failure    Elevated troponin    CAD (coronary artery disease)    LBBB (left bundle branch block)    Primary hypertension    Breast cancer    CKD (chronic kidney disease), stage III    COPD (chronic obstructive pulmonary disease)    Anemia of chronic disease    Nonrheumatic aortic valve stenosis     Past Medical History:   Diagnosis Date    Breast cancer     CHF (congestive heart failure)     COPD (chronic obstructive pulmonary disease)     Hypertension     Nonrheumatic aortic valve stenosis 11/30/2024     BMP  Lab Results   Component Value Date     11/30/2024    K 3.8 11/30/2024     11/30/2024    CO2 25 11/30/2024    BUN 43 (H) 11/30/2024    CREATININE 2.0 (H) 11/30/2024    CALCIUM 9.8 11/30/2024    ANIONGAP 12 11/30/2024    EGFRNORACEVR 25 (A) 11/30/2024     Lab Results   Component Value Date    HGBA1C 5.1 11/29/2024     Lab Results   Component Value Date     CALCIUM 9.8 11/30/2024           All questions and concerns answered. Dietitian's contact information provided.       Follow-Up:Yes     Please Re-consult as needed        Thanks,  Linsey Flores, MS, RDN, LDN

## 2024-11-30 NOTE — HOSPITAL COURSE
11/30/2024 has diuresed well duy dn examined denies any respiratory symptoms. Her lung exam improved. Her lbbb has resolved. She ruled in fo nstemi    12/1/2024 seen and examined has improved clinically no shortness of breath no respiratory compromise. Her creatinine is nearing baseline.htn not controlled will add amlodipine    12/2/24 pt seen and examined no acute CV events reported, labs reviewed, chart reviewed. LHC planned for today    12/3/24  Patient seen and examined today, no acute CV events reported, s/p LHC with  LAD stent patent.  Denies any chest pain or CHF symptoms at this time.  Labs reviewed creatinine is stable

## 2024-12-01 PROBLEM — L03.114 LEFT ARM CELLULITIS: Status: ACTIVE | Noted: 2024-12-01

## 2024-12-01 LAB
ANION GAP SERPL CALC-SCNC: 11 MMOL/L (ref 8–16)
APTT PPP: 39.4 SEC (ref 21–32)
BASOPHILS # BLD AUTO: 0.02 K/UL (ref 0–0.2)
BASOPHILS NFR BLD: 0.3 % (ref 0–1.9)
BUN SERPL-MCNC: 45 MG/DL (ref 8–23)
CALCIUM SERPL-MCNC: 9.8 MG/DL (ref 8.7–10.5)
CHLORIDE SERPL-SCNC: 101 MMOL/L (ref 95–110)
CO2 SERPL-SCNC: 30 MMOL/L (ref 23–29)
CREAT SERPL-MCNC: 1.8 MG/DL (ref 0.5–1.4)
DIFFERENTIAL METHOD BLD: ABNORMAL
EOSINOPHIL # BLD AUTO: 0 K/UL (ref 0–0.5)
EOSINOPHIL NFR BLD: 0.4 % (ref 0–8)
ERYTHROCYTE [DISTWIDTH] IN BLOOD BY AUTOMATED COUNT: 16.2 % (ref 11.5–14.5)
EST. GFR  (NO RACE VARIABLE): 29 ML/MIN/1.73 M^2
GLUCOSE SERPL-MCNC: 95 MG/DL (ref 70–110)
HCT VFR BLD AUTO: 28.3 % (ref 37–48.5)
HGB BLD-MCNC: 9.2 G/DL (ref 12–16)
IMM GRANULOCYTES # BLD AUTO: 0.05 K/UL (ref 0–0.04)
IMM GRANULOCYTES NFR BLD AUTO: 0.6 % (ref 0–0.5)
LYMPHOCYTES # BLD AUTO: 0.7 K/UL (ref 1–4.8)
LYMPHOCYTES NFR BLD: 9 % (ref 18–48)
MCH RBC QN AUTO: 34.1 PG (ref 27–31)
MCHC RBC AUTO-ENTMCNC: 32.5 G/DL (ref 32–36)
MCV RBC AUTO: 105 FL (ref 82–98)
MONOCYTES # BLD AUTO: 0.8 K/UL (ref 0.3–1)
MONOCYTES NFR BLD: 9.6 % (ref 4–15)
NEUTROPHILS # BLD AUTO: 6.3 K/UL (ref 1.8–7.7)
NEUTROPHILS NFR BLD: 80.1 % (ref 38–73)
NRBC BLD-RTO: 0 /100 WBC
OHS QRS DURATION: 114 MS
OHS QTC CALCULATION: 433 MS
PLATELET # BLD AUTO: 249 K/UL (ref 150–450)
PMV BLD AUTO: 11 FL (ref 9.2–12.9)
POTASSIUM SERPL-SCNC: 3.5 MMOL/L (ref 3.5–5.1)
RBC # BLD AUTO: 2.7 M/UL (ref 4–5.4)
SODIUM SERPL-SCNC: 142 MMOL/L (ref 136–145)
WBC # BLD AUTO: 7.81 K/UL (ref 3.9–12.7)

## 2024-12-01 PROCEDURE — 85730 THROMBOPLASTIN TIME PARTIAL: CPT | Performed by: EMERGENCY MEDICINE

## 2024-12-01 PROCEDURE — 99233 SBSQ HOSP IP/OBS HIGH 50: CPT | Mod: ,,, | Performed by: INTERNAL MEDICINE

## 2024-12-01 PROCEDURE — 99900035 HC TECH TIME PER 15 MIN (STAT)

## 2024-12-01 PROCEDURE — 94761 N-INVAS EAR/PLS OXIMETRY MLT: CPT

## 2024-12-01 PROCEDURE — 25000003 PHARM REV CODE 250: Performed by: STUDENT IN AN ORGANIZED HEALTH CARE EDUCATION/TRAINING PROGRAM

## 2024-12-01 PROCEDURE — 63600175 PHARM REV CODE 636 W HCPCS: Performed by: NURSE PRACTITIONER

## 2024-12-01 PROCEDURE — 85025 COMPLETE CBC W/AUTO DIFF WBC: CPT | Performed by: EMERGENCY MEDICINE

## 2024-12-01 PROCEDURE — 25000242 PHARM REV CODE 250 ALT 637 W/ HCPCS: Performed by: NURSE PRACTITIONER

## 2024-12-01 PROCEDURE — 25000003 PHARM REV CODE 250: Performed by: NURSE PRACTITIONER

## 2024-12-01 PROCEDURE — 21400001 HC TELEMETRY ROOM

## 2024-12-01 PROCEDURE — 63600175 PHARM REV CODE 636 W HCPCS: Performed by: EMERGENCY MEDICINE

## 2024-12-01 PROCEDURE — 80048 BASIC METABOLIC PNL TOTAL CA: CPT | Performed by: NURSE PRACTITIONER

## 2024-12-01 PROCEDURE — 94640 AIRWAY INHALATION TREATMENT: CPT

## 2024-12-01 PROCEDURE — 25000003 PHARM REV CODE 250: Performed by: INTERNAL MEDICINE

## 2024-12-01 PROCEDURE — 27000221 HC OXYGEN, UP TO 24 HOURS

## 2024-12-01 RX ORDER — DOXYCYCLINE HYCLATE 100 MG
100 TABLET ORAL EVERY 12 HOURS
Status: DISCONTINUED | OUTPATIENT
Start: 2024-12-01 | End: 2024-12-03 | Stop reason: HOSPADM

## 2024-12-01 RX ORDER — FUROSEMIDE 40 MG/1
40 TABLET ORAL DAILY
Status: DISCONTINUED | OUTPATIENT
Start: 2024-12-02 | End: 2024-12-03 | Stop reason: HOSPADM

## 2024-12-01 RX ORDER — ALPRAZOLAM 0.5 MG/1
0.5 TABLET ORAL DAILY PRN
Status: DISCONTINUED | OUTPATIENT
Start: 2024-12-01 | End: 2024-12-03 | Stop reason: HOSPADM

## 2024-12-01 RX ORDER — AMLODIPINE BESYLATE 5 MG/1
5 TABLET ORAL DAILY
Status: DISCONTINUED | OUTPATIENT
Start: 2024-12-01 | End: 2024-12-03 | Stop reason: HOSPADM

## 2024-12-01 RX ADMIN — ALPRAZOLAM 0.5 MG: 0.5 TABLET ORAL at 10:12

## 2024-12-01 RX ADMIN — BUDESONIDE INHALATION 0.5 MG: 0.5 SUSPENSION RESPIRATORY (INHALATION) at 07:12

## 2024-12-01 RX ADMIN — BENZONATATE 100 MG: 100 CAPSULE ORAL at 12:12

## 2024-12-01 RX ADMIN — BENZONATATE 100 MG: 100 CAPSULE ORAL at 02:12

## 2024-12-01 RX ADMIN — HYDROCODONE BITARTRATE AND ACETAMINOPHEN 1 TABLET: 10; 325 TABLET ORAL at 08:12

## 2024-12-01 RX ADMIN — ASPIRIN 81 MG CHEWABLE TABLET 81 MG: 81 TABLET CHEWABLE at 08:12

## 2024-12-01 RX ADMIN — GABAPENTIN 100 MG: 100 CAPSULE ORAL at 08:12

## 2024-12-01 RX ADMIN — DOXYCYCLINE HYCLATE 100 MG: 100 TABLET, COATED ORAL at 08:12

## 2024-12-01 RX ADMIN — SENNOSIDES AND DOCUSATE SODIUM 1 TABLET: 50; 8.6 TABLET ORAL at 08:12

## 2024-12-01 RX ADMIN — ARFORMOTEROL TARTRATE 15 MCG: 15 SOLUTION RESPIRATORY (INHALATION) at 07:12

## 2024-12-01 RX ADMIN — AMLODIPINE BESYLATE 5 MG: 5 TABLET ORAL at 10:12

## 2024-12-01 RX ADMIN — HYDROCODONE BITARTRATE AND ACETAMINOPHEN 1 TABLET: 10; 325 TABLET ORAL at 02:12

## 2024-12-01 RX ADMIN — CLOPIDOGREL BISULFATE 75 MG: 75 TABLET ORAL at 08:12

## 2024-12-01 RX ADMIN — ATORVASTATIN CALCIUM 40 MG: 40 TABLET, FILM COATED ORAL at 08:12

## 2024-12-01 RX ADMIN — HEPARIN SODIUM 12 UNITS/KG/HR: 10000 INJECTION, SOLUTION INTRAVENOUS at 07:12

## 2024-12-01 RX ADMIN — BENZONATATE 100 MG: 100 CAPSULE ORAL at 08:12

## 2024-12-01 RX ADMIN — FUROSEMIDE 40 MG: 10 INJECTION, SOLUTION INTRAMUSCULAR; INTRAVENOUS at 08:12

## 2024-12-01 RX ADMIN — MUPIROCIN: 20 OINTMENT TOPICAL at 08:12

## 2024-12-01 RX ADMIN — PANTOPRAZOLE SODIUM 40 MG: 40 TABLET, DELAYED RELEASE ORAL at 08:12

## 2024-12-01 RX ADMIN — CARVEDILOL 25 MG: 12.5 TABLET, FILM COATED ORAL at 08:12

## 2024-12-01 RX ADMIN — ISOSORBIDE MONONITRATE 30 MG: 30 TABLET, EXTENDED RELEASE ORAL at 08:12

## 2024-12-01 RX ADMIN — HYDROCODONE BITARTRATE AND ACETAMINOPHEN 1 TABLET: 10; 325 TABLET ORAL at 05:12

## 2024-12-01 RX ADMIN — CETIRIZINE HYDROCHLORIDE 5 MG: 5 TABLET ORAL at 08:12

## 2024-12-01 NOTE — ASSESSMENT & PLAN NOTE
Recent Labs     11/29/24  0903 11/29/24  2104 11/30/24  0447   TROPONINI 1.001* 2.224* 1.635*     ?demand ischemia from CHF versus concern for cardiac ischemia  Follow trends  Echocardiogram pending  Heparin infusion initiated in ED  Cardiology input appreciated    11/30/23  Troponin trend consistent with NSTEM  Continue heparin infusion.   Cardiology will discuss need for heparin infusion  Continue ASA, Plavix, BB, and STATIN    12/1/24  Plans for LHC in AM

## 2024-12-01 NOTE — SUBJECTIVE & OBJECTIVE
Interval History: no shortness of breath    Review of Systems   Constitutional: Positive for malaise/fatigue.   Eyes:  Negative for blurred vision.   Cardiovascular:  Negative for chest pain, claudication, cyanosis, dyspnea on exertion, irregular heartbeat, leg swelling, near-syncope, orthopnea, palpitations and paroxysmal nocturnal dyspnea.   Respiratory:  Negative for cough, hemoptysis and shortness of breath.    Hematologic/Lymphatic: Negative for bleeding problem. Does not bruise/bleed easily.   Skin:  Negative for dry skin and itching.   Musculoskeletal:  Negative for falls, muscle weakness and myalgias.   Gastrointestinal:  Negative for abdominal pain, diarrhea, heartburn, hematemesis, hematochezia and melena.   Genitourinary:  Negative for flank pain and hematuria.   Neurological:  Negative for dizziness, focal weakness, headaches, light-headedness, numbness, paresthesias, seizures and weakness.   Psychiatric/Behavioral:  Negative for altered mental status and memory loss. The patient is not nervous/anxious.    Allergic/Immunologic: Negative for hives.     Objective:     Vital Signs (Most Recent):  Temp: 97.6 °F (36.4 °C) (12/01/24 0744)  Pulse: 60 (12/01/24 0822)  Resp: 18 (12/01/24 0744)  BP: (!) 181/74 (12/01/24 0822)  SpO2: 96 % (12/01/24 0744) Vital Signs (24h Range):  Temp:  [97.6 °F (36.4 °C)-98.1 °F (36.7 °C)] 97.6 °F (36.4 °C)  Pulse:  [52-63] 60  Resp:  [16-20] 18  SpO2:  [94 %-100 %] 96 %  BP: (140-184)/(65-77) 181/74     Weight: 84.4 kg (186 lb 1.1 oz)  Body mass index is 32.96 kg/m².     SpO2: 96 %         Intake/Output Summary (Last 24 hours) at 12/1/2024 1206  Last data filed at 12/1/2024 0830  Gross per 24 hour   Intake 720 ml   Output 2000 ml   Net -1280 ml       Lines/Drains/Airways       Drain  Duration             Female External Urinary Catheter w/ Suction 11/29/24 2224 1 day              Peripheral Intravenous Line  Duration                  Peripheral IV - Single Lumen 11/29/24 0630 20  G Posterior;Right Hand 2 days         Peripheral IV - Single Lumen 11/29/24 0742 20 G Right Antecubital 2 days                       Physical Exam  Constitutional:       General: She is not in acute distress.     Appearance: Normal appearance. She is well-developed. She is not ill-appearing.      Comments: She is anxious.   HENT:      Head: Normocephalic and atraumatic.   Eyes:      General: No scleral icterus.     Pupils: Pupils are equal, round, and reactive to light.   Neck:      Thyroid: No thyromegaly.      Vascular: Normal carotid pulses. No carotid bruit, hepatojugular reflux or JVD.      Trachea: No tracheal deviation.   Cardiovascular:      Rate and Rhythm: Normal rate and regular rhythm.      Pulses: Normal pulses.      Heart sounds: Normal heart sounds. No murmur heard.     No friction rub. No gallop.   Pulmonary:      Effort: Pulmonary effort is normal. No respiratory distress.      Breath sounds: Rales (few at bases remarkably improved.) present. No wheezing or rhonchi.   Chest:      Chest wall: No tenderness.   Abdominal:      General: Bowel sounds are normal. There is no abdominal bruit.      Palpations: Abdomen is soft. There is no hepatomegaly or pulsatile mass.      Tenderness: There is no abdominal tenderness.   Musculoskeletal:      Right shoulder: No deformity.      Cervical back: Normal range of motion and neck supple.      Right lower leg: No edema.      Left lower leg: No edema.   Skin:     General: Skin is warm and dry.      Findings: No erythema or rash.      Nails: There is no clubbing.   Neurological:      General: No focal deficit present.      Mental Status: She is alert and oriented to person, place, and time.      Cranial Nerves: No cranial nerve deficit.      Coordination: Coordination normal.   Psychiatric:         Mood and Affect: Mood normal.         Speech: Speech normal.         Behavior: Behavior normal.            Significant Labs:   Recent Lab Results         12/01/24  0636         Anion Gap 11       PTT 39.4  Comment: Refer to local heparin nomogram for intensity/dose specific   therapeutic   range.         Baso # 0.02       Basophil % 0.3       BUN 45       Calcium 9.8       Chloride 101       CO2 30       Creatinine 1.8       Differential Method Automated       eGFR 29       Eos # 0.0       Eos % 0.4       Glucose 95       Gran # (ANC) 6.3       Gran % 80.1       Hematocrit 28.3       Hemoglobin 9.2       Immature Grans (Abs) 0.05  Comment: Mild elevation in immature granulocytes is non specific and   can be seen in a variety of conditions including stress response,   acute inflammation, trauma and pregnancy. Correlation with other   laboratory and clinical findings is essential.         Immature Granulocytes 0.6       Lymph # 0.7       Lymph % 9.0       MCH 34.1       MCHC 32.5              Mono # 0.8       Mono % 9.6       MPV 11.0       nRBC 0       Platelet Count 249       Potassium 3.5       RBC 2.70       RDW 16.2       Sodium 142       WBC 7.81               Significant Imaging: X-Ray: CXR: X-Ray Chest 1 View (CXR): No results found for this visit on 11/29/24.

## 2024-12-01 NOTE — PROGRESS NOTES
[facility] - Telemetry ([facility])  Cardiology  Progress Note    Patient Name: Abigail Babb  MRN: [id number]  Admission Date: 11/29/2024  Hospital Length of Stay: 2 days  Code Status: Full Code   Attending Physician: Sanjay Schulz MD   Primary Care Physician: Faraz Nguyễn MD  Expected Discharge Date:   Principal Problem:Acute on chronic congestive heart failure    Subjective:     Hospital Course:   11/30/2024 has diuresed well duy dn examined denies any respiratory symptoms. Her lung exam improved. Her lbbb has resolved. She ruled in fo nstemi    12/1/2024 duy nd examined has improved clinically no shortness of breath no respiratory compromise. Her creatinine is nearing baseline.htn not controlled will add amlodipine    Interval History: no shortness of breath    Review of Systems   Constitutional: Positive for malaise/fatigue.   Eyes:  Negative for blurred vision.   Cardiovascular:  Negative for chest pain, claudication, cyanosis, dyspnea on exertion, irregular heartbeat, leg swelling, near-syncope, orthopnea, palpitations and paroxysmal nocturnal dyspnea.   Respiratory:  Negative for cough, hemoptysis and shortness of breath.    Hematologic/Lymphatic: Negative for bleeding problem. Does not bruise/bleed easily.   Skin:  Negative for dry skin and itching.   Musculoskeletal:  Negative for falls, muscle weakness and myalgias.   Gastrointestinal:  Negative for abdominal pain, diarrhea, heartburn, hematemesis, hematochezia and melena.   Genitourinary:  Negative for flank pain and hematuria.   Neurological:  Negative for dizziness, focal weakness, headaches, light-headedness, numbness, paresthesias, seizures and weakness.   Psychiatric/Behavioral:  Negative for altered mental status and memory loss. The patient is not nervous/anxious.    Allergic/Immunologic: Negative for hives.     Objective:     Vital Signs (Most Recent):  Temp: 97.6 °F (36.4 °C) (12/01/24 0744)  Pulse: 60 (12/01/24 0822)  Resp: 18 (12/01/24  0744)  BP: (!) 181/74 (12/01/24 0822)  SpO2: 96 % (12/01/24 0744) Vital Signs (24h Range):  Temp:  [97.6 °F (36.4 °C)-98.1 °F (36.7 °C)] 97.6 °F (36.4 °C)  Pulse:  [52-63] 60  Resp:  [16-20] 18  SpO2:  [94 %-100 %] 96 %  BP: (140-184)/(65-77) 181/74     Weight: 84.4 kg (186 lb 1.1 oz)  Body mass index is 32.96 kg/m².     SpO2: 96 %         Intake/Output Summary (Last 24 hours) at 12/1/2024 1206  Last data filed at 12/1/2024 0830  Gross per 24 hour   Intake 720 ml   Output 2000 ml   Net -1280 ml       Lines/Drains/Airways       Drain  Duration             Female External Urinary Catheter w/ Suction 11/29/24 2224 1 day              Peripheral Intravenous Line  Duration                  Peripheral IV - Single Lumen 11/29/24 0630 20 G Posterior;Right Hand 2 days         Peripheral IV - Single Lumen 11/29/24 0742 20 G Right Antecubital 2 days                       Physical Exam  Constitutional:       General: She is not in acute distress.     Appearance: Normal appearance. She is well-developed. She is not ill-appearing.      Comments: She is anxious.   HENT:      Head: Normocephalic and atraumatic.   Eyes:      General: No scleral icterus.     Pupils: Pupils are equal, round, and reactive to light.   Neck:      Thyroid: No thyromegaly.      Vascular: Normal carotid pulses. No carotid bruit, hepatojugular reflux or JVD.      Trachea: No tracheal deviation.   Cardiovascular:      Rate and Rhythm: Normal rate and regular rhythm.      Pulses: Normal pulses.      Heart sounds: Normal heart sounds. No murmur heard.     No friction rub. No gallop.   Pulmonary:      Effort: Pulmonary effort is normal. No respiratory distress.      Breath sounds: Rales (few at bases remarkably improved.) present. No wheezing or rhonchi.   Chest:      Chest wall: No tenderness.   Abdominal:      General: Bowel sounds are normal. There is no abdominal bruit.      Palpations: Abdomen is soft. There is no hepatomegaly or pulsatile mass.       Tenderness: There is no abdominal tenderness.   Musculoskeletal:      Right shoulder: No deformity.      Cervical back: Normal range of motion and neck supple.      Right lower leg: No edema.      Left lower leg: No edema.   Skin:     General: Skin is warm and dry.      Findings: No erythema or rash.      Nails: There is no clubbing.   Neurological:      General: No focal deficit present.      Mental Status: She is alert and oriented to person, place, and time.      Cranial Nerves: No cranial nerve deficit.      Coordination: Coordination normal.   Psychiatric:         Mood and Affect: Mood normal.         Speech: Speech normal.         Behavior: Behavior normal.            Significant Labs:   Recent Lab Results         12/01/24  0636        Anion Gap 11       PTT 39.4  Comment: Refer to local heparin nomogram for intensity/dose specific   therapeutic   range.         Baso # 0.02       Basophil % 0.3       BUN 45       Calcium 9.8       Chloride 101       CO2 30       Creatinine 1.8       Differential Method Automated       eGFR 29       Eos # 0.0       Eos % 0.4       Glucose 95       Gran # (ANC) 6.3       Gran % 80.1       Hematocrit 28.3       Hemoglobin 9.2       Immature Grans (Abs) 0.05  Comment: Mild elevation in immature granulocytes is non specific and   can be seen in a variety of conditions including stress response,   acute inflammation, trauma and pregnancy. Correlation with other   laboratory and clinical findings is essential.         Immature Granulocytes 0.6       Lymph # 0.7       Lymph % 9.0       MCH 34.1       MCHC 32.5              Mono # 0.8       Mono % 9.6       MPV 11.0       nRBC 0       Platelet Count 249       Potassium 3.5       RBC 2.70       RDW 16.2       Sodium 142       WBC 7.81               Significant Imaging: X-Ray: CXR: X-Ray Chest 1 View (CXR): No results found for this visit on 11/29/24.  Assessment and Plan:     Brief HPI: a 78 yo female with moderate aortic  stenosis cad ckd presents with chf lbbb nstemi was diuresed lbbb resolved. She deserves coronary angiography and rhc will plan in am pending renal function her diuretics have been adjusted down to PO .DISCUSSED R/LHC AND CONTRAST NEPHROPATHY. SHE IS NERVOUS BUT WOULD LIKE TO PROCEED.  I have explained the risks, benefits , and alternatives of the procedure in detail.the patient voices understanding and all questions have been answered.the patient agrees to proceed as planned.     * Acute on chronic congestive heart failure  -See plan under SOB  -TTE from 7/23 with normal EF, repeat pending  -Admits to eating Thanksgiving food  -On Lasix prn as OP, likely needs daily dose  -Continue OMT-BB, Farxiga  -Aldactone if creatinine permits, not on ACEi/ARB or Entresto as OP    11/30/2024  Improved clinically with diuresis will continue the same.     Nonrheumatic aortic valve stenosis  Has moderate aortic stenosis stable.    CKD (chronic kidney disease), stage III  Renal function stable with diuresis will monitor    Breast cancer  -Per primary team    Primary hypertension  -Continue home meds    12/1/2024 will add amlodipine     LBBB (left bundle branch block)  -Noted on EKG, no prior to compare  -TTE pending  -Continue OMT  -Records requested from Dr. Gama to review recent cath    11/30/2024  Her lbbb has resolved. After diuresis.    CAD (coronary artery disease)  -See plan under elevated trop    NSTEMI (non-ST elevated myocardial infarction)  -Initial troponin 0.092, repeat pending  -Elevation likely secondary to demand ischemia from hypoxia, decompensated CHF, COPD  -Continue heparin gtt, ASA, BB, statin, Imdur  -Reports [facility] 10/23 with PCI of   -TTE pending    11/30/2024  Had labs suggestive of nstemi   Has known 3 vessel cad need to discuss coronary angiography despite risk of contrast nephropathy    12/1/2024  Will plan on coronary angiography and rhc in am pending renal function in am.   Discussed contrast  nephropathy    Acute respiratory failure with hypoxia  -Secondary to decompensated CHF + underlying COPD  -Continue IV diuresis  -Continue nebs/pulmonary toilet  -Strict I's/O's  -TTE pending    11/30/2024  Improved with diuresis.        VTE Risk Mitigation (From admission, onward)           Ordered     IP VTE HIGH RISK PATIENT  Once         11/29/24 1159     Place sequential compression device  Until discontinued         11/29/24 1159     heparin 25,000 units in dextrose 5% (100 units/ml) IV bolus from bag LOW INTENSITY nomogram - [facility]  As needed (PRN)        Question:  Heparin Infusion Adjustment (DO NOT MODIFY ANSWER)  Answer:  [email or web address]\epic\Images\Pharmacy\HeparinInfusions\heparin LOW INTENSITY nomogram for [facility] [id number]    11/29/24 0725     heparin 25,000 units in dextrose 5% (100 units/ml) IV bolus from bag LOW INTENSITY nomogram - [facility]  As needed (PRN)        Question:  Heparin Infusion Adjustment (DO NOT MODIFY ANSWER)  Answer:  [email or web address]\epic\Images\Pharmacy\HeparinInfusions\heparin LOW INTENSITY nomogram for [facility] [id number].pdf    11/29/24 0725     heparin 25,000 units in dextrose 5% 250 mL (100 units/mL) infusion LOW INTENSITY nomogram - [facility]  Continuous        Question:  Begin at (units/kg/hr)  Answer:  12    11/29/24 0725                    Mary Rao MD  Cardiology  [facility] - Telemetry ([facility])

## 2024-12-01 NOTE — ASSESSMENT & PLAN NOTE
Anemia is likely due to chronic disease due to Chronic Kidney Disease. Most recent hemoglobin and hematocrit are listed below.  Recent Labs     11/29/24  0635 11/30/24  0447 12/01/24  0636   HGB 9.6* 9.1* 9.2*   HCT 30.3* 27.3* 28.3*       Plan  - Monitor serial CBC: Daily  - Transfuse PRBC if patient becomes hemodynamically unstable, symptomatic or H/H drops below 7/21.  - Patient has not received any PRBC transfusions to date  - Patient's anemia is currently stable

## 2024-12-01 NOTE — ASSESSMENT & PLAN NOTE
Presents with acute dyspnea and respiratory distress initially requiring NIPPV via BIPAP. CXR consistent with CHF and diuresed with positive response. Patient has been weaned to 3L nasal cannula. She does not take Lasix regularly due to kidney function.     Patient has  unknown type  heart failure that is Acute on chronic. On presentation their CHF was decompensated. Evidence of decompensated CHF on presentation includes: crackles on lung auscultation, paroxysmal nocturnal dyspnea (PND), dyspnea on exertion (CHANCE), and shortness of breath. The etiology of their decompensation is likely dietary indiscretion. Most recent BNP and echo results are listed below.  Recent Labs     11/29/24  0635   BNP 1,010*       Latest ECHO  Results for orders placed during the hospital encounter of 11/29/24    Echo Saline Bubble? No    Interpretation Summary    Left Ventricle: The left ventricle is normal in size. Normal wall thickness. There is concentric hypertrophy. Regional wall motion abnormalities present. Septal motion is consistent with bundle branch block. There is normal systolic function with a visually estimated ejection fraction of 55 - 60%. Ejection fraction is approximately 55%. Grade II diastolic dysfunction.    Right Ventricle: Normal right ventricular cavity size. Wall thickness is normal. Systolic function is normal.    Left Atrium: Left atrium is severely dilated.    Aortic Valve: There is mild stenosis. Aortic valve area by VTI is 1.5 cm². Aortic valve peak velocity is 3.0 m/s. Mean gradient is 21.5 mmHg. The dimensionless index is 0.40. There is mild aortic regurgitation.    Mitral Valve: There is mild to moderate regurgitation.    IVC/SVC: Intermediate venous pressure at 8 mmHg.    Current Heart Failure Medications  , ,   , , Oral  , , Oral  , , Oral  carvediloL tablet 25 mg, 2 times daily, Oral  furosemide tablet 40 mg, Daily, Oral    Plan  - Monitor strict I&Os and daily weights.    - Place on telemetry  - Low  sodium diet  - Place on fluid restriction of 1.5 L.   - Cardiology has been consulted  - The patient's volume status is uncontrolled  -follow Echocardiogram results, diurese    11/30/24  Echocardiogram showed preserved EF, DD, and septal wall motion consistent with LBBB.  Patient is responding well to diuresis. Has diuresed 2.9L    Management as above    12/1/24  Responding well. Output 4.4L. repeat CXR pending. Reduce Lasix to daily

## 2024-12-01 NOTE — ASSESSMENT & PLAN NOTE
Patient with Hypoxic Respiratory failure which is Acute.  she is not on home oxygen. Supplemental oxygen was provided and noted-      .   Signs/symptoms of respiratory failure include- tachypnea, increased work of breathing, respiratory distress, and use of accessory muscles. Contributing diagnoses includes - CHF Labs and images were reviewed. Patient Has recent ABG, which has been reviewed. Will treat underlying causes and adjust management of respiratory failure as follows-   --Treat underlying CHF    12/1/24  Weaning. On 3L nasal cannula

## 2024-12-01 NOTE — SUBJECTIVE & OBJECTIVE
Interval History: breathing is better. Nervous about [facility] tomorrow. Understands risk of contrast induced nephropathy. Wean Lasix.    Review of Systems   Constitutional:  Negative for activity change, diaphoresis, fatigue and unexpected weight change.   HENT:  Negative for congestion, ear pain and sore throat.    Eyes: Negative.    Respiratory:  Positive for shortness of breath (improving). Negative for wheezing.    Cardiovascular:  Negative for chest pain and palpitations.   Gastrointestinal:  Negative for abdominal pain, constipation, diarrhea and vomiting.   Endocrine: Negative.    Genitourinary:  Negative for flank pain, hematuria and urgency.   Musculoskeletal:  Negative for joint swelling and neck pain.   Skin:  Positive for color change (erythema). Negative for pallor.   Neurological:  Negative for seizures, syncope and light-headedness.   Hematological: Negative.    Psychiatric/Behavioral: Negative.       Objective:     Vital Signs (Most Recent):  Temp: 98.6 °F (37 °C) (12/01/24 1225)  Pulse: 62 (12/01/24 1225)  Resp: 18 (12/01/24 1225)  BP: (!) 161/68 (12/01/24 1225)  SpO2: (!) 93 % (12/01/24 1225) Vital Signs (24h Range):  Temp:  [97.6 °F (36.4 °C)-98.6 °F (37 °C)] 98.6 °F (37 °C)  Pulse:  [52-63] 62  Resp:  [16-20] 18  SpO2:  [93 %-100 %] 93 %  BP: (140-184)/(65-77) 161/68     Weight: 84.4 kg (186 lb 1.1 oz)  Body mass index is 32.96 kg/m².    Intake/Output Summary (Last 24 hours) at 12/1/2024 1254  Last data filed at 12/1/2024 0830  Gross per 24 hour   Intake 480 ml   Output 2000 ml   Net -1520 ml         Physical Exam  Constitutional:       Appearance: She is well-developed.   HENT:      Head: Normocephalic and atraumatic.   Cardiovascular:      Rate and Rhythm: Normal rate and regular rhythm.      Heart sounds: Normal heart sounds. No murmur heard.  Pulmonary:      Effort: No respiratory distress (improved).      Comments: Nasal cannula in place  Abdominal:      General: Bowel sounds are normal. There is  no distension.      Palpations: Abdomen is soft.      Tenderness: There is no abdominal tenderness.   Musculoskeletal:         General: Normal range of motion.      Cervical back: Normal range of motion and neck supple.   Skin:     General: Skin is warm and dry.      Comments: Left forearm erythema-area marked   Neurological:      Mental Status: She is alert and oriented to person, place, and time.             Significant Labs: All pertinent labs within the past 24 hours have been reviewed.  CBC:   Recent Labs   Lab 11/30/24 0447 12/01/24  0636   WBC 8.25 7.81   HGB 9.1* 9.2*   HCT 27.3* 28.3*    249     CMP:   Recent Labs   Lab 11/30/24  0447 12/01/24  0636    142   K 3.8 3.5    101   CO2 25 30*   * 95   BUN 43* 45*   CREATININE 2.0* 1.8*   CALCIUM 9.8 9.8   ANIONGAP 12 11       Significant Imaging: I have reviewed all pertinent imaging results/findings within the past 24 hours.

## 2024-12-01 NOTE — PLAN OF CARE
Pt oriented x4. Vital signs stable  Pt remained afebrile throughout this shift.   All meds administered per order.   Pt remained free of falls this shift.   Plan of care reviewed. Patient verbalizes understanding.   Pt moving/turning independently.   Bed in lowest position, upper side side rails up x 2, wheels locked  Call light in reach, bed alarm on.   Patient instructed to call staff for mobility/assistance.  Nonskid socks on when out of bed.  Patient education provided.  No further concerns voiced at this time.    Problem: Infection  Goal: Absence of Infection Signs and Symptoms  Outcome: Progressing     Problem: Skin Injury Risk Increased  Goal: Skin Health and Integrity  Outcome: Progressing     Problem: Adult Inpatient Plan of Care  Goal: Plan of Care Review  Outcome: Progressing  Goal: Patient-Specific Goal (Individualized)  Outcome: Progressing  Goal: Absence of Hospital-Acquired Illness or Injury  Outcome: Progressing  Goal: Optimal Comfort and Wellbeing  Outcome: Progressing  Goal: Readiness for Transition of Care  Outcome: Progressing     Problem: Fall Injury Risk  Goal: Absence of Fall and Fall-Related Injury  Outcome: Progressing     Problem: Wound  Goal: Optimal Coping  Outcome: Progressing  Goal: Optimal Functional Ability  Outcome: Progressing  Goal: Absence of Infection Signs and Symptoms  Outcome: Progressing  Goal: Improved Oral Intake  Outcome: Progressing  Goal: Optimal Pain Control and Function  Outcome: Progressing  Goal: Skin Health and Integrity  Outcome: Progressing  Goal: Optimal Wound Healing  Outcome: Progressing

## 2024-12-01 NOTE — PROGRESS NOTES
[facility] Medicine  Progress Note    Patient Name: Abigail Babb  MRN: [id number]  Patient Class: IP- Inpatient   Admission Date: 11/29/2024  Length of Stay: 2 days  Attending Physician: Sanjay Schulz MD  Primary Care Provider: Faraz Nguyễn MD    Subjective:     Principal Problem:Acute on chronic congestive heart failure        HPI:  Abigail Babb is a 79 year old female with comorbid conditions of COPD, CHF, COPD and CAD who presented to ED for further evaluation of respiratory distress and hypoxia that began around 0400 this morning. Patient woke up very dyspneic and was not able to catch her breath. Per EMS, patient oxygen was 68 requiring NPPV via BIPAP. She does not wear supplemental oxygen. Patient is followed by Dr. Gama for CAD, CHF, and valvular disease. She does not take Lasix regularly due to CKD. She went to [place] yesterday for Thanksgiving dinner and did not restrict her sodium intake. She denies any symptoms prior to going to bed.     Imaging in ED consistent with CHF, and patient received 60mg IV Lasix. She has been weaned from Bipap to 3L nasal cannula. Elevated troponin and BNP noted.HM consulted for admission.     Overview/Hospital Course:  Abigail Babb is a 79 year old female who presented to ED with respiratory distress and hypoxia. Work up in ED consistent with decompensated heart failure. She required NIPPV via BIPAP and was aggressively diuresed resulting in reduction of oxygen requirements. EKG and echocardiogram with findings of left bundle branch block. Troponin elevated consistent with NSTEMI. Left bundle branch block has now resolved with treatment. Cardiology will discuss need for LHC.     12/1/24  Responding well to diuretics. Now on 3L NC. Blood pressure uncontrolled amlodipine added for optimization. Plans for LHC tomorrow morning. Contrast induced nephropathy discussed at length. Patient would like to proceed. Noted left upper  extremity cellulitis from dog scratch. Will be treated with topical and PO abx.     Interval History: breathing is better. Nervous about LHC tomorrow. Understands risk of contrast induced nephropathy. Wean Lasix.    Review of Systems   Constitutional:  Negative for activity change, diaphoresis, fatigue and unexpected weight change.   HENT:  Negative for congestion, ear pain and sore throat.    Eyes: Negative.    Respiratory:  Positive for shortness of breath (improving). Negative for wheezing.    Cardiovascular:  Negative for chest pain and palpitations.   Gastrointestinal:  Negative for abdominal pain, constipation, diarrhea and vomiting.   Endocrine: Negative.    Genitourinary:  Negative for flank pain, hematuria and urgency.   Musculoskeletal:  Negative for joint swelling and neck pain.   Skin:  Positive for color change (erythema). Negative for pallor.   Neurological:  Negative for seizures, syncope and light-headedness.   Hematological: Negative.    Psychiatric/Behavioral: Negative.       Objective:     Vital Signs (Most Recent):  Temp: 98.6 °F (37 °C) (12/01/24 1225)  Pulse: 62 (12/01/24 1225)  Resp: 18 (12/01/24 1225)  BP: (!) 161/68 (12/01/24 1225)  SpO2: (!) 93 % (12/01/24 1225) Vital Signs (24h Range):  Temp:  [97.6 °F (36.4 °C)-98.6 °F (37 °C)] 98.6 °F (37 °C)  Pulse:  [52-63] 62  Resp:  [16-20] 18  SpO2:  [93 %-100 %] 93 %  BP: (140-184)/(65-77) 161/68     Weight: 84.4 kg (186 lb 1.1 oz)  Body mass index is 32.96 kg/m².    Intake/Output Summary (Last 24 hours) at 12/1/2024 1254  Last data filed at 12/1/2024 0830  Gross per 24 hour   Intake 480 ml   Output 2000 ml   Net -1520 ml         Physical Exam  Constitutional:       Appearance: She is well-developed.   HENT:      Head: Normocephalic and atraumatic.   Cardiovascular:      Rate and Rhythm: Normal rate and regular rhythm.      Heart sounds: Normal heart sounds. No murmur heard.  Pulmonary:      Effort: No respiratory distress (improved).       Comments: Nasal cannula in place  Abdominal:      General: Bowel sounds are normal. There is no distension.      Palpations: Abdomen is soft.      Tenderness: There is no abdominal tenderness.   Musculoskeletal:         General: Normal range of motion.      Cervical back: Normal range of motion and neck supple.   Skin:     General: Skin is warm and dry.      Comments: Left forearm erythema-area marked   Neurological:      Mental Status: She is alert and oriented to person, place, and time.             Significant Labs: All pertinent labs within the past 24 hours have been reviewed.  CBC:   Recent Labs   Lab 11/30/24 0447 12/01/24  0636   WBC 8.25 7.81   HGB 9.1* 9.2*   HCT 27.3* 28.3*    249     CMP:   Recent Labs   Lab 11/30/24 0447 12/01/24  0636    142   K 3.8 3.5    101   CO2 25 30*   * 95   BUN 43* 45*   CREATININE 2.0* 1.8*   CALCIUM 9.8 9.8   ANIONGAP 12 11       Significant Imaging: I have reviewed all pertinent imaging results/findings within the past 24 hours.    Assessment/Plan:      * Acute on chronic congestive heart failure  Presents with acute dyspnea and respiratory distress initially requiring NIPPV via BIPAP. CXR consistent with CHF and diuresed with positive response. Patient has been weaned to 3L nasal cannula. She does not take Lasix regularly due to kidney function.     Patient has  unknown type  heart failure that is Acute on chronic. On presentation their CHF was decompensated. Evidence of decompensated CHF on presentation includes: crackles on lung auscultation, paroxysmal nocturnal dyspnea (PND), dyspnea on exertion (CHANCE), and shortness of breath. The etiology of their decompensation is likely dietary indiscretion. Most recent BNP and echo results are listed below.  Recent Labs     11/29/24  0635   BNP 1,010*       Latest ECHO  Results for orders placed during the hospital encounter of 11/29/24    Echo Saline Bubble? No    Interpretation Summary    Left  Ventricle: The left ventricle is normal in size. Normal wall thickness. There is concentric hypertrophy. Regional wall motion abnormalities present. Septal motion is consistent with bundle branch block. There is normal systolic function with a visually estimated ejection fraction of 55 - 60%. Ejection fraction is approximately 55%. Grade II diastolic dysfunction.    Right Ventricle: Normal right ventricular cavity size. Wall thickness is normal. Systolic function is normal.    Left Atrium: Left atrium is severely dilated.    Aortic Valve: There is mild stenosis. Aortic valve area by VTI is 1.5 cm². Aortic valve peak velocity is 3.0 m/s. Mean gradient is 21.5 mmHg. The dimensionless index is 0.40. There is mild aortic regurgitation.    Mitral Valve: There is mild to moderate regurgitation.    IVC/SVC: Intermediate venous pressure at 8 mmHg.    Current Heart Failure Medications  , ,   , , Oral  , , Oral  , , Oral  carvediloL tablet 25 mg, 2 times daily, Oral  furosemide tablet 40 mg, Daily, Oral    Plan  - Monitor strict I&Os and daily weights.    - Place on telemetry  - Low sodium diet  - Place on fluid restriction of 1.5 L.   - Cardiology has been consulted  - The patient's volume status is uncontrolled  -follow Echocardiogram results, diurese    11/30/24  Echocardiogram showed preserved EF, DD, and septal wall motion consistent with LBBB.  Patient is responding well to diuresis. Has diuresed 2.9L    Management as above    12/1/24  Responding well. Output 4.4L. repeat CXR pending. Reduce Lasix to daily    Nonrheumatic aortic valve stenosis  Echocardiogram with evidence of aortic stenosis that is mild . The patient's most recent echocardiogram result is listed below. We will manage the valvular abnormality by diuresis    Echo Saline Bubble? No    Result Date: 11/29/2024    Left Ventricle: The left ventricle is normal in size. Normal wall   thickness. There is concentric hypertrophy. Regional wall motion    abnormalities present. Septal motion is consistent with bundle branch   block. There is normal systolic function with a visually estimated   ejection fraction of 55 - 60%. Ejection fraction is approximately 55%.   Grade II diastolic dysfunction.    Right Ventricle: Normal right ventricular cavity size. Wall thickness   is normal. Systolic function is normal.    Left Atrium: Left atrium is severely dilated.    Aortic Valve: There is mild stenosis. Aortic valve area by VTI is 1.5   cm². Aortic valve peak velocity is 3.0 m/s. Mean gradient is 21.5 mmHg.   The dimensionless index is 0.40. There is mild aortic regurgitation.    Mitral Valve: There is mild to moderate regurgitation.    IVC/SVC: Intermediate venous pressure at 8 mmHg.          Anemia of chronic disease  Anemia is likely due to chronic disease due to Chronic Kidney Disease. Most recent hemoglobin and hematocrit are listed below.  Recent Labs     11/29/24  0635 11/30/24  0447 12/01/24  0636   HGB 9.6* 9.1* 9.2*   HCT 30.3* 27.3* 28.3*       Plan  - Monitor serial CBC: Daily  - Transfuse PRBC if patient becomes hemodynamically unstable, symptomatic or H/H drops below 7/21.  - Patient has not received any PRBC transfusions to date  - Patient's anemia is currently stable      COPD (chronic obstructive pulmonary disease)  Patient's COPD is controlled currently.  Patient is currently off COPD Pathway. Continue scheduled inhalers Steroids and Supplemental oxygen and monitor respiratory status closely.     CKD (chronic kidney disease), stage III  Creatine stable for now. BMP reviewed- noted Estimated Creatinine Clearance: 26.9 mL/min (A) (based on SCr of 1.8 mg/dL (H)). according to latest data. Based on current GFR, CKD stage is stage 3 - GFR 30-59.    --Patient is followed by Dr. Hair  --Hold Farxiga now for proteinuria   --Kidney function at baseline.   --Monitor UOP and serial BMP and adjust therapy as needed.   --Renally dose meds. Avoid nephrotoxic  medications and procedures.    11/30/24  Kidney function stable. Diurese    12/1/24  Kidney function improving.   Patient understands risk of contrast induced nephropathy with [facility]    Breast cancer  Left mastectomy with lumph node dissection in 6/2023  --takes Ibrance and Faslodex injection.   --Followed by Dr. Breaux    Primary hypertension  Patients blood pressure range in the last 24 hours was: BP  Min: 122/58  Max: 187/76.The patient's inpatient anti-hypertensive regimen is listed below:  Current Antihypertensives  nitroGLYCERIN 2% TD oint ointment 1 inch, Every 8 hours, Topical (Top)  , ,   , , Oral  , , Oral  furosemide injection 40 mg, Every 12 hours, Intravenous  spironolactone tablet 25 mg, Daily, Oral    Plan  - BP is controlled, no changes needed to their regimen      LBBB (left bundle branch block)  Noted on EKG  --patient has a history of CAD  --followed by Dr. Gama  Cardiology consulted    11/30/23  Also noted on Echocardiogram  Repeat today showed resolution of LBBB after diuresis  Cardiology will decide on need for [facility]      CAD (coronary artery disease)  Patient with known CAD s/p stent placement. Will continue Statin and monitor for S/Sx of angina/ACS. Continue to monitor on telemetry.   --patient is followed by Dr. Brant Lundberg  --S/p PCI 10/2023    11/30/23   See plan for NSTEMI    NSTEMI (non-ST elevated myocardial infarction)  Recent Labs     11/29/24  0903 11/29/24  2104 11/30/24  0447   TROPONINI 1.001* 2.224* 1.635*     ?demand ischemia from CHF versus concern for cardiac ischemia  Follow trends  Echocardiogram pending  Heparin infusion initiated in ED  Cardiology input appreciated    11/30/23  Troponin trend consistent with NSTEM  Continue heparin infusion.   Cardiology will discuss need for heparin infusion  Continue ASA, Plavix, BB, and STATIN    12/1/24  Plans for [facility] in AM    Acute respiratory failure with hypoxia  Patient with Hypoxic Respiratory failure which is Acute.  she  is not on home oxygen. Supplemental oxygen was provided and noted-      .   Signs/symptoms of respiratory failure include- tachypnea, increased work of breathing, respiratory distress, and use of accessory muscles. Contributing diagnoses includes - CHF Labs and images were reviewed. Patient Has recent ABG, which has been reviewed. Will treat underlying causes and adjust management of respiratory failure as follows-   --Treat underlying CHF    12/1/24  Weaning. On 3L nasal cannula    Left Arm Cellulitis  Related to dog injury  Topical mupirocin  Doxycycline.     VTE Risk Mitigation (From admission, onward)           Ordered     IP VTE HIGH RISK PATIENT  Once         11/29/24 1159     Place sequential compression device  Until discontinued         11/29/24 1159     heparin 25,000 units in dextrose 5% (100 units/ml) IV bolus from bag LOW INTENSITY nomogram - [facility]  As needed (PRN)        Question:  Heparin Infusion Adjustment (DO NOT MODIFY ANSWER)  Answer:  \\[email or web address]\epic\Images\Pharmacy\HeparinInfusions\heparin LOW INTENSITY nomogram for [facility] [id number]    11/29/24 0725     heparin 25,000 units in dextrose 5% (100 units/ml) IV bolus from bag LOW INTENSITY nomogram - [facility]  As needed (PRN)        Question:  Heparin Infusion Adjustment (DO NOT MODIFY ANSWER)  Answer:  \\[email or web address]\epic\Images\Pharmacy\HeparinInfusions\heparin LOW INTENSITY nomogram for [facility] [id number].pdf    11/29/24 0725     heparin 25,000 units in dextrose 5% 250 mL (100 units/mL) infusion LOW INTENSITY nomogram - [facility]  Continuous        Question:  Begin at (units/kg/hr)  Answer:  12    11/29/24 0725                    Discharge Planning   JANEEN:      Code Status: Full Code   Is the patient medically ready for discharge?:     Reason for patient still in hospital (select all that apply): Treatment  Discharge Plan A: Home with family            Pedro Pablo Mclaughlin NP  Department of Hospital Medicine   [facility] - Tele[facility] ([facility])

## 2024-12-01 NOTE — ASSESSMENT & PLAN NOTE
Creatine stable for now. BMP reviewed- noted Estimated Creatinine Clearance: 26.9 mL/min (A) (based on SCr of 1.8 mg/dL (H)). according to latest data. Based on current GFR, CKD stage is stage 3 - GFR 30-59.    --Patient is followed by Dr. Hair  --Hold Farxiga now for proteinuria   --Kidney function at baseline.   --Monitor UOP and serial BMP and adjust therapy as needed.   --Renally dose meds. Avoid nephrotoxic medications and procedures.    11/30/24  Kidney function stable. Diurese    12/1/24  Kidney function improving.   Patient understands risk of contrast induced nephropathy with [facility]

## 2024-12-01 NOTE — ASSESSMENT & PLAN NOTE
-Initial troponin 0.092, repeat pending  -Elevation likely secondary to demand ischemia from hypoxia, decompensated CHF, COPD  -Continue heparin gtt, ASA, BB, statin, Imdur  -Reports [facility] 10/23 with PCI of   -TTE pending    11/30/2024  Had labs suggestive of nstemi   Has known 3 vessel cad need to discuss coronary angiography despite risk of contrast nephropathy    12/1/2024  Will plan on coronary angiography and rhc in am pending renal function in am.   Discussed contrast nephropathy

## 2024-12-02 LAB
ANION GAP SERPL CALC-SCNC: 13 MMOL/L (ref 8–16)
APTT PPP: 55.3 SEC (ref 21–32)
BASOPHILS # BLD AUTO: 0.04 K/UL (ref 0–0.2)
BASOPHILS NFR BLD: 0.8 % (ref 0–1.9)
BUN SERPL-MCNC: 42 MG/DL (ref 8–23)
CALCIUM SERPL-MCNC: 9.6 MG/DL (ref 8.7–10.5)
CHLORIDE SERPL-SCNC: 99 MMOL/L (ref 95–110)
CO2 SERPL-SCNC: 27 MMOL/L (ref 23–29)
CREAT SERPL-MCNC: 1.6 MG/DL (ref 0.5–1.4)
DIFFERENTIAL METHOD BLD: ABNORMAL
EOSINOPHIL # BLD AUTO: 0.1 K/UL (ref 0–0.5)
EOSINOPHIL NFR BLD: 2.5 % (ref 0–8)
ERYTHROCYTE [DISTWIDTH] IN BLOOD BY AUTOMATED COUNT: 15.9 % (ref 11.5–14.5)
EST. GFR  (NO RACE VARIABLE): 33 ML/MIN/1.73 M^2
GLUCOSE SERPL-MCNC: 91 MG/DL (ref 70–110)
HCT VFR BLD AUTO: 29.5 % (ref 37–48.5)
HGB BLD-MCNC: 9.5 G/DL (ref 12–16)
IMM GRANULOCYTES # BLD AUTO: 0.04 K/UL (ref 0–0.04)
IMM GRANULOCYTES NFR BLD AUTO: 0.8 % (ref 0–0.5)
LYMPHOCYTES # BLD AUTO: 1 K/UL (ref 1–4.8)
LYMPHOCYTES NFR BLD: 20.3 % (ref 18–48)
MAGNESIUM SERPL-MCNC: 1.8 MG/DL (ref 1.6–2.6)
MCH RBC QN AUTO: 34.4 PG (ref 27–31)
MCHC RBC AUTO-ENTMCNC: 32.2 G/DL (ref 32–36)
MCV RBC AUTO: 107 FL (ref 82–98)
MONOCYTES # BLD AUTO: 0.5 K/UL (ref 0.3–1)
MONOCYTES NFR BLD: 11.2 % (ref 4–15)
NEUTROPHILS # BLD AUTO: 3.1 K/UL (ref 1.8–7.7)
NEUTROPHILS NFR BLD: 64.4 % (ref 38–73)
NRBC BLD-RTO: 0 /100 WBC
PLATELET # BLD AUTO: 243 K/UL (ref 150–450)
PMV BLD AUTO: 11.1 FL (ref 9.2–12.9)
POTASSIUM SERPL-SCNC: 3.8 MMOL/L (ref 3.5–5.1)
RBC # BLD AUTO: 2.76 M/UL (ref 4–5.4)
SODIUM SERPL-SCNC: 139 MMOL/L (ref 136–145)
WBC # BLD AUTO: 4.82 K/UL (ref 3.9–12.7)

## 2024-12-02 PROCEDURE — 63600175 PHARM REV CODE 636 W HCPCS: Performed by: INTERNAL MEDICINE

## 2024-12-02 PROCEDURE — 27000221 HC OXYGEN, UP TO 24 HOURS

## 2024-12-02 PROCEDURE — 94640 AIRWAY INHALATION TREATMENT: CPT

## 2024-12-02 PROCEDURE — 99152 MOD SED SAME PHYS/QHP 5/>YRS: CPT | Mod: ,,, | Performed by: INTERNAL MEDICINE

## 2024-12-02 PROCEDURE — 21400001 HC TELEMETRY ROOM

## 2024-12-02 PROCEDURE — 25500020 PHARM REV CODE 255: Performed by: INTERNAL MEDICINE

## 2024-12-02 PROCEDURE — 25000242 PHARM REV CODE 250 ALT 637 W/ HCPCS: Performed by: INTERNAL MEDICINE

## 2024-12-02 PROCEDURE — 25000003 PHARM REV CODE 250: Performed by: INTERNAL MEDICINE

## 2024-12-02 PROCEDURE — 25000003 PHARM REV CODE 250

## 2024-12-02 PROCEDURE — 99232 SBSQ HOSP IP/OBS MODERATE 35: CPT | Mod: 25,,,

## 2024-12-02 PROCEDURE — 85025 COMPLETE CBC W/AUTO DIFF WBC: CPT | Performed by: EMERGENCY MEDICINE

## 2024-12-02 PROCEDURE — C1769 GUIDE WIRE: HCPCS | Performed by: INTERNAL MEDICINE

## 2024-12-02 PROCEDURE — C1887 CATHETER, GUIDING: HCPCS | Performed by: INTERNAL MEDICINE

## 2024-12-02 PROCEDURE — 94761 N-INVAS EAR/PLS OXIMETRY MLT: CPT

## 2024-12-02 PROCEDURE — 99900035 HC TECH TIME PER 15 MIN (STAT)

## 2024-12-02 PROCEDURE — 83735 ASSAY OF MAGNESIUM: CPT | Performed by: NURSE PRACTITIONER

## 2024-12-02 PROCEDURE — 99153 MOD SED SAME PHYS/QHP EA: CPT | Performed by: INTERNAL MEDICINE

## 2024-12-02 PROCEDURE — 99152 MOD SED SAME PHYS/QHP 5/>YRS: CPT | Performed by: INTERNAL MEDICINE

## 2024-12-02 PROCEDURE — 93460 R&L HRT ART/VENTRICLE ANGIO: CPT | Performed by: INTERNAL MEDICINE

## 2024-12-02 PROCEDURE — C1751 CATH, INF, PER/CENT/MIDLINE: HCPCS | Performed by: INTERNAL MEDICINE

## 2024-12-02 PROCEDURE — 85730 THROMBOPLASTIN TIME PARTIAL: CPT | Performed by: EMERGENCY MEDICINE

## 2024-12-02 PROCEDURE — 93460 R&L HRT ART/VENTRICLE ANGIO: CPT | Mod: 26,,, | Performed by: INTERNAL MEDICINE

## 2024-12-02 PROCEDURE — 36415 COLL VENOUS BLD VENIPUNCTURE: CPT | Performed by: NURSE PRACTITIONER

## 2024-12-02 PROCEDURE — C1894 INTRO/SHEATH, NON-LASER: HCPCS | Performed by: INTERNAL MEDICINE

## 2024-12-02 PROCEDURE — 25000003 PHARM REV CODE 250: Performed by: NURSE PRACTITIONER

## 2024-12-02 PROCEDURE — 4A023N8 MEASUREMENT OF CARDIAC SAMPLING AND PRESSURE, BILATERAL, PERCUTANEOUS APPROACH: ICD-10-PCS | Performed by: INTERNAL MEDICINE

## 2024-12-02 PROCEDURE — B2111ZZ FLUOROSCOPY OF MULTIPLE CORONARY ARTERIES USING LOW OSMOLAR CONTRAST: ICD-10-PCS | Performed by: INTERNAL MEDICINE

## 2024-12-02 PROCEDURE — 80048 BASIC METABOLIC PNL TOTAL CA: CPT | Performed by: NURSE PRACTITIONER

## 2024-12-02 PROCEDURE — 25000242 PHARM REV CODE 250 ALT 637 W/ HCPCS: Performed by: NURSE PRACTITIONER

## 2024-12-02 RX ORDER — ACETAMINOPHEN 325 MG/1
650 TABLET ORAL EVERY 4 HOURS PRN
Status: DISCONTINUED | OUTPATIENT
Start: 2024-12-02 | End: 2024-12-03 | Stop reason: HOSPADM

## 2024-12-02 RX ORDER — HEPARIN SODIUM 1000 [USP'U]/ML
INJECTION, SOLUTION INTRAVENOUS; SUBCUTANEOUS
Status: DISCONTINUED | OUTPATIENT
Start: 2024-12-02 | End: 2024-12-02 | Stop reason: HOSPADM

## 2024-12-02 RX ORDER — MIDAZOLAM HYDROCHLORIDE 1 MG/ML
INJECTION INTRAMUSCULAR; INTRAVENOUS
Status: DISCONTINUED | OUTPATIENT
Start: 2024-12-02 | End: 2024-12-02 | Stop reason: HOSPADM

## 2024-12-02 RX ORDER — SODIUM CHLORIDE 9 MG/ML
INJECTION, SOLUTION INTRAVENOUS CONTINUOUS
Status: DISCONTINUED | OUTPATIENT
Start: 2024-12-02 | End: 2024-12-02

## 2024-12-02 RX ORDER — DIPHENHYDRAMINE HYDROCHLORIDE 50 MG/ML
INJECTION INTRAMUSCULAR; INTRAVENOUS
Status: DISCONTINUED | OUTPATIENT
Start: 2024-12-02 | End: 2024-12-02 | Stop reason: HOSPADM

## 2024-12-02 RX ORDER — SODIUM CHLORIDE 9 MG/ML
INJECTION, SOLUTION INTRAVENOUS CONTINUOUS
Status: ACTIVE | OUTPATIENT
Start: 2024-12-02 | End: 2024-12-03

## 2024-12-02 RX ORDER — ONDANSETRON 8 MG/1
8 TABLET, ORALLY DISINTEGRATING ORAL EVERY 8 HOURS PRN
Status: DISCONTINUED | OUTPATIENT
Start: 2024-12-02 | End: 2024-12-03 | Stop reason: HOSPADM

## 2024-12-02 RX ORDER — NITROGLYCERIN 5 MG/ML
INJECTION, SOLUTION INTRAVENOUS
Status: DISCONTINUED | OUTPATIENT
Start: 2024-12-02 | End: 2024-12-02 | Stop reason: HOSPADM

## 2024-12-02 RX ORDER — LIDOCAINE HYDROCHLORIDE 20 MG/ML
INJECTION, SOLUTION EPIDURAL; INFILTRATION; INTRACAUDAL; PERINEURAL
Status: DISCONTINUED | OUTPATIENT
Start: 2024-12-02 | End: 2024-12-02 | Stop reason: HOSPADM

## 2024-12-02 RX ORDER — FENTANYL CITRATE 50 UG/ML
INJECTION, SOLUTION INTRAMUSCULAR; INTRAVENOUS
Status: DISCONTINUED | OUTPATIENT
Start: 2024-12-02 | End: 2024-12-02 | Stop reason: HOSPADM

## 2024-12-02 RX ADMIN — BUDESONIDE INHALATION 0.5 MG: 0.5 SUSPENSION RESPIRATORY (INHALATION) at 09:12

## 2024-12-02 RX ADMIN — SODIUM CHLORIDE: 9 INJECTION, SOLUTION INTRAVENOUS at 09:12

## 2024-12-02 RX ADMIN — PANTOPRAZOLE SODIUM 40 MG: 40 TABLET, DELAYED RELEASE ORAL at 08:12

## 2024-12-02 RX ADMIN — AMLODIPINE BESYLATE 5 MG: 5 TABLET ORAL at 08:12

## 2024-12-02 RX ADMIN — CETIRIZINE HYDROCHLORIDE 5 MG: 5 TABLET ORAL at 08:12

## 2024-12-02 RX ADMIN — BUDESONIDE INHALATION 0.5 MG: 0.5 SUSPENSION RESPIRATORY (INHALATION) at 08:12

## 2024-12-02 RX ADMIN — GABAPENTIN 100 MG: 100 CAPSULE ORAL at 08:12

## 2024-12-02 RX ADMIN — ASPIRIN 81 MG CHEWABLE TABLET 81 MG: 81 TABLET CHEWABLE at 08:12

## 2024-12-02 RX ADMIN — IPRATROPIUM BROMIDE AND ALBUTEROL SULFATE 3 ML: 2.5; .5 SOLUTION RESPIRATORY (INHALATION) at 09:12

## 2024-12-02 RX ADMIN — CARVEDILOL 25 MG: 12.5 TABLET, FILM COATED ORAL at 08:12

## 2024-12-02 RX ADMIN — ARFORMOTEROL TARTRATE 15 MCG: 15 SOLUTION RESPIRATORY (INHALATION) at 08:12

## 2024-12-02 RX ADMIN — DOXYCYCLINE HYCLATE 100 MG: 100 TABLET, COATED ORAL at 08:12

## 2024-12-02 RX ADMIN — ARFORMOTEROL TARTRATE 15 MCG: 15 SOLUTION RESPIRATORY (INHALATION) at 09:12

## 2024-12-02 RX ADMIN — FUROSEMIDE 40 MG: 40 TABLET ORAL at 08:12

## 2024-12-02 RX ADMIN — BENZONATATE 100 MG: 100 CAPSULE ORAL at 10:12

## 2024-12-02 RX ADMIN — ATORVASTATIN CALCIUM 40 MG: 40 TABLET, FILM COATED ORAL at 08:12

## 2024-12-02 RX ADMIN — SODIUM CHLORIDE: 9 INJECTION, SOLUTION INTRAVENOUS at 08:12

## 2024-12-02 RX ADMIN — MUPIROCIN: 20 OINTMENT TOPICAL at 08:12

## 2024-12-02 RX ADMIN — ISOSORBIDE MONONITRATE 30 MG: 30 TABLET, EXTENDED RELEASE ORAL at 08:12

## 2024-12-02 RX ADMIN — ALPRAZOLAM 0.5 MG: 0.5 TABLET ORAL at 10:12

## 2024-12-02 NOTE — SUBJECTIVE & OBJECTIVE
Review of Systems   Constitutional: Negative.   HENT: Negative.     Eyes: Negative.    Cardiovascular: Negative.    Respiratory: Negative.     Skin: Negative.    Musculoskeletal: Negative.    Gastrointestinal: Negative.    Genitourinary: Negative.    Neurological: Negative.    Psychiatric/Behavioral: Negative.       Objective:     Vital Signs (Most Recent):  Temp: 98 °F (36.7 °C) (12/02/24 1144)  Pulse: 70 (12/02/24 1300)  Resp: 20 (12/02/24 1144)  BP: (!) 147/63 (12/02/24 1144)  SpO2: (!) 90 % (12/02/24 1144) Vital Signs (24h Range):  Temp:  [97.2 °F (36.2 °C)-98.5 °F (36.9 °C)] 98 °F (36.7 °C)  Pulse:  [54-71] 70  Resp:  [18-20] 20  SpO2:  [90 %-94 %] 90 %  BP: (143-172)/(63-81) 147/63     Weight: 77.7 kg (171 lb 6.5 oz)  Body mass index is 30.36 kg/m².     SpO2: (!) 90 %         Intake/Output Summary (Last 24 hours) at 12/2/2024 1524  Last data filed at 12/1/2024 2357  Gross per 24 hour   Intake --   Output 0 ml   Net 0 ml       Lines/Drains/Airways       Drain  Duration             Female External Urinary Catheter w/ Suction 11/29/24 2224 2 days              Peripheral Intravenous Line  Duration                  Peripheral IV - Single Lumen 11/29/24 0742 20 G Right Antecubital 3 days                       Physical Exam  Vitals and nursing note reviewed.   Constitutional:       Appearance: Normal appearance.   HENT:      Head: Normocephalic.   Eyes:      Pupils: Pupils are equal, round, and reactive to light.   Cardiovascular:      Rate and Rhythm: Normal rate and regular rhythm.      Heart sounds: Normal heart sounds, S1 normal and S2 normal. No murmur heard.     No S3 or S4 sounds.   Pulmonary:      Effort: Pulmonary effort is normal.      Breath sounds: Normal breath sounds.   Abdominal:      General: Bowel sounds are normal.      Palpations: Abdomen is soft.   Musculoskeletal:         General: Normal range of motion.      Cervical back: Normal range of motion.   Skin:     Capillary Refill: Capillary  "refill takes less than 2 seconds.   Neurological:      General: No focal deficit present.      Mental Status: She is alert and oriented to person, place, and time.   Psychiatric:         Mood and Affect: Mood normal.         Behavior: Behavior normal.         Thought Content: Thought content normal.            Significant Labs: BMP:   Recent Labs   Lab 12/01/24  0636 12/02/24  0549   GLU 95 91    139   K 3.5 3.8    99   CO2 30* 27   BUN 45* 42*   CREATININE 1.8* 1.6*   CALCIUM 9.8 9.6   MG  --  1.8   , CMP   Recent Labs   Lab 12/01/24  0636 12/02/24  0549    139   K 3.5 3.8    99   CO2 30* 27   GLU 95 91   BUN 45* 42*   CREATININE 1.8* 1.6*   CALCIUM 9.8 9.6   ANIONGAP 11 13   , CBC   Recent Labs   Lab 12/01/24  0636 12/02/24  0549   WBC 7.81 4.82   HGB 9.2* 9.5*   HCT 28.3* 29.5*    243   , INR No results for input(s): "INR", "PROTIME" in the last 48 hours., Lipid Panel No results for input(s): "CHOL", "HDL", "LDLCALC", "TRIG", "CHOLHDL" in the last 48 hours., Troponin No results for input(s): "TROPONINI" in the last 48 hours., and All pertinent lab results from the last 24 hours have been reviewed.    Significant Imaging: Cardiac Cath: reviewed, Echocardiogram: Transthoracic echo (TTE) complete (Cupid Only):   Results for orders placed or performed during the hospital encounter of 11/29/24   Echo Saline Bubble? No   Result Value Ref Range    BSA 2.02 m2    LVOT stroke volume 123.9 cm3    LVIDd 5.0 3.5 - 6.0 cm    LV Systolic Volume 49.23 mL    LV Systolic Volume Index 25.4 mL/m2    LVIDs 3.5 2.1 - 4.0 cm    LV ESV A2C 73.84 mL    LV Diastolic Volume 117.96 mL    LV ESV A4C 74.49 mL    LV Diastolic Volume Index 60.80 mL/m2    Left Ventricular End Systolic Volume by Teichholz Method 49.23 mL    Left Ventricular End Diastolic Volume by Teichholz Method 117.96 mL    IVS 1.4 (A) 0.6 - 1.1 cm    LVOT diameter 2.2 cm    LVOT area 3.8 cm2    FS 30.0 28 - 44 %    Left Ventricle Relative Wall " Thickness 0.60 cm    PW 1.5 (A) 0.6 - 1.1 cm    LV mass 306.8 g    LV Mass Index 158.1 g/m2    MV Peak E James 1.07 m/s    TDI LATERAL 0.07 m/s    TDI SEPTAL 0.04 m/s    E/E' ratio 19.45 m/s    MV Peak A James 1.47 m/s    E/A ratio 0.73     IVRT 85.63 msec    E wave deceleration time 193.31 msec    LV SEPTAL E/E' RATIO 26.75 m/s    LV LATERAL E/E' RATIO 15.29 m/s    LVOT peak james 1.2 m/s    Left Ventricular Outflow Tract Mean Velocity 0.77 cm/s    Left Ventricular Outflow Tract Mean Gradient 2.69 mmHg    RVOT peak VTI 27.3 cm    TAPSE 2.82 cm    LA size 3.94 cm    Left Atrium Minor Axis 5.65 cm    Left Atrium Major Axis 5.91 cm    LA Vol (MOD) 75.65 mL    SANTOSH (MOD) 39.0 mL/m2    RA Major Axis 5.21 cm    AV regurgitation pressure 1/2 time [id number] ms    AR Max James 3.83 m/s    AV mean gradient 21.5 mmHg    AV peak gradient 36.0 mmHg    Ao peak james 3.0 m/s    Ao VTI 82.4 cm    LVOT peak VTI 32.6 cm    AV valve area 1.5 cm²    AV Velocity Ratio 0.40     AV index (prosthetic) 0.40     NOA by Velocity Ratio 1.5 cm²    MV stenosis pressure 1/2 time 56.06 ms    MV valve area p 1/2 method 3.92 cm2    PV mean gradient 3 mmHg    PV PEAK VELOCITY 1.28 m/s    PV peak gradient 7 mmHg    RVOT peak james 1.18 m/s    Ao root annulus 3.17 cm    STJ 2.89 cm    Ascending aorta 2.45 cm    IVC diameter 2.24 cm    Mean e' 0.06 m/s    ZLVIDS 0.25     ZLVIDD -0.97     LA area A4C 23.18 cm2    LA area A2C 22.91 cm2    RVDD 3.01 cm    SANTOSH 51.9 mL/m2    LA Vol 100.61 cm3    LA WIDTH 5.2 cm    RA Width 2.8 cm    EF 55 %    Est. RA pres 8 mmHg    Narrative      Left Ventricle: The left ventricle is normal in size. Normal wall   thickness. There is concentric hypertrophy. Regional wall motion   abnormalities present. Septal motion is consistent with bundle branch   block. There is normal systolic function with a visually estimated   ejection fraction of 55 - 60%. Ejection fraction is approximately 55%.   Grade II diastolic dysfunction.     Right Ventricle: Normal right ventricular cavity size. Wall thickness   is normal. Systolic function is normal.    Left Atrium: Left atrium is severely dilated.    Aortic Valve: There is mild stenosis. Aortic valve area by VTI is 1.5   cm². Aortic valve peak velocity is 3.0 m/s. Mean gradient is 21.5 mmHg.   The dimensionless index is 0.40. There is mild aortic regurgitation.    Mitral Valve: There is mild to moderate regurgitation.    IVC/SVC: Intermediate venous pressure at 8 mmHg.     , EKG: reviewed, Stress Test: reviewed, and X-Ray: CXR: X-Ray Chest 1 View (CXR): No results found for this visit on 11/29/24.

## 2024-12-02 NOTE — ASSESSMENT & PLAN NOTE
Noted on EKG  --patient has a history of CAD  --followed by Dr. Gama  Cardiology consulted    11/30/23  Also noted on Echocardiogram  Repeat today showed resolution of LBBB after diuresis  Cardiology will decide on need for [facility]    12/2  Heart cath scheduled for 12/2.

## 2024-12-02 NOTE — ASSESSMENT & PLAN NOTE
Echocardiogram with evidence of aortic stenosis that is mild . The patient's most recent echocardiogram result is listed below. We will manage the valvular abnormality by diuresis    Echo Saline Bubble? No    Result Date: 11/29/2024    Left Ventricle: The left ventricle is normal in size. Normal wall   thickness. There is concentric hypertrophy. Regional wall motion   abnormalities present. Septal motion is consistent with bundle branch   block. There is normal systolic function with a visually estimated   ejection fraction of 55 - 60%. Ejection fraction is approximately 55%.   Grade II diastolic dysfunction.    Right Ventricle: Normal right ventricular cavity size. Wall thickness   is normal. Systolic function is normal.    Left Atrium: Left atrium is severely dilated.    Aortic Valve: There is mild stenosis. Aortic valve area by VTI is 1.5   cm². Aortic valve peak velocity is 3.0 m/s. Mean gradient is 21.5 mmHg.   The dimensionless index is 0.40. There is mild aortic regurgitation.    Mitral Valve: There is mild to moderate regurgitation.    IVC/SVC: Intermediate venous pressure at 8 mmHg.

## 2024-12-02 NOTE — PLAN OF CARE
Plan of care and orders reviewed with patient. Medications reviewed with patient. Safety measures inplace including bed locked in lowest position call light in reach and side rails for mobility. Chart and orders reviewed.     Problem: Infection  Goal: Absence of Infection Signs and Symptoms  Outcome: Progressing     Problem: Skin Injury Risk Increased  Goal: Skin Health and Integrity  Outcome: Progressing     Problem: Adult Inpatient Plan of Care  Goal: Plan of Care Review  Outcome: Progressing  Goal: Patient-Specific Goal (Individualized)  Outcome: Progressing  Goal: Absence of Hospital-Acquired Illness or Injury  Outcome: Progressing  Goal: Optimal Comfort and Wellbeing  Outcome: Progressing  Goal: Readiness for Transition of Care  Outcome: Progressing     Problem: Fall Injury Risk  Goal: Absence of Fall and Fall-Related Injury  Outcome: Progressing     Problem: Wound  Goal: Optimal Coping  Outcome: Progressing  Goal: Optimal Functional Ability  Outcome: Progressing  Goal: Absence of Infection Signs and Symptoms  Outcome: Progressing  Goal: Improved Oral Intake  Outcome: Progressing  Goal: Optimal Pain Control and Function  Outcome: Progressing  Goal: Skin Health and Integrity  Outcome: Progressing  Goal: Optimal Wound Healing  Outcome: Progressing

## 2024-12-02 NOTE — SUBJECTIVE & OBJECTIVE
Interval History: No acute events over night. Agrees to [facility] cath for today. Denies SOB, chest pains or any other c/o.    Review of Systems   Constitutional:  Negative for activity change.   HENT:  Negative for congestion.    Eyes:  Negative for visual disturbance.   Respiratory:  Negative for cough, chest tightness and shortness of breath.    Cardiovascular:  Negative for chest pain, palpitations and leg swelling.   Gastrointestinal:  Negative for abdominal distention, nausea and vomiting.   Genitourinary:  Negative for difficulty urinating.   Musculoskeletal:  Negative for arthralgias and myalgias.   Skin:  Negative for color change.   Neurological:  Negative for dizziness, syncope, weakness and light-headedness.     Objective:     Vital Signs (Most Recent):  Temp: 98 °F (36.7 °C) (12/02/24 1144)  Pulse: 70 (12/02/24 1144)  Resp: 20 (12/02/24 1144)  BP: (!) 147/63 (12/02/24 1144)  SpO2: (!) 90 % (12/02/24 1144) Vital Signs (24h Range):  Temp:  [97.2 °F (36.2 °C)-98.5 °F (36.9 °C)] 98 °F (36.7 °C)  Pulse:  [54-71] 70  Resp:  [18-20] 20  SpO2:  [90 %-94 %] 90 %  BP: (143-172)/(63-81) 147/63     Weight: 77.7 kg (171 lb 6.5 oz)  Body mass index is 30.36 kg/m².    Intake/Output Summary (Last 24 hours) at 12/2/2024 1240  Last data filed at 12/1/2024 2357  Gross per 24 hour   Intake 120 ml   Output 600 ml   Net -480 ml         Physical Exam  Vitals and nursing note reviewed.   Constitutional:       General: She is not in acute distress.     Appearance: She is obese.   HENT:      Mouth/Throat:      Mouth: Mucous membranes are moist.   Eyes:      Pupils: Pupils are equal, round, and reactive to light.   Cardiovascular:      Rate and Rhythm: Bradycardia present.      Pulses: Normal pulses.      Heart sounds: Murmur heard.   Pulmonary:      Effort: Pulmonary effort is normal. No respiratory distress.      Breath sounds: Normal breath sounds.   Musculoskeletal:      Right lower leg: No edema.      Left lower leg: No edema.    Skin:     General: Skin is warm and dry.      Capillary Refill: Capillary refill takes 2 to 3 seconds.   Neurological:      Mental Status: She is alert and oriented to person, place, and time.           Significant Labs: All pertinent labs within the past 24 hours have been reviewed.  BMP:   Recent Labs   Lab 12/02/24  0549   GLU 91      K 3.8   CL 99   CO2 27   BUN 42*   CREATININE 1.6*   CALCIUM 9.6   MG 1.8     CBC:   Recent Labs   Lab 12/01/24  0636 12/02/24  0549   WBC 7.81 4.82   HGB 9.2* 9.5*   HCT 28.3* 29.5*    243     Coagulation:   Recent Labs   Lab 12/02/24  0549   APTT 55.3*       Significant Imaging: I have reviewed all pertinent imaging results/findings within the past 24 hours.

## 2024-12-02 NOTE — ASSESSMENT & PLAN NOTE
Patient with Hypoxic Respiratory failure which is Acute.  she is not on home oxygen. Supplemental oxygen was provided and noted-      .   Signs/symptoms of respiratory failure include- tachypnea, increased work of breathing, respiratory distress, and use of accessory muscles. Contributing diagnoses includes - CHF Labs and images were reviewed. Patient Has recent ABG, which has been reviewed. Will treat underlying causes and adjust management of respiratory failure as follows-   --Treat underlying CHF    12/1/24  Weaning. On 3L nasal cannula    O2 saturations wnl.

## 2024-12-02 NOTE — ASSESSMENT & PLAN NOTE
-Initial troponin 0.092, repeat pending  -Elevation likely secondary to demand ischemia from hypoxia, decompensated CHF, COPD  -Continue heparin gtt, ASA, BB, statin, Imdur  -Reports [facility] 10/23 with PCI of   -TTE pending    11/30/2024  Had labs suggestive of nstemi   Has known 3 vessel cad need to discuss coronary angiography despite risk of contrast nephropathy    12/1/2024  Will plan on coronary angiography and rhc in am pending renal function in am.   Discussed contrast nephropathy    12/2/24  [facility] planend for today  All risks, benefits and treatment alternatives explained all questions answered pt agreeable to proceed  Monitor overnight after

## 2024-12-02 NOTE — ASSESSMENT & PLAN NOTE
"Presents with acute dyspnea and respiratory distress initially requiring NIPPV via BIPAP. CXR consistent with CHF and diuresed with positive response. Patient has been weaned to 3L nasal cannula. She does not take Lasix regularly due to kidney function.     Patient has  unknown type  heart failure that is Acute on chronic. On presentation their CHF was decompensated. Evidence of decompensated CHF on presentation includes: crackles on lung auscultation, paroxysmal nocturnal dyspnea (PND), dyspnea on exertion (CHANCE), and shortness of breath. The etiology of their decompensation is likely dietary indiscretion. Most recent BNP and echo results are listed below.  No results for input(s): "BNP" in the last 72 hours.    Latest ECHO  Results for orders placed during the hospital encounter of 11/29/24    Echo Saline Bubble? No    Interpretation Summary    Left Ventricle: The left ventricle is normal in size. Normal wall thickness. There is concentric hypertrophy. Regional wall motion abnormalities present. Septal motion is consistent with bundle branch block. There is normal systolic function with a visually estimated ejection fraction of 55 - 60%. Ejection fraction is approximately 55%. Grade II diastolic dysfunction.    Right Ventricle: Normal right ventricular cavity size. Wall thickness is normal. Systolic function is normal.    Left Atrium: Left atrium is severely dilated.    Aortic Valve: There is mild stenosis. Aortic valve area by VTI is 1.5 cm². Aortic valve peak velocity is 3.0 m/s. Mean gradient is 21.5 mmHg. The dimensionless index is 0.40. There is mild aortic regurgitation.    Mitral Valve: There is mild to moderate regurgitation.    IVC/SVC: Intermediate venous pressure at 8 mmHg.    Current Heart Failure Medications  , ,   , , Oral  , , Oral  , , Oral  carvediloL tablet 25 mg, 2 times daily, Oral  furosemide tablet 40 mg, Daily, Oral    Plan  - Monitor strict I&Os and daily weights.    - Place on telemetry  - " Low sodium diet  - Place on fluid restriction of 1.5 L.   - Cardiology has been consulted  - The patient's volume status is uncontrolled  -follow Echocardiogram results, diurese    11/30/24  Echocardiogram showed preserved EF, DD, and septal wall motion consistent with LBBB.  Patient is responding well to diuresis. Has diuresed 2.9L    Management as above    12/1/24  Responding well. Output 4.4L. repeat CXR pending. Reduce Lasix to daily    12/2  Heart cath scheduled for today. Continues with adequate UOP. Oxygen sats wnl.

## 2024-12-02 NOTE — ASSESSMENT & PLAN NOTE
Creatine stable for now. BMP reviewed- noted Estimated Creatinine Clearance: 29.1 mL/min (A) (based on SCr of 1.6 mg/dL (H)). according to latest data. Based on current GFR, CKD stage is stage 3 - GFR 30-59.    --Patient is followed by Dr. Hair  --Hold Farxiga now for proteinuria   --Kidney function at baseline.   --Monitor UOP and serial BMP and adjust therapy as needed.   --Renally dose meds. Avoid nephrotoxic medications and procedures.    11/30/24  Kidney function stable. Diurese    12/1/24  Kidney function improving.   Patient understands risk of contrast induced nephropathy with [facility]

## 2024-12-02 NOTE — ASSESSMENT & PLAN NOTE
Nephrology  Patient: Benjamin Ching Date:10/30/2021   : 1951 Attending: Latasha Potts MD   70 year old male        Chief complaint: ESRD    Initial Consult HPI:  This is a 70 year old male with a past medical history significant for end-stage renal disease on , diabetes mellitus, hypertension, presented to the emergency department with altered mental status and shortness of breath.  The patient did have increased of and breathing and he was brought to the ED from group home where he live.  The patient placed on 15 L of oxygen, no chest pain he was mostly nonverbal and unable to obtain detailed history from him.  Nephrology consulted for end-stage renal disease  At the ED he did test positive for COVID, electrolyte looks acceptable.    Subjective:   Stable breathing, no cp, No event overnight   On room air  Had hd done in room yest.      Past Medical History:   Diagnosis Date   • Acute bronchitis    • Cerebral infarction (CMS/HCC)    • Chronic anemia    • Chronic kidney disease    • Chronic kidney disease-mineral and bone disorder    • Diabetes mellitus (CMS/HCC)    • Esophageal reflux    • Essential (primary) hypertension    • Hemodialysis patient (CMS/HCC)    • Iron deficiency anemia    • Metabolic acidosis        Past Surgical History:   Procedure Laterality Date   • Achilles tendon surgery     • Appendectomy     • Cholecystectomy     • Colonoscopy diagnostic  2019    Affi, anemia, 3 TA, 3 yr   • Hemorrhoid surgery     • Tonsillectomy and adenoidectomy         Social History     Socioeconomic History   • Marital status: Single     Spouse name: Not on file   • Number of children: 0   • Years of education: Not on file   • Highest education level: Not on file   Occupational History   • Not on file   Tobacco Use   • Smoking status: Former Smoker     Packs/day: 0.75     Years: 43.00     Pack years: 32.25     Types: Cigarettes     Quit date: 2020     Years since quitting:  Renal function stable with diuresis will monitor   1.1   • Smokeless tobacco: Never Used   • Tobacco comment: pk yr per care everywhere   Vaping Use   • Vaping Use: never used   Substance and Sexual Activity   • Alcohol use: No     Comment: None since 2017   • Drug use: Not Currently     Types: Marijuana, Cocaine     Comment:  last use about 8 months ago   • Sexual activity: Not Currently   Other Topics Concern   • Not on file   Social History Narrative    Not , no children.     Has 2 siblings, brother  recently.     Family estranged.    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 Patient unable to tell anything about his previous jobs.      Social Determinants of Health     Financial Resource Strain:    • Social Determinants: Financial Resource Strain: Not on file   Food Insecurity:    • Social Determinants: Food Insecurity: Not on file   Transportation Needs:    • Lack of Transportation (Medical): Not on file   • Lack of Transportation (Non-Medical): Not on file   Physical Activity:    • Days of Exercise per Week: Not on file   • Minutes of Exercise per Session: Not on file   Stress:    • Social Determinants: Stress: Not on file   Social Connections:    • Social Determinants: Social Connections: Not on file   Intimate Partner Violence: Not At Risk   • Social Determinants: Intimate Partner Violence Past Fear: No   • Social Determinants: Intimate Partner Violence Current Fear: No       Family History   Problem Relation Age of Onset   • Diabetes Mother    • Heart disease Mother    • Hypertension Mother    • Stroke Mother    • Cancer Father        ALLERGIES:   Allergen Reactions   • Brovex Pb Dm Other (See Comments)       Medications/Infusions:  Medications Prior to Admission   Medication Sig Dispense Refill   • pioglitazone (ACTOS) 30 MG tablet Take 30 mg by mouth daily.     • acetaminophen (TYLENOL) 325 MG tablet Take 650 mg by mouth every 4 hours as  needed.     • furosemide (LASIX) 20 MG tablet TAKE 1 Tablet BY MOUTH ON MONDAY AND FRIDAY 66 tablet 0   • amLODIPine (NORVASC) 10 MG tablet Take 1 Tablet by mouth 1 time a day 90 tablet 2   • Aspirin Low Dose 81 MG EC tablet Take 1 Tablet by mouth 1 time a day 90 tablet 2   • terazosin (HYTRIN) 5 MG capsule Take 1 Capsule  by mouth 1 time a day at bedtime 90 capsule 1   • Tradjenta 5 MG tablet Take 1 Tablet by mouth 1 time a day 90 tablet 1   • B Complex-C-Folic Acid (RENAL-SUE PO) Take 1 tablet by mouth daily.     • cholecalciferol 1.25 mg (50,000 units) tablet Take 1 tablet by mouth 1 day a week. Sunday     • atorvastatin (LIPITOR) 40 MG tablet Take 1 Tablet  by mouth 1 time a day at bedtime 30 tablet 6   • metoPROLOL succinate (TOPROL-XL) 25 MG 24 hr tablet Take 1 Tablet by mouth 1 time a day 30 tablet 3   • ergocalciferol (DRISDOL) 1.25 mg (50,000 units) capsule Take 1 capsule by mouth 1 day a week. 12 capsule 3   • albuterol (PROAIR HFA) 108 (90 Base) MCG/ACT inhaler Inhale 2 puffs into the lungs every 4 hours as needed for Shortness of Breath or Wheezing.     • travoprost, benzalkonium, (TRAVATAN) 0.004 % ophthalmic solution Place 1 drop into both eyes nightly.     • fluticasone-vilanterol (BREO ELLIPTA) 100-25 MCG/INH inhaler Inhale 1 Puff into the lungs once daily 60 each 11   • lanthanum (FOSRENOL) 1000 MG chewable tablet Chew 1 tablet by mouth 3 times daily.     • sevelamer carbonate (RENVELA) 800 MG tablet Take 800 mg by mouth 3 times daily (with meals).     • blood glucose (ONE TOUCH ULTRA TEST) test strip Test blood sugar 1 times daily as directed. Diagnosis: E11.9. Meter: One touch 100 each 3   • sodium bicarbonate 650 MG tablet Take 2 tablets by mouth 2 times daily. 120 tablet 3   • ferrous sulfate 325 (65 FE) MG tablet Take 1 tablet by mouth 3 times daily (with meals). 90 tablet 6     • aspirin  81 mg Per NG tube Daily   • atorvastatin  40 mg Per NG tube Nightly   • [START ON 11/1/2021] furosemide   20 mg Per NG tube Once per day on Mon Fri   • pioglitazone  30 mg Per NG tube Daily   • sodium bicarbonate  1,300 mg Per NG tube BID   • sevelamer carbonate  800 mg Oral TID WC   • thiamine  100 mg Per NG tube Daily   • folic acid  1 mg Per NG tube Daily   • dexamethasone  6 mg Intravenous Daily   • sodium chloride (PF)  2 mL Intracatheter 2 times per day   • heparin (porcine)  5,000 Units Subcutaneous 3 times per day   • [Held by provider] amLODIPine  10 mg Oral Daily   • fluticasone-vilanterol  1 puff Inhalation Daily   • [Held by provider] metoPROLOL succinate  25 mg Oral Daily   • [Held by provider] linaGLIPtin  5 mg Oral Daily   • [Held by provider] doxazosin  4 mg Oral Nightly   • latanoprost  1 drop Both Eyes Nightly         Review of Systems:  Other than what's mentioned in the history of present illness, the remainder of review of systems was reviewed and was negative.    Vital Signs :    Vital Last Value 24 Hour Range   Temperature 98 °F (36.7 °C) Temp  Min: 98 °F (36.7 °C)  Max: 99.7 °F (37.6 °C)   Pulse 96 Pulse  Min: 61  Max: 107   Respiratory 18 Resp  Min: 16  Max: 18   Blood Pressure 138/74 BP  Min: 80/54  Max: 138/74   Art BP   No data recorded   Pulse Oximetry 98 % SpO2  Min: 94 %  Max: 98 %     Vital Today Admitted   Weight 56.5 kg (124 lb 9 oz) Weight: 58.2 kg (128 lb 4.9 oz)   Height N/A Height: 6' 1\" (185.4 cm)   BMI N/A BMI (Calculated): 16.93     Intake/Output:    Last Stool Occurrence:  (0) (10/29/21 2227)    No intake/output data recorded.    I/O last 3 completed shifts:  In: 250 [I.V.:250]  Out: 1000 [Other:1000]      Intake/Output Summary (Last 24 hours) at 10/30/2021 0935  Last data filed at 10/29/2021 1420  Gross per 24 hour   Intake 250 ml   Output 1000 ml   Net -750 ml         Weight over the past 48 Hours:  Patient Vitals for the past 48 hrs:   Weight   10/29/21 0500 57.3 kg (126 lb 5.2 oz)   10/29/21 1110 57.5 kg (126 lb 12.2 oz)   10/29/21 1420 56.5 kg (124 lb 9 oz)   10/29/21 1434  56.5 kg (124 lb 9 oz)        Hemodynamics:      Last Value 24 Hour Range   CVP   No data recorded   PAS/PAD   No data recorded   PCWP   No data recorded   CO   No data recorded   CI   No data recorded   SVR   No data recorded   SV02   No data recorded       Physical Exam:    General:  Alert, mild to moderate acute distress  HEENT: Head atraumatic, normocephalic.  Moist oral mucus membranes.  Sclera non-icteric.   Neck: Supple, no JVD.  Trachea midline.  Chest/Lungs: Normal effort.  Symmetrical expansion.  Clear to auscultation.  No wheezes, rales or rhonchi.  CVS: Regular rate and rhythm. S1,S2 well heard. There is no S3 or S4 gallop. Has no pericardial rub heard.  Abdomen: BS well heard. Soft, non tender, non distended.  No hepatosplenomegaly.  Neuro:  Nonverbal, grossly intact  Skin: Warm, dry.  No rashes.   Extremities: Pulses intact and symmetric. No clubbing or cyanosis. no edema.    Dialysis Access:  Av fistula    Laboratory Results:  Recent Labs   Lab 10/30/21  0518 10/29/21  0421 10/28/21  0355 10/27/21  0954   SODIUM 141 139 139 138   POTASSIUM 3.6 5.0 3.8 3.9   CHLORIDE 102 101 99 96*   CO2 26 22 28 27   ANIONGAP 17 21* 16 19   BUN 42* 51* 31* 64*   CREATININE 5.47* 6.51* 5.11* 9.20*   GLUCOSE 91 134* 120* 115*   CALCIUM 8.0* 7.3* 7.5* 7.4*   ALBUMIN 2.6* 2.5* 2.5* 2.6*   MG  --   --  2.0  --    AST 33 54* 49* 57*   GPT 37 42 44 45   ALKPT 79 79 79 81   BILIRUBIN 0.4 0.5 0.4 0.4   LIPA  --   --   --  104   CPK  --   --   --  938*       Recent Labs   Lab 10/30/21  0518 10/29/21  0623 10/29/21  0421 10/28/21  0355 10/27/21  0955   WBC 9.8  --  9.7 7.8  --    HGB 10.8*  --  10.3* 10.9*  --    HCT 34.4*  --  32.4* 33.6*  --      --  363 285  --    FERR  --  4,707*  --   --  4,507*       No results found    Recent Labs   Lab 10/29/21  0421 10/27/21  0954   CRP 4.6* 9.0*       Urine Panel  Recent Labs   Lab 10/27/21  1209   USPG 1.016   UPH 5.0   UKET Trace*   UPROT 100 *   UGLU Negative   UBILI Negative    UROB 2.0*   UWBC Negative   UNITR Negative   URBC Small*       Imaging/Procedures:  Noted        Impression, Plan & Recommendations:  · ESRD:  On Monday Wednesday Friday schedule   · Dialysis Schedule:  Continue dialysis per schedule  · Access:  Av fistula for access  · Volume management:  UF as tolerated    · Hypertension:  Blood pressure stable  · Acute respiratory failure:  Diagnosed positive for COVID  · Altered mental status and metabolic encephalopathy:  Likely multifactorial, better   · Anemia:  Hemoglobin stable    Current Hb, lytes, fluid acceptable. Plan for HD on Monday.

## 2024-12-02 NOTE — PROGRESS NOTES
[facility] Medicine  Progress Note    Patient Name: Abigail Babb  MRN: [id number]  Patient Class: IP- Inpatient   Admission Date: 11/29/2024  Length of Stay: 3 days  Attending Physician: Iliana Mandel MD  Primary Care Provider: Faraz Nguyễn MD        Subjective     Principal Problem:Acute on chronic congestive heart failure        HPI:  Abigail Babb is a 79 year old female with comorbid conditions of COPD, CHF, COPD and CAD who presented to ED for further evaluation of respiratory distress and hypoxia that began around 0400 this morning. Patient woke up very dyspneic and was not able to catch her breath. Per EMS, patient oxygen was 68 requiring NPPV via BIPAP. She does not wear supplemental oxygen. Patient is followed by Dr. Gama for CAD, CHF, and valvular disease. She does not take Lasix regularly due to CKD. She went to [place] yesterday for Thanksgiving dinner and did not restrict her sodium intake. She denies any symptoms prior to going to bed.     Imaging in ED consistent with CHF, and patient received 60mg IV Lasix. She has been weaned from Bipap to 3L nasal cannula. Elevated troponin and BNP noted.HM consulted for admission.     Overview/Hospital Course:  Abigail Babb is a 79 year old female who presented to ED with respiratory distress and hypoxia. Work up in ED consistent with decompensated heart failure. She required NIPPV via BIPAP and was aggressively diuresed resulting in reduction of oxygen requirements. EKG and echocardiogram with findings of left bundle branch block. Troponin elevated consistent with NSTEMI. Left bundle branch block has now resolved with treatment. Cardiology will discuss need for LHC.     12/1/24  Responding well to diuretics. Now on 3L NC. Blood pressure uncontrolled amlodipine added for optimization. Plans for LHC tomorrow morning. Contrast induced nephropathy discussed at length. Patient would like to proceed. Noted left  upper extremity cellulitis from dog scratch. Will be treated with topical and PO abx.       CBC remains stable, WBC-4.82, H/H-9.5/29.5. PTT elevated- 55.3, BMP has improving renal function BUN/Cr-42/1.6. Cardiology plans to take for heart cath today. Patient aware and agreeable. UOP adequate response to diuresis.     Heart Cath- right main- 100% occlusion, distal left circ- 70%. Previous stent in left main intact and patent. Optimize and continue medical management, and risk factor reduction recommended.      Interval History: No acute events over night. Agrees to hearth cath for today. Denies SOB, chest pains or any other c/o.    Review of Systems   Constitutional:  Negative for activity change.   HENT:  Negative for congestion.    Eyes:  Negative for visual disturbance.   Respiratory:  Negative for cough, chest tightness and shortness of breath.    Cardiovascular:  Negative for chest pain, palpitations and leg swelling.   Gastrointestinal:  Negative for abdominal distention, nausea and vomiting.   Genitourinary:  Negative for difficulty urinating.   Musculoskeletal:  Negative for arthralgias and myalgias.   Skin:  Negative for color change.   Neurological:  Negative for dizziness, syncope, weakness and light-headedness.     Objective:     Vital Signs (Most Recent):  Temp: 98 °F (36.7 °C) (12/02/24 1144)  Pulse: 70 (12/02/24 1144)  Resp: 20 (12/02/24 1144)  BP: (!) 147/63 (12/02/24 1144)  SpO2: (!) 90 % (12/02/24 1144) Vital Signs (24h Range):  Temp:  [97.2 °F (36.2 °C)-98.5 °F (36.9 °C)] 98 °F (36.7 °C)  Pulse:  [54-71] 70  Resp:  [18-20] 20  SpO2:  [90 %-94 %] 90 %  BP: (143-172)/(63-81) 147/63     Weight: 77.7 kg (171 lb 6.5 oz)  Body mass index is 30.36 kg/m².    Intake/Output Summary (Last 24 hours) at 12/2/2024 1240  Last data filed at 12/1/2024 2357  Gross per 24 hour   Intake 120 ml   Output 600 ml   Net -480 ml         Physical Exam  Vitals and nursing note reviewed.   Constitutional:       General: She is  not in acute distress.     Appearance: She is obese.   HENT:      Mouth/Throat:      Mouth: Mucous membranes are moist.   Eyes:      Pupils: Pupils are equal, round, and reactive to light.   Cardiovascular:      Rate and Rhythm: Bradycardia present.      Pulses: Normal pulses.      Heart sounds: Murmur heard.   Pulmonary:      Effort: Pulmonary effort is normal. No respiratory distress.      Breath sounds: Normal breath sounds.   Musculoskeletal:      Right lower leg: No edema.      Left lower leg: No edema.   Skin:     General: Skin is warm and dry.      Capillary Refill: Capillary refill takes 2 to 3 seconds.   Neurological:      Mental Status: She is alert and oriented to person, place, and time.           Significant Labs: All pertinent labs within the past 24 hours have been reviewed.  BMP:   Recent Labs   Lab 12/02/24  0549   GLU 91      K 3.8   CL 99   CO2 27   BUN 42*   CREATININE 1.6*   CALCIUM 9.6   MG 1.8     CBC:   Recent Labs   Lab 12/01/24  0636 12/02/24  0549   WBC 7.81 4.82   HGB 9.2* 9.5*   HCT 28.3* 29.5*    243     Coagulation:   Recent Labs   Lab 12/02/24  0549   APTT 55.3*       Significant Imaging: I have reviewed all pertinent imaging results/findings within the past 24 hours.    Assessment and Plan     * Acute on chronic congestive heart failure  Presents with acute dyspnea and respiratory distress initially requiring NIPPV via BIPAP. CXR consistent with CHF and diuresed with positive response. Patient has been weaned to 3L nasal cannula. She does not take Lasix regularly due to kidney function.     Patient has  unknown type  heart failure that is Acute on chronic. On presentation their CHF was decompensated. Evidence of decompensated CHF on presentation includes: crackles on lung auscultation, paroxysmal nocturnal dyspnea (PND), dyspnea on exertion (CHANCE), and shortness of breath. The etiology of their decompensation is likely dietary indiscretion. Most recent BNP and echo  "results are listed below.  No results for input(s): "BNP" in the last 72 hours.    Latest ECHO  Results for orders placed during the hospital encounter of 11/29/24    Echo Saline Bubble? No    Interpretation Summary    Left Ventricle: The left ventricle is normal in size. Normal wall thickness. There is concentric hypertrophy. Regional wall motion abnormalities present. Septal motion is consistent with bundle branch block. There is normal systolic function with a visually estimated ejection fraction of 55 - 60%. Ejection fraction is approximately 55%. Grade II diastolic dysfunction.    Right Ventricle: Normal right ventricular cavity size. Wall thickness is normal. Systolic function is normal.    Left Atrium: Left atrium is severely dilated.    Aortic Valve: There is mild stenosis. Aortic valve area by VTI is 1.5 cm². Aortic valve peak velocity is 3.0 m/s. Mean gradient is 21.5 mmHg. The dimensionless index is 0.40. There is mild aortic regurgitation.    Mitral Valve: There is mild to moderate regurgitation.    IVC/SVC: Intermediate venous pressure at 8 mmHg.    Current Heart Failure Medications  , ,   , , Oral  , , Oral  , , Oral  carvediloL tablet 25 mg, 2 times daily, Oral  furosemide tablet 40 mg, Daily, Oral    Plan  - Monitor strict I&Os and daily weights.    - Place on telemetry  - Low sodium diet  - Place on fluid restriction of 1.5 L.   - Cardiology has been consulted  - The patient's volume status is uncontrolled  -follow Echocardiogram results, diurese    11/30/24  Echocardiogram showed preserved EF, DD, and septal wall motion consistent with LBBB.  Patient is responding well to diuresis. Has diuresed 2.9L    Management as above    12/1/24  Responding well. Output 4.4L. repeat CXR pending. Reduce Lasix to daily    12/2  Heart cath scheduled for today. Continues with adequate UOP. Oxygen sats wnl.    Left arm cellulitis  Related to dog injury  Topical mupirocin  Doxycycline.     Redness " clearing      Nonrheumatic aortic valve stenosis  Echocardiogram with evidence of aortic stenosis that is mild . The patient's most recent echocardiogram result is listed below. We will manage the valvular abnormality by diuresis    Echo Saline Bubble? No    Result Date: 11/29/2024    Left Ventricle: The left ventricle is normal in size. Normal wall   thickness. There is concentric hypertrophy. Regional wall motion   abnormalities present. Septal motion is consistent with bundle branch   block. There is normal systolic function with a visually estimated   ejection fraction of 55 - 60%. Ejection fraction is approximately 55%.   Grade II diastolic dysfunction.    Right Ventricle: Normal right ventricular cavity size. Wall thickness   is normal. Systolic function is normal.    Left Atrium: Left atrium is severely dilated.    Aortic Valve: There is mild stenosis. Aortic valve area by VTI is 1.5   cm². Aortic valve peak velocity is 3.0 m/s. Mean gradient is 21.5 mmHg.   The dimensionless index is 0.40. There is mild aortic regurgitation.    Mitral Valve: There is mild to moderate regurgitation.    IVC/SVC: Intermediate venous pressure at 8 mmHg.          Anemia of chronic disease  Anemia is likely due to chronic disease due to Chronic Kidney Disease. Most recent hemoglobin and hematocrit are listed below.  Recent Labs     11/30/24  0447 12/01/24  0636 12/02/24  0549   HGB 9.1* 9.2* 9.5*   HCT 27.3* 28.3* 29.5*       Plan  - Monitor serial CBC: Daily  - Transfuse PRBC if patient becomes hemodynamically unstable, symptomatic or H/H drops below 7/21.  - Patient has not received any PRBC transfusions to date  - Patient's anemia is currently stable      COPD (chronic obstructive pulmonary disease)  Patient's COPD is controlled currently.  Patient is currently off COPD Pathway. Continue scheduled inhalers Steroids and Supplemental oxygen and monitor respiratory status closely.     CKD (chronic kidney disease), stage  III  Creatine stable for now. BMP reviewed- noted Estimated Creatinine Clearance: 29.1 mL/min (A) (based on SCr of 1.6 mg/dL (H)). according to latest data. Based on current GFR, CKD stage is stage 3 - GFR 30-59.    --Patient is followed by Dr. Hair  --Hold Farxiga now for proteinuria   --Kidney function at baseline.   --Monitor UOP and serial BMP and adjust therapy as needed.   --Renally dose meds. Avoid nephrotoxic medications and procedures.    11/30/24  Kidney function stable. Diurese    12/1/24  Kidney function improving.   Patient understands risk of contrast induced nephropathy with [facility]    Breast cancer  Left mastectomy with lumph node dissection in 6/2023  --takes Ibrance and Faslodex injection.   --Followed by Dr. Breaux    Primary hypertension  Patients blood pressure range in the last 24 hours was: BP  Min: 143/63  Max: 172/81.The patient's inpatient anti-hypertensive regimen is listed below:  Current Antihypertensives  , ,   , , Oral  , , Oral  carvediloL tablet 25 mg, 2 times daily, Oral  isosorbide mononitrate 24 hr tablet 30 mg, Daily, Oral  amLODIPine tablet 5 mg, Daily, Oral  furosemide tablet 40 mg, Daily, Oral    Plan  - BP is controlled, no changes needed to their regimen      LBBB (left bundle branch block)  Noted on EKG  --patient has a history of CAD  --followed by Dr. Gama  Cardiology consulted    11/30/23  Also noted on Echocardiogram  Repeat today showed resolution of LBBB after diuresis  Cardiology will decide on need for [facility]    12/2  Heart cath scheduled for 12/2.      CAD (coronary artery disease)  Patient with known CAD s/p stent placement. Will continue Statin and monitor for S/Sx of angina/ACS. Continue to monitor on telemetry.   --patient is followed by Dr. Brant Lundberg  --S/p PCI 10/2023    11/30/23   See plan for NSTEMI    NSTEMI (non-ST elevated myocardial infarction)  Recent Labs     11/29/24 2104 11/30/24  0447   TROPONINI 2.224* 1.635*     ?demand ischemia from  CHF versus concern for cardiac ischemia  Follow trends  Echocardiogram pending  Heparin infusion initiated in ED  Cardiology input appreciated    11/30/23  Troponin trend consistent with NSTEM  Continue heparin infusion.   Cardiology will discuss need for heparin infusion  Continue ASA, Plavix, BB, and STATIN    12/1/24  Plans for [facility] in AM    12/2  Heart cath scheduled for today    Acute respiratory failure with hypoxia  Patient with Hypoxic Respiratory failure which is Acute.  she is not on home oxygen. Supplemental oxygen was provided and noted-      .   Signs/symptoms of respiratory failure include- tachypnea, increased work of breathing, respiratory distress, and use of accessory muscles. Contributing diagnoses includes - CHF Labs and images were reviewed. Patient Has recent ABG, which has been reviewed. Will treat underlying causes and adjust management of respiratory failure as follows-   --Treat underlying CHF    12/1/24  Weaning. On 3L nasal cannula    O2 saturations wnl.       VTE Risk Mitigation (From admission, onward)           Ordered     IP VTE HIGH RISK PATIENT  Once         11/29/24 1159     Place sequential compression device  Until discontinued         11/29/24 1159     heparin 25,000 units in dextrose 5% (100 units/ml) IV bolus from bag LOW INTENSITY nomogram - [facility]  As needed (PRN)        Question:  Heparin Infusion Adjustment (DO NOT MODIFY ANSWER)  Answer:  [email or web address]\Images\Pharmacy\HeparinInfusions\heparin LOW INTENSITY nomogram for [facility] [id number]    11/29/24 0725     heparin 25,000 units in dextrose 5% (100 units/ml) IV bolus from bag LOW INTENSITY nomogram - [facility]  As needed (PRN)        Question:  Heparin Infusion Adjustment (DO NOT MODIFY ANSWER)  Answer:  [email or web address]\Images\Pharmacy\HeparinInfusions\heparin LOW INTENSITY nomogram for [facility] [id number].pdf    11/29/24 0725     heparin 25,000 units in dextrose 5% 250 mL (100 units/mL) infusion LOW INTENSITY nomogram - [facility]  Continuous         Question:  Begin at (units/kg/hr)  Answer:  12 11/29/24 0725                    Discharge Planning   JANEEN:      Code Status: Full Code   Is the patient medically ready for discharge?:     Reason for patient still in hospital (select all that apply):   Discharge Plan A: Home with family                        NAHOMY Ragland-C  Department of Hospital Medicine   [facility] - Telemetry ([facility])

## 2024-12-02 NOTE — ASSESSMENT & PLAN NOTE
Anemia is likely due to chronic disease due to Chronic Kidney Disease. Most recent hemoglobin and hematocrit are listed below.  Recent Labs     11/30/24  0447 12/01/24  0636 12/02/24  0549   HGB 9.1* 9.2* 9.5*   HCT 27.3* 28.3* 29.5*       Plan  - Monitor serial CBC: Daily  - Transfuse PRBC if patient becomes hemodynamically unstable, symptomatic or H/H drops below 7/21.  - Patient has not received any PRBC transfusions to date  - Patient's anemia is currently stable

## 2024-12-02 NOTE — NURSING
Rec'd to [facility]  NADN  Drowsy but does respond to simple requests appropriately.  Neuro assessment performed/intact.

## 2024-12-02 NOTE — INTERVAL H&P NOTE
The patient has been examined and the H&P has been reviewed:    I concur with the findings and no changes have occurred since H&P was written.    Procedure risks, benefits and alternative options discussed and understood by patient/family.          Active Hospital Problems    Diagnosis  POA    *Acute on chronic congestive heart failure [I50.9]  Yes    Left arm cellulitis [L03.114]  Yes    Nonrheumatic aortic valve stenosis [I35.0]  Yes    Acute respiratory failure with hypoxia [J96.01]  Yes    NSTEMI (non-ST elevated myocardial infarction) [I21.4]  Yes    CAD (coronary artery disease) [I25.10]  Yes    LBBB (left bundle branch block) [I44.7]  Yes    Primary hypertension [I10]  Yes    Breast cancer [C50.919]  Yes    CKD (chronic kidney disease), stage III [N18.30]  Yes    COPD (chronic obstructive pulmonary disease) [J44.9]  Yes    Anemia of chronic disease [D63.8]  Yes      Resolved Hospital Problems   No resolved problems to display.

## 2024-12-02 NOTE — ASSESSMENT & PLAN NOTE
Recent Labs     11/29/24  2104 11/30/24  0447   TROPONINI 2.224* 1.635*     ?demand ischemia from CHF versus concern for cardiac ischemia  Follow trends  Echocardiogram pending  Heparin infusion initiated in ED  Cardiology input appreciated    11/30/23  Troponin trend consistent with NSTEM  Continue heparin infusion.   Cardiology will discuss need for heparin infusion  Continue ASA, Plavix, BB, and STATIN    12/1/24  Plans for LHC in AM    12/2  Heart cath scheduled for today

## 2024-12-02 NOTE — PROGRESS NOTES
[facility] (Hospital)  Cardiology  Progress Note    Patient Name: Abigail Bbab  MRN: [id number]  Admission Date: 11/29/2024  Hospital Length of Stay: 3 days  Code Status: Full Code   Attending Physician: Iliana Mandel MD   Primary Care Physician: Faraz Nguyễn MD  Expected Discharge Date:   Principal Problem:Acute on chronic congestive heart failure    Subjective:   Ms. Babb is a 79 year old female patient whose current medical conditions include CHF, COPD, HTN, breast CA, AS, and CAD s/p prior PCI in 10/23 who presented to [facility] ED due to acute onset of SOB at 4 AM. She denied any associated fever, chills, lita CP, palpitations, nausea, or vomiting. Called EMS and was noted be hypoxic with OS sat of  68% upon arrival. She was placed on bipap and given breathing treatments/nebs along with sprays of nitro en route. Initial workup revealed creatinine of 2.0, BNP of 101, and troponin of 0.092. CXR showed findings consistent with pulmonary edema. LBBB was noted on EKG and patient was subsequently placed on Tridil and heparin drips and admitted to ICU for further evaluation and treatment. Cardiology consulted to assist with management. Patient seen and examined today, in ED. Feeling better, admits to fatigue. SOB improved. CP free. She reports compliance with her medications. Admits she ate ham/Thanksgiving food yesterday. Followed routinely by OP cardiologist. Dr. Gama. Prior TTE in 7/23 with normal EF, repeat pending.      Hospital Course:   11/30/2024 has diuresed well duy dn examined denies any respiratory symptoms. Her lung exam improved. Her lbbb has resolved. She ruled in fo nstemi    12/1/2024 seen and examined has improved clinically no shortness of breath no respiratory compromise. Her creatinine is nearing baseline.htn not controlled will add amlodipine    12/2/24 pt seen and examined no acute CV events reported, labs reviewed, chart reviewed. [facility] planned for today        Review  of Systems   Constitutional: Negative.   HENT: Negative.     Eyes: Negative.    Cardiovascular: Negative.    Respiratory: Negative.     Skin: Negative.    Musculoskeletal: Negative.    Gastrointestinal: Negative.    Genitourinary: Negative.    Neurological: Negative.    Psychiatric/Behavioral: Negative.       Objective:     Vital Signs (Most Recent):  Temp: 98 °F (36.7 °C) (12/02/24 1144)  Pulse: 70 (12/02/24 1300)  Resp: 20 (12/02/24 1144)  BP: (!) 147/63 (12/02/24 1144)  SpO2: (!) 90 % (12/02/24 1144) Vital Signs (24h Range):  Temp:  [97.2 °F (36.2 °C)-98.5 °F (36.9 °C)] 98 °F (36.7 °C)  Pulse:  [54-71] 70  Resp:  [18-20] 20  SpO2:  [90 %-94 %] 90 %  BP: (143-172)/(63-81) 147/63     Weight: 77.7 kg (171 lb 6.5 oz)  Body mass index is 30.36 kg/m².     SpO2: (!) 90 %         Intake/Output Summary (Last 24 hours) at 12/2/2024 1524  Last data filed at 12/1/2024 2357  Gross per 24 hour   Intake --   Output 0 ml   Net 0 ml       Lines/Drains/Airways       Drain  Duration             Female External Urinary Catheter w/ Suction 11/29/24 2224 2 days              Peripheral Intravenous Line  Duration                  Peripheral IV - Single Lumen 11/29/24 0742 20 G Right Antecubital 3 days                       Physical Exam  Vitals and nursing note reviewed.   Constitutional:       Appearance: Normal appearance.   HENT:      Head: Normocephalic.   Eyes:      Pupils: Pupils are equal, round, and reactive to light.   Cardiovascular:      Rate and Rhythm: Normal rate and regular rhythm.      Heart sounds: Normal heart sounds, S1 normal and S2 normal. No murmur heard.     No S3 or S4 sounds.   Pulmonary:      Effort: Pulmonary effort is normal.      Breath sounds: Normal breath sounds.   Abdominal:      General: Bowel sounds are normal.      Palpations: Abdomen is soft.   Musculoskeletal:         General: Normal range of motion.      Cervical back: Normal range of motion.   Skin:     Capillary Refill: Capillary refill takes  "less than 2 seconds.   Neurological:      General: No focal deficit present.      Mental Status: She is alert and oriented to person, place, and time.   Psychiatric:         Mood and Affect: Mood normal.         Behavior: Behavior normal.         Thought Content: Thought content normal.            Significant Labs: BMP:   Recent Labs   Lab 12/01/24  0636 12/02/24  0549   GLU 95 91    139   K 3.5 3.8    99   CO2 30* 27   BUN 45* 42*   CREATININE 1.8* 1.6*   CALCIUM 9.8 9.6   MG  --  1.8   , CMP   Recent Labs   Lab 12/01/24  0636 12/02/24  0549    139   K 3.5 3.8    99   CO2 30* 27   GLU 95 91   BUN 45* 42*   CREATININE 1.8* 1.6*   CALCIUM 9.8 9.6   ANIONGAP 11 13   , CBC   Recent Labs   Lab 12/01/24  0636 12/02/24  0549   WBC 7.81 4.82   HGB 9.2* 9.5*   HCT 28.3* 29.5*    243   , INR No results for input(s): "INR", "PROTIME" in the last 48 hours., Lipid Panel No results for input(s): "CHOL", "HDL", "LDLCALC", "TRIG", "CHOLHDL" in the last 48 hours., Troponin No results for input(s): "TROPONINI" in the last 48 hours., and All pertinent lab results from the last 24 hours have been reviewed.    Significant Imaging: Cardiac Cath: reviewed, Echocardiogram: Transthoracic echo (TTE) complete (Cupid Only):   Results for orders placed or performed during the hospital encounter of 11/29/24   Echo Saline Bubble? No   Result Value Ref Range    BSA 2.02 m2    LVOT stroke volume 123.9 cm3    LVIDd 5.0 3.5 - 6.0 cm    LV Systolic Volume 49.23 mL    LV Systolic Volume Index 25.4 mL/m2    LVIDs 3.5 2.1 - 4.0 cm    LV ESV A2C 73.84 mL    LV Diastolic Volume 117.96 mL    LV ESV A4C 74.49 mL    LV Diastolic Volume Index 60.80 mL/m2    Left Ventricular End Systolic Volume by Teichholz Method 49.23 mL    Left Ventricular End Diastolic Volume by Teichholz Method 117.96 mL    IVS 1.4 (A) 0.6 - 1.1 cm    LVOT diameter 2.2 cm    LVOT area 3.8 cm2    FS 30.0 28 - 44 %    Left Ventricle Relative Wall Thickness " 0.60 cm    PW 1.5 (A) 0.6 - 1.1 cm    LV mass 306.8 g    LV Mass Index 158.1 g/m2    MV Peak E James 1.07 m/s    TDI LATERAL 0.07 m/s    TDI SEPTAL 0.04 m/s    E/E' ratio 19.45 m/s    MV Peak A James 1.47 m/s    E/A ratio 0.73     IVRT 85.63 msec    E wave deceleration time 193.31 msec    LV SEPTAL E/E' RATIO 26.75 m/s    LV LATERAL E/E' RATIO 15.29 m/s    LVOT peak james 1.2 m/s    Left Ventricular Outflow Tract Mean Velocity 0.77 cm/s    Left Ventricular Outflow Tract Mean Gradient 2.69 mmHg    RVOT peak VTI 27.3 cm    TAPSE 2.82 cm    LA size 3.94 cm    Left Atrium Minor Axis 5.65 cm    Left Atrium Major Axis 5.91 cm    LA Vol (MOD) 75.65 mL    SANTOSH (MOD) 39.0 mL/m2    RA Major Axis 5.21 cm    AV regurgitation pressure 1/2 time 494.277677452524887 ms    AR Max James 3.83 m/s    AV mean gradient 21.5 mmHg    AV peak gradient 36.0 mmHg    Ao peak james 3.0 m/s    Ao VTI 82.4 cm    LVOT peak VTI 32.6 cm    AV valve area 1.5 cm²    AV Velocity Ratio 0.40     AV index (prosthetic) 0.40     NOA by Velocity Ratio 1.5 cm²    MV stenosis pressure 1/2 time 56.06 ms    MV valve area p 1/2 method 3.92 cm2    PV mean gradient 3 mmHg    PV PEAK VELOCITY 1.28 m/s    PV peak gradient 7 mmHg    RVOT peak james 1.18 m/s    Ao root annulus 3.17 cm    STJ 2.89 cm    Ascending aorta 2.45 cm    IVC diameter 2.24 cm    Mean e' 0.06 m/s    ZLVIDS 0.25     ZLVIDD -0.97     LA area A4C 23.18 cm2    LA area A2C 22.91 cm2    RVDD 3.01 cm    SANTOSH 51.9 mL/m2    LA Vol 100.61 cm3    LA WIDTH 5.2 cm    RA Width 2.8 cm    EF 55 %    Est. RA pres 8 mmHg    Narrative      Left Ventricle: The left ventricle is normal in size. Normal wall   thickness. There is concentric hypertrophy. Regional wall motion   abnormalities present. Septal motion is consistent with bundle branch   block. There is normal systolic function with a visually estimated   ejection fraction of 55 - 60%. Ejection fraction is approximately 55%.   Grade II diastolic dysfunction.    Right  Ventricle: Normal right ventricular cavity size. Wall thickness   is normal. Systolic function is normal.    Left Atrium: Left atrium is severely dilated.    Aortic Valve: There is mild stenosis. Aortic valve area by VTI is 1.5   cm². Aortic valve peak velocity is 3.0 m/s. Mean gradient is 21.5 mmHg.   The dimensionless index is 0.40. There is mild aortic regurgitation.    Mitral Valve: There is mild to moderate regurgitation.    IVC/SVC: Intermediate venous pressure at 8 mmHg.     , EKG: reviewed, Stress Test: reviewed, and X-Ray: CXR: X-Ray Chest 1 View (CXR): No results found for this visit on 11/29/24.  Assessment and Plan:       * Acute on chronic congestive heart failure  -See plan under SOB  -TTE from 7/23 with normal EF, repeat pending  -Admits to eating Thanksgiving food  -On Lasix prn as OP, likely needs daily dose  -Continue OMT-BB, Farxiga  -Aldactone if creatinine permits, not on ACEi/ARB or Entresto as OP    11/30/2024  Improved clinically with diuresis will continue the same.     Nonrheumatic aortic valve stenosis  Has moderate aortic stenosis stable.    CKD (chronic kidney disease), stage III  Renal function stable with diuresis will monitor    Breast cancer  -Per primary team    Primary hypertension  -Continue home meds    12/1/2024 will add amlodipine     LBBB (left bundle branch block)  -Noted on EKG, no prior to compare  -TTE pending  -Continue OMT  -Records requested from Dr. Gama to review recent cath    11/30/2024  Her lbbb has resolved. After diuresis.    CAD (coronary artery disease)  -See plan under elevated trop    NSTEMI (non-ST elevated myocardial infarction)  -Initial troponin 0.092, repeat pending  -Elevation likely secondary to demand ischemia from hypoxia, decompensated CHF, COPD  -Continue heparin gtt, ASA, BB, statin, Imdur  -Reports [facility] 10/23 with PCI of   -TTE pending    11/30/2024  Had labs suggestive of nstemi   Has known 3 vessel cad need to discuss coronary angiography  despite risk of contrast nephropathy    12/1/2024  Will plan on coronary angiography and rhc in am pending renal function in am.   Discussed contrast nephropathy    12/2/24  [facility] planend for today  All risks, benefits and treatment alternatives explained all questions answered pt agreeable to proceed  Monitor overnight after    Acute respiratory failure with hypoxia  -Secondary to decompensated CHF + underlying COPD  -Continue IV diuresis  -Continue nebs/pulmonary toilet  -Strict I's/O's  -TTE pending    11/30/2024  Improved with diuresis.        VTE Risk Mitigation (From admission, onward)           Ordered     heparin (porcine) injection  As needed (PRN)         12/02/24 1512     IP VTE HIGH RISK PATIENT  Once         11/29/24 1159     Place sequential compression device  Until discontinued         11/29/24 1159     heparin 25,000 units in dextrose 5% (100 units/ml) IV bolus from bag LOW INTENSITY nomogram - [facility]  As needed (PRN)        Question:  Heparin Infusion Adjustment (DO NOT MODIFY ANSWER)  Answer:  \\[email or web address]\epic\Images\Pharmacy\HeparinInfusions\heparin LOW INTENSITY nomogram for [facility] [id number].pdf    11/29/24 0725     heparin 25,000 units in dextrose 5% (100 units/ml) IV bolus from bag LOW INTENSITY nomogram - [facility]  As needed (PRN)        Question:  Heparin Infusion Adjustment (DO NOT MODIFY ANSWER)  Answer:  \\[email or web address]\epic\Images\Pharmacy\HeparinInfusions\heparin LOW INTENSITY nomogram for [facility] [id number].pdf    11/29/24 0725     heparin 25,000 units in dextrose 5% 250 mL (100 units/mL) infusion LOW INTENSITY nomogram - [facility]  Continuous        Question:  Begin at (units/kg/hr)  Answer:  12    11/29/24 0725                    Makeda Franklin NP  Cardiology  [facility] ([facility])

## 2024-12-02 NOTE — ASSESSMENT & PLAN NOTE
Patients blood pressure range in the last 24 hours was: BP  Min: 143/63  Max: 172/81.The patient's inpatient anti-hypertensive regimen is listed below:  Current Antihypertensives  , ,   , , Oral  , , Oral  carvediloL tablet 25 mg, 2 times daily, Oral  isosorbide mononitrate 24 hr tablet 30 mg, Daily, Oral  amLODIPine tablet 5 mg, Daily, Oral  furosemide tablet 40 mg, Daily, Oral    Plan  - BP is controlled, no changes needed to their regimen

## 2024-12-03 VITALS
TEMPERATURE: 98 F | HEART RATE: 80 BPM | OXYGEN SATURATION: 92 % | BODY MASS INDEX: 30.04 KG/M2 | HEIGHT: 63 IN | SYSTOLIC BLOOD PRESSURE: 153 MMHG | DIASTOLIC BLOOD PRESSURE: 67 MMHG | WEIGHT: 169.56 LBS | RESPIRATION RATE: 18 BRPM

## 2024-12-03 LAB
ANION GAP SERPL CALC-SCNC: 11 MMOL/L (ref 8–16)
BASOPHILS # BLD AUTO: 0.04 K/UL (ref 0–0.2)
BASOPHILS # BLD AUTO: 0.04 K/UL (ref 0–0.2)
BASOPHILS NFR BLD: 0.8 % (ref 0–1.9)
BASOPHILS NFR BLD: 0.8 % (ref 0–1.9)
BUN SERPL-MCNC: 39 MG/DL (ref 8–23)
CALCIUM SERPL-MCNC: 10.1 MG/DL (ref 8.7–10.5)
CHLORIDE SERPL-SCNC: 101 MMOL/L (ref 95–110)
CO2 SERPL-SCNC: 29 MMOL/L (ref 23–29)
CREAT SERPL-MCNC: 1.6 MG/DL (ref 0.5–1.4)
DIFFERENTIAL METHOD BLD: ABNORMAL
DIFFERENTIAL METHOD BLD: ABNORMAL
EOSINOPHIL # BLD AUTO: 0.2 K/UL (ref 0–0.5)
EOSINOPHIL # BLD AUTO: 0.2 K/UL (ref 0–0.5)
EOSINOPHIL NFR BLD: 2.9 % (ref 0–8)
EOSINOPHIL NFR BLD: 2.9 % (ref 0–8)
ERYTHROCYTE [DISTWIDTH] IN BLOOD BY AUTOMATED COUNT: 15.7 % (ref 11.5–14.5)
ERYTHROCYTE [DISTWIDTH] IN BLOOD BY AUTOMATED COUNT: 15.7 % (ref 11.5–14.5)
EST. GFR  (NO RACE VARIABLE): 33 ML/MIN/1.73 M^2
GLUCOSE SERPL-MCNC: 94 MG/DL (ref 70–110)
HCT VFR BLD AUTO: 30.9 % (ref 37–48.5)
HCT VFR BLD AUTO: 30.9 % (ref 37–48.5)
HGB BLD-MCNC: 9.7 G/DL (ref 12–16)
HGB BLD-MCNC: 9.7 G/DL (ref 12–16)
IMM GRANULOCYTES # BLD AUTO: 0.05 K/UL (ref 0–0.04)
IMM GRANULOCYTES # BLD AUTO: 0.05 K/UL (ref 0–0.04)
IMM GRANULOCYTES NFR BLD AUTO: 1 % (ref 0–0.5)
IMM GRANULOCYTES NFR BLD AUTO: 1 % (ref 0–0.5)
LYMPHOCYTES # BLD AUTO: 0.9 K/UL (ref 1–4.8)
LYMPHOCYTES # BLD AUTO: 0.9 K/UL (ref 1–4.8)
LYMPHOCYTES NFR BLD: 16.4 % (ref 18–48)
LYMPHOCYTES NFR BLD: 16.4 % (ref 18–48)
MCH RBC QN AUTO: 33.6 PG (ref 27–31)
MCH RBC QN AUTO: 33.6 PG (ref 27–31)
MCHC RBC AUTO-ENTMCNC: 31.4 G/DL (ref 32–36)
MCHC RBC AUTO-ENTMCNC: 31.4 G/DL (ref 32–36)
MCV RBC AUTO: 107 FL (ref 82–98)
MCV RBC AUTO: 107 FL (ref 82–98)
MONOCYTES # BLD AUTO: 0.6 K/UL (ref 0.3–1)
MONOCYTES # BLD AUTO: 0.6 K/UL (ref 0.3–1)
MONOCYTES NFR BLD: 11 % (ref 4–15)
MONOCYTES NFR BLD: 11 % (ref 4–15)
NEUTROPHILS # BLD AUTO: 3.5 K/UL (ref 1.8–7.7)
NEUTROPHILS # BLD AUTO: 3.5 K/UL (ref 1.8–7.7)
NEUTROPHILS NFR BLD: 67.9 % (ref 38–73)
NEUTROPHILS NFR BLD: 67.9 % (ref 38–73)
NRBC BLD-RTO: 0 /100 WBC
NRBC BLD-RTO: 0 /100 WBC
PLATELET # BLD AUTO: 275 K/UL (ref 150–450)
PLATELET # BLD AUTO: 275 K/UL (ref 150–450)
PMV BLD AUTO: 11.2 FL (ref 9.2–12.9)
PMV BLD AUTO: 11.2 FL (ref 9.2–12.9)
POCT GLUCOSE: 96 MG/DL (ref 70–110)
POTASSIUM SERPL-SCNC: 3.5 MMOL/L (ref 3.5–5.1)
RBC # BLD AUTO: 2.89 M/UL (ref 4–5.4)
RBC # BLD AUTO: 2.89 M/UL (ref 4–5.4)
SODIUM SERPL-SCNC: 141 MMOL/L (ref 136–145)
URATE SERPL-MCNC: 10.1 MG/DL (ref 2.4–5.7)
WBC # BLD AUTO: 5.18 K/UL (ref 3.9–12.7)
WBC # BLD AUTO: 5.18 K/UL (ref 3.9–12.7)

## 2024-12-03 PROCEDURE — 94799 UNLISTED PULMONARY SVC/PX: CPT

## 2024-12-03 PROCEDURE — 99900035 HC TECH TIME PER 15 MIN (STAT)

## 2024-12-03 PROCEDURE — 27000221 HC OXYGEN, UP TO 24 HOURS

## 2024-12-03 PROCEDURE — 85025 COMPLETE CBC W/AUTO DIFF WBC: CPT | Performed by: INTERNAL MEDICINE

## 2024-12-03 PROCEDURE — 25000242 PHARM REV CODE 250 ALT 637 W/ HCPCS: Performed by: INTERNAL MEDICINE

## 2024-12-03 PROCEDURE — 25000003 PHARM REV CODE 250: Performed by: INTERNAL MEDICINE

## 2024-12-03 PROCEDURE — 36415 COLL VENOUS BLD VENIPUNCTURE: CPT | Performed by: INTERNAL MEDICINE

## 2024-12-03 PROCEDURE — 63600175 PHARM REV CODE 636 W HCPCS: Performed by: INTERNAL MEDICINE

## 2024-12-03 PROCEDURE — 25000003 PHARM REV CODE 250

## 2024-12-03 PROCEDURE — 94761 N-INVAS EAR/PLS OXIMETRY MLT: CPT

## 2024-12-03 PROCEDURE — 94640 AIRWAY INHALATION TREATMENT: CPT

## 2024-12-03 PROCEDURE — 80048 BASIC METABOLIC PNL TOTAL CA: CPT | Performed by: INTERNAL MEDICINE

## 2024-12-03 PROCEDURE — 84550 ASSAY OF BLOOD/URIC ACID: CPT | Performed by: INTERNAL MEDICINE

## 2024-12-03 PROCEDURE — 99232 SBSQ HOSP IP/OBS MODERATE 35: CPT | Mod: ,,, | Performed by: INTERNAL MEDICINE

## 2024-12-03 RX ORDER — PREDNISONE 20 MG/1
20 TABLET ORAL DAILY
Qty: 3 TABLET | Refills: 0 | Status: SHIPPED | OUTPATIENT
Start: 2024-12-03 | End: 2024-12-06

## 2024-12-03 RX ORDER — AMLODIPINE BESYLATE 5 MG/1
5 TABLET ORAL DAILY
Qty: 90 TABLET | Refills: 0 | Status: SHIPPED | OUTPATIENT
Start: 2024-12-04 | End: 2025-03-04

## 2024-12-03 RX ORDER — BENZONATATE 100 MG/1
100 CAPSULE ORAL EVERY 8 HOURS PRN
Qty: 10 CAPSULE | Refills: 0 | Status: SHIPPED | OUTPATIENT
Start: 2024-12-03

## 2024-12-03 RX ORDER — PREDNISONE 20 MG/1
20 TABLET ORAL DAILY
Status: DISCONTINUED | OUTPATIENT
Start: 2024-12-03 | End: 2024-12-03 | Stop reason: HOSPADM

## 2024-12-03 RX ORDER — POTASSIUM CHLORIDE 20 MEQ/1
40 TABLET, EXTENDED RELEASE ORAL ONCE
Status: DISCONTINUED | OUTPATIENT
Start: 2024-12-03 | End: 2024-12-03 | Stop reason: HOSPADM

## 2024-12-03 RX ORDER — DOXYCYCLINE HYCLATE 100 MG
100 TABLET ORAL EVERY 12 HOURS
Qty: 6 TABLET | Refills: 0 | Status: SHIPPED | OUTPATIENT
Start: 2024-12-03 | End: 2024-12-06

## 2024-12-03 RX ADMIN — IPRATROPIUM BROMIDE AND ALBUTEROL SULFATE 3 ML: 2.5; .5 SOLUTION RESPIRATORY (INHALATION) at 08:12

## 2024-12-03 RX ADMIN — ARFORMOTEROL TARTRATE 15 MCG: 15 SOLUTION RESPIRATORY (INHALATION) at 08:12

## 2024-12-03 RX ADMIN — FUROSEMIDE 40 MG: 40 TABLET ORAL at 10:12

## 2024-12-03 RX ADMIN — AMLODIPINE BESYLATE 5 MG: 5 TABLET ORAL at 10:12

## 2024-12-03 RX ADMIN — ISOSORBIDE MONONITRATE 30 MG: 30 TABLET, EXTENDED RELEASE ORAL at 10:12

## 2024-12-03 RX ADMIN — BUDESONIDE INHALATION 0.5 MG: 0.5 SUSPENSION RESPIRATORY (INHALATION) at 08:12

## 2024-12-03 RX ADMIN — CLOPIDOGREL BISULFATE 75 MG: 75 TABLET ORAL at 10:12

## 2024-12-03 RX ADMIN — CARVEDILOL 25 MG: 12.5 TABLET, FILM COATED ORAL at 10:12

## 2024-12-03 RX ADMIN — PREDNISONE 20 MG: 20 TABLET ORAL at 03:12

## 2024-12-03 RX ADMIN — MUPIROCIN: 20 OINTMENT TOPICAL at 10:12

## 2024-12-03 RX ADMIN — HYDROCODONE BITARTRATE AND ACETAMINOPHEN 1 TABLET: 10; 325 TABLET ORAL at 10:12

## 2024-12-03 RX ADMIN — ASPIRIN 81 MG CHEWABLE TABLET 81 MG: 81 TABLET CHEWABLE at 10:12

## 2024-12-03 RX ADMIN — DOXYCYCLINE HYCLATE 100 MG: 100 TABLET, COATED ORAL at 10:12

## 2024-12-03 RX ADMIN — PANTOPRAZOLE SODIUM 40 MG: 40 TABLET, DELAYED RELEASE ORAL at 10:12

## 2024-12-03 RX ADMIN — CETIRIZINE HYDROCHLORIDE 5 MG: 5 TABLET ORAL at 10:12

## 2024-12-03 RX ADMIN — ATORVASTATIN CALCIUM 40 MG: 40 TABLET, FILM COATED ORAL at 10:12

## 2024-12-03 NOTE — PROGRESS NOTES
[facility] ([facility])  Cardiology  Progress Note    Patient Name: Abigail Babb  MRN: [id number]  Admission Date: 11/29/2024  Hospital Length of Stay: 4 days  Code Status: Full Code   Attending Physician: Iliana Mandel MD   Primary Care Physician: Faraz Nguyễn MD  Expected Discharge Date: 12/3/2024  Principal Problem:Acute on chronic congestive heart failure    Subjective:     Hospital Course:   11/30/2024 has diuresed well duy dn examined denies any respiratory symptoms. Her lung exam improved. Her lbbb has resolved. She ruled in fo nstemi    12/1/2024 seen and examined has improved clinically no shortness of breath no respiratory compromise. Her creatinine is nearing baseline.htn not controlled will add amlodipine    12/2/24 pt seen and examined no acute CV events reported, labs reviewed, chart reviewed. [facility] planned for today    12/3/24  Patient seen and examined today, no acute CV events reported, s/p LHC with  LAD stent patent.  Denies any chest pain or CHF symptoms at this time.  Labs reviewed creatinine is stable        Review of Systems   Constitutional: Negative.   HENT: Negative.     Eyes: Negative.    Cardiovascular: Negative.    Respiratory: Negative.     Skin:  Positive for color change.   Musculoskeletal: Negative.    Gastrointestinal: Negative.    Genitourinary: Negative.    Neurological: Negative.    Psychiatric/Behavioral: Negative.       Objective:     Vital Signs (Most Recent):  Temp: 99.2 °F (37.3 °C) (12/03/24 1218)  Pulse: 73 (12/03/24 1400)  Resp: 18 (12/03/24 1218)  BP: (!) 105/50 (12/03/24 1218)  SpO2: (!) 90 % (12/03/24 1218) Vital Signs (24h Range):  Temp:  [97.4 °F (36.3 °C)-99.2 °F (37.3 °C)] 99.2 °F (37.3 °C)  Pulse:  [58-79] 73  Resp:  [17-20] 18  SpO2:  [90 %-98 %] 90 %  BP: (105-182)/(50-76) 105/50     Weight: 76.9 kg (169 lb 8.5 oz)  Body mass index is 30.03 kg/m².     SpO2: (!) 90 %         Intake/Output Summary (Last 24 hours) at 12/3/2024 1438  Last  "data filed at 12/3/2024 0357  Gross per 24 hour   Intake 405.82 ml   Output 0 ml   Net 405.82 ml       Lines/Drains/Airways       None                      Physical Exam  Vitals and nursing note reviewed.   Constitutional:       Appearance: Normal appearance.   HENT:      Head: Normocephalic.   Eyes:      Pupils: Pupils are equal, round, and reactive to light.   Cardiovascular:      Rate and Rhythm: Normal rate and regular rhythm.      Heart sounds: Normal heart sounds, S1 normal and S2 normal. No murmur heard.     No S3 or S4 sounds.   Pulmonary:      Effort: Pulmonary effort is normal.      Breath sounds: Normal breath sounds.   Abdominal:      General: Bowel sounds are normal.      Palpations: Abdomen is soft.   Musculoskeletal:         General: Normal range of motion.      Cervical back: Normal range of motion.   Skin:     Capillary Refill: Capillary refill takes less than 2 seconds.      Findings: Bruising present.      Comments: [facility] site with bruising, pulses palpable   Neurological:      General: No focal deficit present.      Mental Status: She is alert and oriented to person, place, and time.   Psychiatric:         Mood and Affect: Mood normal.         Behavior: Behavior normal.         Thought Content: Thought content normal.            Significant Labs: BMP:   Recent Labs   Lab 12/02/24  0549 12/03/24  0514   GLU 91 94    141   K 3.8 3.5   CL 99 101   CO2 27 29   BUN 42* 39*   CREATININE 1.6* 1.6*   CALCIUM 9.6 10.1   MG 1.8  --    , CMP   Recent Labs   Lab 12/02/24  0549 12/03/24  0514    141   K 3.8 3.5   CL 99 101   CO2 27 29   GLU 91 94   BUN 42* 39*   CREATININE 1.6* 1.6*   CALCIUM 9.6 10.1   ANIONGAP 13 11   , CBC   Recent Labs   Lab 12/02/24  0549 12/03/24  0514   WBC 4.82 5.18  5.18   HGB 9.5* 9.7*  9.7*   HCT 29.5* 30.9*  30.9*    275  275   , INR No results for input(s): "INR", "PROTIME" in the last 48 hours., Lipid Panel No results for input(s): "CHOL", "HDL", "LDLCALC", " ""TRIG", "CHOLHDL" in the last 48 hours., Troponin No results for input(s): "TROPONINI" in the last 48 hours., and All pertinent lab results from the last 24 hours have been reviewed.    Significant Imaging: Cardiac Cath: reviewed, Echocardiogram: Transthoracic echo (TTE) complete (Cupid Only):   Results for orders placed or performed during the hospital encounter of 11/29/24   Echo Saline Bubble? No   Result Value Ref Range    BSA 2.02 m2    LVOT stroke volume 123.9 cm3    LVIDd 5.0 3.5 - 6.0 cm    LV Systolic Volume 49.23 mL    LV Systolic Volume Index 25.4 mL/m2    LVIDs 3.5 2.1 - 4.0 cm    LV ESV A2C 73.84 mL    LV Diastolic Volume 117.96 mL    LV ESV A4C 74.49 mL    LV Diastolic Volume Index 60.80 mL/m2    Left Ventricular End Systolic Volume by Teichholz Method 49.23 mL    Left Ventricular End Diastolic Volume by Teichholz Method 117.96 mL    IVS 1.4 (A) 0.6 - 1.1 cm    LVOT diameter 2.2 cm    LVOT area 3.8 cm2    FS 30.0 28 - 44 %    Left Ventricle Relative Wall Thickness 0.60 cm    PW 1.5 (A) 0.6 - 1.1 cm    LV mass 306.8 g    LV Mass Index 158.1 g/m2    MV Peak E James 1.07 m/s    TDI LATERAL 0.07 m/s    TDI SEPTAL 0.04 m/s    E/E' ratio 19.45 m/s    MV Peak A James 1.47 m/s    E/A ratio 0.73     IVRT 85.63 msec    E wave deceleration time 193.31 msec    LV SEPTAL E/E' RATIO 26.75 m/s    LV LATERAL E/E' RATIO 15.29 m/s    LVOT peak james 1.2 m/s    Left Ventricular Outflow Tract Mean Velocity 0.77 cm/s    Left Ventricular Outflow Tract Mean Gradient 2.69 mmHg    RVOT peak VTI 27.3 cm    TAPSE 2.82 cm    LA size 3.94 cm    Left Atrium Minor Axis 5.65 cm    Left Atrium Major Axis 5.91 cm    LA Vol (MOD) 75.65 mL    SANTOSH (MOD) 39.0 mL/m2    RA Major Axis 5.21 cm    AV regurgitation pressure 1/2 time 494.721520111424327 ms    AR Max James 3.83 m/s    AV mean gradient 21.5 mmHg    AV peak gradient 36.0 mmHg    Ao peak james 3.0 m/s    Ao VTI 82.4 cm    LVOT peak VTI 32.6 cm    AV valve area 1.5 cm²    AV Velocity Ratio " 0.40     AV index (prosthetic) 0.40     NOA by Velocity Ratio 1.5 cm²    MV stenosis pressure 1/2 time 56.06 ms    MV valve area p 1/2 method 3.92 cm2    PV mean gradient 3 mmHg    PV PEAK VELOCITY 1.28 m/s    PV peak gradient 7 mmHg    RVOT peak jorge 1.18 m/s    Ao root annulus 3.17 cm    STJ 2.89 cm    Ascending aorta 2.45 cm    IVC diameter 2.24 cm    Mean e' 0.06 m/s    ZLVIDS 0.25     ZLVIDD -0.97     LA area A4C 23.18 cm2    LA area A2C 22.91 cm2    RVDD 3.01 cm    SANTOSH 51.9 mL/m2    LA Vol 100.61 cm3    LA WIDTH 5.2 cm    RA Width 2.8 cm    EF 55 %    Est. RA pres 8 mmHg    Narrative      Left Ventricle: The left ventricle is normal in size. Normal wall   thickness. There is concentric hypertrophy. Regional wall motion   abnormalities present. Septal motion is consistent with bundle branch   block. There is normal systolic function with a visually estimated   ejection fraction of 55 - 60%. Ejection fraction is approximately 55%.   Grade II diastolic dysfunction.    Right Ventricle: Normal right ventricular cavity size. Wall thickness   is normal. Systolic function is normal.    Left Atrium: Left atrium is severely dilated.    Aortic Valve: There is mild stenosis. Aortic valve area by VTI is 1.5   cm². Aortic valve peak velocity is 3.0 m/s. Mean gradient is 21.5 mmHg.   The dimensionless index is 0.40. There is mild aortic regurgitation.    Mitral Valve: There is mild to moderate regurgitation.    IVC/SVC: Intermediate venous pressure at 8 mmHg.     , EKG:  Reviewed, Stress Test:  Reviewed, and X-Ray: CXR: X-Ray Chest 1 View (CXR): No results found for this visit on 11/29/24.  Assessment and Plan:       * Acute on chronic congestive heart failure  -See plan under SOB  -TTE from 7/23 with normal EF, repeat pending  -Admits to eating Thanksgiving food  -On Lasix prn as OP, likely needs daily dose  -Continue OMT-BB, Farxiga  -Aldactone if creatinine permits, not on ACEi/ARB or Entresto as  OP    11/30/2024  Improved clinically with diuresis will continue the same.     Nonrheumatic aortic valve stenosis  Has moderate aortic stenosis stable.    CKD (chronic kidney disease), stage III  Renal function stable with diuresis will monitor    Breast cancer  -Per primary team    Primary hypertension  -Continue home meds    12/1/2024 will add amlodipine     LBBB (left bundle branch block)  -Noted on EKG, no prior to compare  -TTE pending  -Continue OMT  -Records requested from Dr. Gama to review recent cath    11/30/2024  Her lbbb has resolved. After diuresis.    CAD (coronary artery disease)  -See plan under elevated trop    NSTEMI (non-ST elevated myocardial infarction)  -Initial troponin 0.092, repeat pending  -Elevation likely secondary to demand ischemia from hypoxia, decompensated CHF, COPD  -Continue heparin gtt, ASA, BB, statin, Imdur  -Reports [facility] 10/23 with PCI of   -TTE pending    11/30/2024  Had labs suggestive of nstemi   Has known 3 vessel cad need to discuss coronary angiography despite risk of contrast nephropathy    12/1/2024  Will plan on coronary angiography and rhc in am pending renal function in am.   Discussed contrast nephropathy    12/2/24  [facility] planend for today  All risks, benefits and treatment alternatives explained all questions answered pt agreeable to proceed  Monitor overnight after    12/3/24  [facility] patent LAD stent  Continue OMT  Aspirin, atorvastatin, carvedilol, Plavix, Imdur  Follow up in clinic in 2 weeks with Dr. Gama    Acute respiratory failure with hypoxia  -Secondary to decompensated CHF + underlying COPD  -Continue IV diuresis  -Continue nebs/pulmonary toilet  -Strict I's/O's  -TTE pending    11/30/2024  Improved with diuresis.        VTE Risk Mitigation (From admission, onward)           Ordered     IP VTE HIGH RISK PATIENT  Once         11/29/24 1159     Place sequential compression device  Until discontinued         11/29/24 1159                    Makeda CASTILLO  INA Franklin  Cardiology  [facility] Tele[facility] ([facility])

## 2024-12-03 NOTE — NURSING
Call to Dalila RN for report    S/P R & L Heart Cath      NO FIX    NS at 100 till 10pm tonite  SB per monitor    Please assist to ambulate.      Family aware of outcome after speaking w/Disha at bedside in CVRU    R wrist dsg clean, dry, and intact post removal of band    A & O X 3  Neuro intact    Transferred via bed to [id number] by Nurses x 2

## 2024-12-03 NOTE — CONSULTS
12/03/24 1559   Post-Acute Status   Post-Acute Authorization [facility] Status Set-up Complete/Auth obtained   Coverage [employer]   Discharge Delays None known at this time   Discharge Plan   Discharge Plan A Home with family     Tran, with [facility], gave approval for Patient's home oxygen.  SW pulled equipment and delivered to bedside.

## 2024-12-03 NOTE — PLAN OF CARE
A224/A224 TEVINMireya Babb is a 79 y.o.female admitted on 11/29/2024 for Acute on chronic congestive heart failure   Code Status: Full Code MRN: [id number]   Review of patient's allergies indicates:   Allergen Reactions    Penicillins      Past Medical History:   Diagnosis Date    Breast cancer     CHF (congestive heart failure)     COPD (chronic obstructive pulmonary disease)     Hypertension     Nonrheumatic aortic valve stenosis 11/30/2024      PRN meds    acetaminophen, 650 mg, Q4H PRN  albuterol-ipratropium, 3 mL, Q4H PRN  ALPRAZolam, 0.5 mg, Daily PRN  aluminum-magnesium hydroxide-simethicone, 30 mL, QID PRN  benzonatate, 100 mg, Q8H PRN  dextrose 10%, 12.5 g, PRN  dextrose 10%, 25 g, PRN  glucagon (human recombinant), 1 mg, PRN  glucose, 16 g, PRN  glucose, 24 g, PRN  HYDROcodone-acetaminophen, 1 tablet, Q6H PRN  melatonin, 6 mg, Nightly PRN  naloxone, 0.02 mg, PRN  ondansetron, 8 mg, Q8H PRN  ondansetron, 8 mg, Q8H PRN  senna-docusate 8.6-50 mg, 1 tablet, BID PRN  simethicone, 1 tablet, QID PRN      Chart check completed. Will continue plan of care.      Orientation: oriented x 4  Allons Coma Scale Score: 15     Lead Monitored: Lead II Rhythm: sinus bradycardia    Cardiac/Telemetry Box Number: [id number]  VTE Core Measure: Pharmacological prophylaxis initiated/maintained Last Bowel Movement: 11/28/24  Diet diabetic Cardiac (Low Na/Chol); 2000 Calories (up to 75 gm per meal)  Voiding Characteristics: voids spontaneously without difficulty  Zaid Score: 21  Fall Risk Score: 11  Accucheck []   Freq?      Lines/Drains/Airways       Drain  Duration             Female External Urinary Catheter w/ Suction 11/29/24 2224 2 days              Peripheral Intravenous Line  Duration                  Peripheral IV - Single Lumen 11/29/24 0742 20 G Right Antecubital 3 days

## 2024-12-03 NOTE — PLAN OF CARE
[facility] - Telemetry (Hospital)  Discharge Final Note    Primary Care Provider: Faraz Nguyễn MD    Expected Discharge Date: 12/3/2024    Final Discharge Note (most recent)       Final Note - 12/03/24 1600          Final Note    Anticipated Discharge Disposition Home or Self Care        Post-Acute Status    Post-Acute Authorization HME     HME Status Set-up Complete/Auth obtained     Coverage [employer] Managed Medicare     Discharge Delays None known at this time                     Important Message from Medicare             Contact Info       [facility]   Specialty: DME Provider    [address] [place] [id number]   Phone: [phone]       Next Steps: Call    Instructions: Home Medical Equipment          DC Disposition: home with family, Home oxygen via [facility]  Family Notified: Patient and spouse at bedside  Transportation: personal transportation    Patient needed HME Oxygen reviewed and delivered to bedside. LUIS sent message to Tran Angelic, with [facility], for review. Tran stated Patient was approved and LUIS delivered HME oxygen, to bedside, with informational sheets. [facility] added to Patient's AVS for any additional needs.     Patient has resources to schedule hospital follow up with non-[facility] PCP on AVS.

## 2024-12-03 NOTE — PLAN OF CARE
Plan of care and orders reviewed with patient. Medications reviewed with patient. Safety measures inplace including bed locked in lowest position call light in reach and side rails for mobility. Chart and orders reviewed.     Problem: Infection  Goal: Absence of Infection Signs and Symptoms  Outcome: Progressing     Problem: Skin Injury Risk Increased  Goal: Skin Health and Integrity  Outcome: Progressing     Problem: Adult Inpatient Plan of Care  Goal: Plan of Care Review  Outcome: Progressing  Goal: Patient-Specific Goal (Individualized)  Outcome: Progressing  Goal: Absence of Hospital-Acquired Illness or Injury  Outcome: Progressing  Goal: Optimal Comfort and Wellbeing  Outcome: Progressing  Goal: Readiness for Transition of Care  Outcome: Progressing

## 2024-12-03 NOTE — RESPIRATORY THERAPY
Home Oxygen Evaluation - [facility] - Cardiopulmonary Department      Date Performed: 12/3/2024      1) Patient's Home O2 Sat on room air, while at rest: Room Air SpO2 At Rest: 92 %        If O2 sats on room air at rest are 88% or below, patient qualifies.  Document O2 liter flow needed in Step 2.  If O2 sats are 89% or above, complete Step 3.        2)  If patient is not ambulated and O2 sats are <88%, what is the O2 liter flow required to meet ordered saturation? Home O2 Eval Comments: Patient does qualify for home o2    If O2 sats on room air while exercising remain 89% or above patient does not qualify, no further testing needed Document N/A in step 3. If O2 sats on room air while exercising are 88% or below, continue to Step 4.    3) Patient's O2 Sat on room air while exercising: Room Air SpO2 During Ambulation: (!) 88 %        4) Patient's O2 Sat while exercising on O2: SpO2 During Ambulation on O2: (!) 89 % at Ambulation O2 LPM: 2 LPM         (Must show improvement from #4 for patients to qualify)

## 2024-12-03 NOTE — NURSING
R Wrist TR Band removed from wrist.  Large bruise noted  No hematoma.  Gauze dsg/clear plastic tape w/sm amt tension/splint reapplied   JIGNA Green RN JIGNA Green RN to leela branham

## 2024-12-03 NOTE — SUBJECTIVE & OBJECTIVE
Review of Systems   Constitutional: Negative.   HENT: Negative.     Eyes: Negative.    Cardiovascular: Negative.    Respiratory: Negative.     Skin:  Positive for color change.   Musculoskeletal: Negative.    Gastrointestinal: Negative.    Genitourinary: Negative.    Neurological: Negative.    Psychiatric/Behavioral: Negative.       Objective:     Vital Signs (Most Recent):  Temp: 99.2 °F (37.3 °C) (12/03/24 1218)  Pulse: 73 (12/03/24 1400)  Resp: 18 (12/03/24 1218)  BP: (!) 105/50 (12/03/24 1218)  SpO2: (!) 90 % (12/03/24 1218) Vital Signs (24h Range):  Temp:  [97.4 °F (36.3 °C)-99.2 °F (37.3 °C)] 99.2 °F (37.3 °C)  Pulse:  [58-79] 73  Resp:  [17-20] 18  SpO2:  [90 %-98 %] 90 %  BP: (105-182)/(50-76) 105/50     Weight: 76.9 kg (169 lb 8.5 oz)  Body mass index is 30.03 kg/m².     SpO2: (!) 90 %         Intake/Output Summary (Last 24 hours) at 12/3/2024 1438  Last data filed at 12/3/2024 0357  Gross per 24 hour   Intake 405.82 ml   Output 0 ml   Net 405.82 ml       Lines/Drains/Airways       None                      Physical Exam  Vitals and nursing note reviewed.   Constitutional:       Appearance: Normal appearance.   HENT:      Head: Normocephalic.   Eyes:      Pupils: Pupils are equal, round, and reactive to light.   Cardiovascular:      Rate and Rhythm: Normal rate and regular rhythm.      Heart sounds: Normal heart sounds, S1 normal and S2 normal. No murmur heard.     No S3 or S4 sounds.   Pulmonary:      Effort: Pulmonary effort is normal.      Breath sounds: Normal breath sounds.   Abdominal:      General: Bowel sounds are normal.      Palpations: Abdomen is soft.   Musculoskeletal:         General: Normal range of motion.      Cervical back: Normal range of motion.   Skin:     Capillary Refill: Capillary refill takes less than 2 seconds.      Findings: Bruising present.      Comments: [facility] site with bruising, pulses palpable   Neurological:      General: No focal deficit present.      Mental Status: She  "is alert and oriented to person, place, and time.   Psychiatric:         Mood and Affect: Mood normal.         Behavior: Behavior normal.         Thought Content: Thought content normal.            Significant Labs: BMP:   Recent Labs   Lab 12/02/24  0549 12/03/24  0514   GLU 91 94    141   K 3.8 3.5   CL 99 101   CO2 27 29   BUN 42* 39*   CREATININE 1.6* 1.6*   CALCIUM 9.6 10.1   MG 1.8  --    , CMP   Recent Labs   Lab 12/02/24  0549 12/03/24  0514    141   K 3.8 3.5   CL 99 101   CO2 27 29   GLU 91 94   BUN 42* 39*   CREATININE 1.6* 1.6*   CALCIUM 9.6 10.1   ANIONGAP 13 11   , CBC   Recent Labs   Lab 12/02/24  0549 12/03/24  0514   WBC 4.82 5.18  5.18   HGB 9.5* 9.7*  9.7*   HCT 29.5* 30.9*  30.9*    275  275   , INR No results for input(s): "INR", "PROTIME" in the last 48 hours., Lipid Panel No results for input(s): "CHOL", "HDL", "LDLCALC", "TRIG", "CHOLHDL" in the last 48 hours., Troponin No results for input(s): "TROPONINI" in the last 48 hours., and All pertinent lab results from the last 24 hours have been reviewed.    Significant Imaging: Cardiac Cath: reviewed, Echocardiogram: Transthoracic echo (TTE) complete (Cupid Only):   Results for orders placed or performed during the hospital encounter of 11/29/24   Echo Saline Bubble? No   Result Value Ref Range    BSA 2.02 m2    LVOT stroke volume 123.9 cm3    LVIDd 5.0 3.5 - 6.0 cm    LV Systolic Volume 49.23 mL    LV Systolic Volume Index 25.4 mL/m2    LVIDs 3.5 2.1 - 4.0 cm    LV ESV A2C 73.84 mL    LV Diastolic Volume 117.96 mL    LV ESV A4C 74.49 mL    LV Diastolic Volume Index 60.80 mL/m2    Left Ventricular End Systolic Volume by Teichholz Method 49.23 mL    Left Ventricular End Diastolic Volume by Teichholz Method 117.96 mL    IVS 1.4 (A) 0.6 - 1.1 cm    LVOT diameter 2.2 cm    LVOT area 3.8 cm2    FS 30.0 28 - 44 %    Left Ventricle Relative Wall Thickness 0.60 cm    PW 1.5 (A) 0.6 - 1.1 cm    LV mass 306.8 g    LV Mass Index 158.1 " g/m2    MV Peak E James 1.07 m/s    TDI LATERAL 0.07 m/s    TDI SEPTAL 0.04 m/s    E/E' ratio 19.45 m/s    MV Peak A James 1.47 m/s    E/A ratio 0.73     IVRT 85.63 msec    E wave deceleration time 193.31 msec    LV SEPTAL E/E' RATIO 26.75 m/s    LV LATERAL E/E' RATIO 15.29 m/s    LVOT peak james 1.2 m/s    Left Ventricular Outflow Tract Mean Velocity 0.77 cm/s    Left Ventricular Outflow Tract Mean Gradient 2.69 mmHg    RVOT peak VTI 27.3 cm    TAPSE 2.82 cm    LA size 3.94 cm    Left Atrium Minor Axis 5.65 cm    Left Atrium Major Axis 5.91 cm    LA Vol (MOD) 75.65 mL    SANTOSH (MOD) 39.0 mL/m2    RA Major Axis 5.21 cm    AV regurgitation pressure 1/2 time 494.128705390009315 ms    AR Max James 3.83 m/s    AV mean gradient 21.5 mmHg    AV peak gradient 36.0 mmHg    Ao peak james 3.0 m/s    Ao VTI 82.4 cm    LVOT peak VTI 32.6 cm    AV valve area 1.5 cm²    AV Velocity Ratio 0.40     AV index (prosthetic) 0.40     NOA by Velocity Ratio 1.5 cm²    MV stenosis pressure 1/2 time 56.06 ms    MV valve area p 1/2 method 3.92 cm2    PV mean gradient 3 mmHg    PV PEAK VELOCITY 1.28 m/s    PV peak gradient 7 mmHg    RVOT peak james 1.18 m/s    Ao root annulus 3.17 cm    STJ 2.89 cm    Ascending aorta 2.45 cm    IVC diameter 2.24 cm    Mean e' 0.06 m/s    ZLVIDS 0.25     ZLVIDD -0.97     LA area A4C 23.18 cm2    LA area A2C 22.91 cm2    RVDD 3.01 cm    SANTOSH 51.9 mL/m2    LA Vol 100.61 cm3    LA WIDTH 5.2 cm    RA Width 2.8 cm    EF 55 %    Est. RA pres 8 mmHg    Narrative      Left Ventricle: The left ventricle is normal in size. Normal wall   thickness. There is concentric hypertrophy. Regional wall motion   abnormalities present. Septal motion is consistent with bundle branch   block. There is normal systolic function with a visually estimated   ejection fraction of 55 - 60%. Ejection fraction is approximately 55%.   Grade II diastolic dysfunction.    Right Ventricle: Normal right ventricular cavity size. Wall thickness   is normal.  Systolic function is normal.    Left Atrium: Left atrium is severely dilated.    Aortic Valve: There is mild stenosis. Aortic valve area by VTI is 1.5   cm². Aortic valve peak velocity is 3.0 m/s. Mean gradient is 21.5 mmHg.   The dimensionless index is 0.40. There is mild aortic regurgitation.    Mitral Valve: There is mild to moderate regurgitation.    IVC/SVC: Intermediate venous pressure at 8 mmHg.     , EKG:  Reviewed, Stress Test:  Reviewed, and X-Ray: CXR: X-Ray Chest 1 View (CXR): No results found for this visit on 11/29/24.

## 2024-12-03 NOTE — ASSESSMENT & PLAN NOTE
-Initial troponin 0.092, repeat pending  -Elevation likely secondary to demand ischemia from hypoxia, decompensated CHF, COPD  -Continue heparin gtt, ASA, BB, statin, Imdur  -Reports [facility] 10/23 with PCI of   -TTE pending    11/30/2024  Had labs suggestive of nstemi   Has known 3 vessel cad need to discuss coronary angiography despite risk of contrast nephropathy    12/1/2024  Will plan on coronary angiography and rhc in am pending renal function in am.   Discussed contrast nephropathy    12/2/24  [facility] planend for today  All risks, benefits and treatment alternatives explained all questions answered pt agreeable to proceed  Monitor overnight after    12/3/24  [facility] patent LAD stent  Continue OMT  Aspirin, atorvastatin, carvedilol, Plavix, Imdur  Follow up in clinic in 2 weeks with Dr. Gama

## 2024-12-03 NOTE — RESPIRATORY THERAPY
Home Oxygen Evaluation - [facility] - Cardiopulmonary Department      Date Performed: 12/3/2024      1) Patient's Home O2 Sat on room air, while at rest: Room Air SpO2 At Rest: 92 %        If O2 sats on room air at rest are 88% or below, patient qualifies.  Document O2 liter flow needed in Step 2.  If O2 sats are 89% or above, complete Step 3.        2)  If patient is not ambulated and O2 sats are <88%, what is the O2 liter flow required to meet ordered saturation?     If O2 sats on room air while exercising remain 89% or above patient does not qualify, no further testing needed Document N/A in step 3. If O2 sats on room air while exercising are 88% or below, continue to Step 4.    3) Patient's O2 Sat on room air while exercisin) Patient's O2 Sat while exercising on O2: SpO2 During Ambulation on O2: (!) 89 % at Ambulation O2 LPM: 2 LPM       Pt requiring O2 2L NC to achieve 89% sat during activity  (Must show improvement from #4 for patients to qualify)

## 2024-12-03 NOTE — PLAN OF CARE
Problem: Infection  Goal: Absence of Infection Signs and Symptoms  Outcome: Adequate for Care Transition     Problem: Skin Injury Risk Increased  Goal: Skin Health and Integrity  Outcome: Adequate for Care Transition     Problem: Adult Inpatient Plan of Care  Goal: Plan of Care Review  Outcome: Adequate for Care Transition  Goal: Patient-Specific Goal (Individualized)  Outcome: Adequate for Care Transition  Goal: Absence of Hospital-Acquired Illness or Injury  Outcome: Adequate for Care Transition  Goal: Optimal Comfort and Wellbeing  Outcome: Adequate for Care Transition  Goal: Readiness for Transition of Care  Outcome: Adequate for Care Transition     Problem: Fall Injury Risk  Goal: Absence of Fall and Fall-Related Injury  Outcome: Adequate for Care Transition     Problem: Wound  Goal: Optimal Coping  Outcome: Adequate for Care Transition  Goal: Optimal Functional Ability  Outcome: Adequate for Care Transition  Goal: Absence of Infection Signs and Symptoms  Outcome: Adequate for Care Transition  Goal: Improved Oral Intake  Outcome: Adequate for Care Transition  Goal: Optimal Pain Control and Function  Outcome: Adequate for Care Transition  Goal: Skin Health and Integrity  Outcome: Adequate for Care Transition  Goal: Optimal Wound Healing  Outcome: Adequate for Care Transition

## 2024-12-03 NOTE — NURSING
Ambulated approx 20 '  WC to restroom  Reported voiding w/o diff  Inst given re care of r wrist w/splint  Verbalized understanding

## 2024-12-04 NOTE — PLAN OF CARE
A224/A224 ARLEN Babb is a 79 y.o.female admitted on 11/29/2024 for Acute on chronic congestive heart failure   Code Status: Full Code MRN: [id number]   Review of patient's allergies indicates:   Allergen Reactions    Penicillins      Past Medical History:   Diagnosis Date    Breast cancer     CHF (congestive heart failure)     COPD (chronic obstructive pulmonary disease)     Hypertension     Nonrheumatic aortic valve stenosis 11/30/2024      PRN meds    acetaminophen, 650 mg, Q4H PRN  albuterol-ipratropium, 3 mL, Q4H PRN  ALPRAZolam, 0.5 mg, Daily PRN  aluminum-magnesium hydroxide-simethicone, 30 mL, QID PRN  benzonatate, 100 mg, Q8H PRN  dextrose 10%, 12.5 g, PRN  dextrose 10%, 25 g, PRN  glucagon (human recombinant), 1 mg, PRN  glucose, 16 g, PRN  glucose, 24 g, PRN  HYDROcodone-acetaminophen, 1 tablet, Q6H PRN  melatonin, 6 mg, Nightly PRN  naloxone, 0.02 mg, PRN  ondansetron, 8 mg, Q8H PRN  ondansetron, 8 mg, Q8H PRN  senna-docusate 8.6-50 mg, 1 tablet, BID PRN  simethicone, 1 tablet, QID PRN      AVS Discharge instructions received and reviewed with pt and family at bedside.  Pt voiced understanding and all questions answered to satisfaction.  Stressed importance to making and keeping all follow up appointments.  Medications at bedside and reviewed with pt.  Tele monitor removed and brought to monitor tech.  IV d/c'd with tip intact, pressure dressing applied.  Pt  transported to front of hospital via w/c to be discharged home.      Orientation: oriented x 4  Stephenie Coma Scale Score: 15     Lead Monitored: Lead II Rhythm: normal sinus rhythm Frequency/Ectopy: PVCs  Cardiac/Telemetry Box Number: [id number]  VTE Core Measure: Pharmacological prophylaxis initiated/maintained Last Bowel Movement: 11/28/24  Diet Cardiac  Voiding Characteristics: voids spontaneously without difficulty  Zaid Score: 21  Fall Risk Score: 11  Accucheck []   Freq?      Lines/Drains/Airways       None

## 2024-12-05 ENCOUNTER — TELEPHONE (OUTPATIENT)
Dept: CARDIOLOGY | Facility: CLINIC | Age: 79
End: 2024-12-05
Payer: MEDICARE

## 2024-12-05 NOTE — TELEPHONE ENCOUNTER
"[facility]) hospital discharge 48-72 hour phone follow up completed.     Most Recent Hospital Discharge Date: 12/3  Last admission Diagnosis/chief complaint:SOB    TCC nurse Navigator spoke with pt    Current Patient reported weight: 169 lbs    Current Patient reported blood pressure and heart rate: No B/P Cuff    Pt reports the following:  []  Shortness of Breath with Activity  []  Shortness of Breath at rest   []  Fatigue  []  Edema   [] Chest pain or tightness  [] Weight Increase since discharge  [x] None of the above    Medications:   Discharge medication reviewed with pt.  Pt reports having medication list available and has all medications at home for use per list.     Education:   Confirmed pt received "Home Care Guide for Heart Failure Patients" while admitted.   Reviewed key points as listed below.     Recommend 2 -3 gram sodium restriction and 1500 cc-2000 cc fluid restriction.  Encourage physical activity with graded exercise program.  Requested patient to weigh themselves daily, and to notify us if their weight increases by more than 3 lbs in 1 day or 5 lbs in 3 days.   Reminded patient to use "Daily weight and symptom tracker" to record and guide patient on when and how to call [facility]. PT may also use symptom tracker if no scale available  Pt reports being in the green(color) Zone. If in yellow/red, reminded that they should be calling [facility] today or now.     Watch for these Signs and Symptoms: If any of these occur, contact [facility] immediately:   Increase in shortness of breath with movement   Increase in swelling in your legs and ankles   Weight gain of more than 3 pounds in a day or 5 pounds in 3 days.   Difficulty breathing when you are lying down   Worsening fatigue or tiredness   Stomach bloating, a full feeling or a loss of appetite   Increased coughing--especially when you are lying down      Pt was able to verbalize back to nurse in their own words correct " diet/fluid restrictions, necessity for exercise, warning signs and symptoms, when and how to contact their TCC team.      Pt educated on follow-up plan while in [facility] program to include:   Week 1 -  F/u appt with Provider and HF nurse (Date) Pt will F/U with Dr. Gama   Week 2-5 - In person/ Virtual/ phone call check ins    Week 5-7 - Pt will discharge from [facility] and transition to longterm care provider (Cardiology/PCP/ Advanced Heart Failure).      Patient active on myChart? No      Pt given the following contact information for ease of communication: [phone] (Mon-Fri, 8a-5p) & for urgent issues on the weekend call Deborah on-call [phone] to page the Cardiology MD on call.  Pt also encouraged utilize [facility] messaging as well.      Will follow up with pt at first clinic visit and HF nurse navigator available for pt questions, issues or concerns.

## 2024-12-07 NOTE — DISCHARGE SUMMARY
[facility] - Telemetry ([facility] Medicine  Discharge Summary      Patient Name: Abigail Babb  MRN: [id number]  [facility]: [id number]  Patient Class: IP- Inpatient  Admission Date: 11/29/2024  Hospital Length of Stay: 4 days  Discharge Date and Time: 12/3/2024  6:39 PM  Attending Physician: No att. providers found   Discharging Provider: Iliana Olmedo MD  Primary Care Provider: Faraz Nguyễn MD    Primary Care Team: Networked reference to record PCT     HPI:   Abigail Babb is a 79 year old female with comorbid conditions of COPD, CHF, COPD and CAD who presented to ED for further evaluation of respiratory distress and hypoxia that began around 0400 this morning. Patient woke up very dyspneic and was not able to catch her breath. Per EMS, patient oxygen was 68 requiring NPPV via BIPAP. She does not wear supplemental oxygen. Patient is followed by Dr. Gama for CAD, CHF, and valvular disease. She does not take Lasix regularly due to CKD. She went to [place] yesterday for Thanksgiving dinner and did not restrict her sodium intake. She denies any symptoms prior to going to bed.     Imaging in ED consistent with CHF, and patient received 60mg IV Lasix. She has been weaned from Bipap to 3L nasal cannula. Elevated troponin and BNP noted. consulted for admission.     Procedure(s) (LRB):  CATHETERIZATION, HEART, BOTH LEFT AND RIGHT (N/A)      Hospital Course:   Abigail Babb is a 79 year old female who presented to ED with respiratory distress and hypoxia. Work up in ED consistent with decompensated heart failure. She required NIPPV via BIPAP and was aggressively diuresed resulting in reduction of oxygen requirements. EKG and echocardiogram with findings of left bundle branch block. Troponin elevated consistent with NSTEMI. Left bundle branch block has now resolved with treatment. Cardiology will discuss need for LHC.     12/1/24  Responding well to diuretics. Now on 3L NC. Blood pressure uncontrolled  amlodipine added for optimization. Plans for [facility] tomorrow morning. Contrast induced nephropathy discussed at length. Patient would like to proceed. Noted left upper extremity cellulitis from dog scratch. Will be treated with topical and PO abx.       CBC remains stable, WBC-4.82, H/H-9.5/29.5. PTT elevated- 55.3, BMP has improving renal function BUN/Cr-42/1.6. Cardiology plans to take for heart cath today. Patient aware and agreeable. UOP adequate response to diuresis.     Heart Cath- right main- 100% occlusion, distal left circ- 70%. Previous stent in left main intact and patent. Optimize and continue medical management, and risk factor reduction recommended. 12/2. On RA during morning visit, c/o of occasional breathlessness. Home O2 eval performed. Patient qualified, consult to CM and order for home O2 therapy placed.  Patient on aspirin, atorvastatin, Coreg, Plavix, Imdur.  She will follow up with Dr. Matamoros her primary cardiologist in 2 weeks.  With diuresis patient's symptoms improved.  Patient seen and examined on day of discharge and stable for discharge home with oxygen therapy.               Goals of Care Treatment Preferences:  Code Status: Full Code      SDOH Screening:  The patient was screened for utility difficulties, food insecurity, transport difficulties, housing insecurity, and interpersonal safety and there were no concerns identified this admission.     Consults:   Consults (From admission, onward)          Status Ordering Provider     Inpatient consult to Social Work  Once        Provider:  (Not yet assigned)    Completed FEDERICO DAVID     Inpatient consult to Cardiology  Once        Provider:  Justin Rao MD    Completed VADIM PITTMAN     Inpatient consult to Registered Dietitian/Nutritionist  Once        Provider:  (Not yet assigned)    Completed SORAYA BURROUGHS            * Acute on chronic congestive heart failure  Presents with acute dyspnea and respiratory distress  "initially requiring NIPPV via BIPAP. CXR consistent with CHF and diuresed with positive response. Patient has been weaned to 3L nasal cannula. She does not take Lasix regularly due to kidney function.     Patient has  unknown type  heart failure that is Acute on chronic. On presentation their CHF was decompensated. Evidence of decompensated CHF on presentation includes: crackles on lung auscultation, paroxysmal nocturnal dyspnea (PND), dyspnea on exertion (CHANCE), and shortness of breath. The etiology of their decompensation is likely dietary indiscretion. Most recent BNP and echo results are listed below.  No results for input(s): "BNP" in the last 72 hours.    Latest ECHO  Results for orders placed during the hospital encounter of 11/29/24    Echo Saline Bubble? No    Interpretation Summary    Left Ventricle: The left ventricle is normal in size. Normal wall thickness. There is concentric hypertrophy. Regional wall motion abnormalities present. Septal motion is consistent with bundle branch block. There is normal systolic function with a visually estimated ejection fraction of 55 - 60%. Ejection fraction is approximately 55%. Grade II diastolic dysfunction.    Right Ventricle: Normal right ventricular cavity size. Wall thickness is normal. Systolic function is normal.    Left Atrium: Left atrium is severely dilated.    Aortic Valve: There is mild stenosis. Aortic valve area by VTI is 1.5 cm². Aortic valve peak velocity is 3.0 m/s. Mean gradient is 21.5 mmHg. The dimensionless index is 0.40. There is mild aortic regurgitation.    Mitral Valve: There is mild to moderate regurgitation.    IVC/SVC: Intermediate venous pressure at 8 mmHg.    Current Heart Failure Medications  , ,   , , Oral  , , Oral  , , Oral  carvediloL tablet 25 mg, 2 times daily, Oral  furosemide tablet 40 mg, Daily, Oral    Plan  - Monitor strict I&Os and daily weights.    - Place on telemetry  - Low sodium diet  - Place on fluid restriction of 1.5 " L.   - Cardiology has been consulted  - The patient's volume status is uncontrolled  -follow Echocardiogram results, diurese    11/30/24  Echocardiogram showed preserved EF, DD, and septal wall motion consistent with LBBB.  Patient is responding well to diuresis. Has diuresed 2.9L    Management as above    12/1/24  Responding well. Output 4.4L. repeat CXR pending. Reduce Lasix to daily    12/2  Heart cath scheduled for today. Continues with adequate UOP. Oxygen sats wnl.    Left arm cellulitis  Related to dog injury  Topical mupirocin  Doxycycline.     Redness clearing      Nonrheumatic aortic valve stenosis  Echocardiogram with evidence of aortic stenosis that is mild . The patient's most recent echocardiogram result is listed below. We will manage the valvular abnormality by diuresis    Echo Saline Bubble? No    Result Date: 11/29/2024    Left Ventricle: The left ventricle is normal in size. Normal wall   thickness. There is concentric hypertrophy. Regional wall motion   abnormalities present. Septal motion is consistent with bundle branch   block. There is normal systolic function with a visually estimated   ejection fraction of 55 - 60%. Ejection fraction is approximately 55%.   Grade II diastolic dysfunction.    Right Ventricle: Normal right ventricular cavity size. Wall thickness   is normal. Systolic function is normal.    Left Atrium: Left atrium is severely dilated.    Aortic Valve: There is mild stenosis. Aortic valve area by VTI is 1.5   cm². Aortic valve peak velocity is 3.0 m/s. Mean gradient is 21.5 mmHg.   The dimensionless index is 0.40. There is mild aortic regurgitation.    Mitral Valve: There is mild to moderate regurgitation.    IVC/SVC: Intermediate venous pressure at 8 mmHg.          Anemia of chronic disease  Anemia is likely due to chronic disease due to Chronic Kidney Disease. Most recent hemoglobin and hematocrit are listed below.  Recent Labs     11/30/24  0447 12/01/24  0636  12/02/24  0549   HGB 9.1* 9.2* 9.5*   HCT 27.3* 28.3* 29.5*       Plan  - Monitor serial CBC: Daily  - Transfuse PRBC if patient becomes hemodynamically unstable, symptomatic or H/H drops below 7/21.  - Patient has not received any PRBC transfusions to date  - Patient's anemia is currently stable      COPD (chronic obstructive pulmonary disease)  Patient's COPD is controlled currently.  Patient is currently off COPD Pathway. Continue scheduled inhalers Steroids and Supplemental oxygen and monitor respiratory status closely.     CKD (chronic kidney disease), stage III  Creatine stable for now. BMP reviewed- noted Estimated Creatinine Clearance: 29.1 mL/min (A) (based on SCr of 1.6 mg/dL (H)). according to latest data. Based on current GFR, CKD stage is stage 3 - GFR 30-59.    --Patient is followed by Dr. Hair  --Hold Farxiga now for proteinuria   --Kidney function at baseline.   --Monitor UOP and serial BMP and adjust therapy as needed.   --Renally dose meds. Avoid nephrotoxic medications and procedures.    11/30/24  Kidney function stable. Diurese    12/1/24  Kidney function improving.   Patient understands risk of contrast induced nephropathy with [facility]    Breast cancer  Left mastectomy with lumph node dissection in 6/2023  --takes Ibrance and Faslodex injection.   --Followed by Dr. Breaux    Primary hypertension  Patients blood pressure range in the last 24 hours was: BP  Min: 143/63  Max: 172/81.The patient's inpatient anti-hypertensive regimen is listed below:  Current Antihypertensives  , ,   , , Oral  , , Oral  carvediloL tablet 25 mg, 2 times daily, Oral  isosorbide mononitrate 24 hr tablet 30 mg, Daily, Oral  amLODIPine tablet 5 mg, Daily, Oral  furosemide tablet 40 mg, Daily, Oral    Plan  - BP is controlled, no changes needed to their regimen      LBBB (left bundle branch block)  Noted on EKG  --patient has a history of CAD  --followed by Dr. Gama  Cardiology consulted    11/30/23  Also noted on  Echocardiogram  Repeat today showed resolution of LBBB after diuresis  Cardiology will decide on need for LHC    12/2  Heart cath scheduled for 12/2.      CAD (coronary artery disease)  Patient with known CAD s/p stent placement. Will continue Statin and monitor for S/Sx of angina/ACS. Continue to monitor on telemetry.   --patient is followed by Dr. Brant Lundberg  --S/p PCI 10/2023    11/30/23   See plan for NSTEMI    NSTEMI (non-ST elevated myocardial infarction)  Recent Labs     11/29/24  2104 11/30/24  0447   TROPONINI 2.224* 1.635*     ?demand ischemia from CHF versus concern for cardiac ischemia  Follow trends  Echocardiogram pending  Heparin infusion initiated in ED  Cardiology input appreciated    11/30/23  Troponin trend consistent with NSTEM  Continue heparin infusion.   Cardiology will discuss need for heparin infusion  Continue ASA, Plavix, BB, and STATIN    12/1/24  Plans for LHC in AM    12/2  Heart cath scheduled for today    Acute respiratory failure with hypoxia  Patient with Hypoxic Respiratory failure which is Acute.  she is not on home oxygen. Supplemental oxygen was provided and noted-      .   Signs/symptoms of respiratory failure include- tachypnea, increased work of breathing, respiratory distress, and use of accessory muscles. Contributing diagnoses includes - CHF Labs and images were reviewed. Patient Has recent ABG, which has been reviewed. Will treat underlying causes and adjust management of respiratory failure as follows-   --Treat underlying CHF    12/1/24  Weaning. On 3L nasal cannula    O2 saturations adequate on home O2.      Final Active Diagnoses:    Diagnosis Date Noted POA    PRINCIPAL PROBLEM:  Acute on chronic congestive heart failure [I50.9] 11/29/2024 Yes    Left arm cellulitis [L03.114] 12/01/2024 Yes    Nonrheumatic aortic valve stenosis [I35.0] 11/30/2024 Yes    Acute respiratory failure with hypoxia [J96.01] 11/29/2024 Yes    NSTEMI (non-ST elevated myocardial  "infarction) [I21.4] 11/29/2024 Yes    CAD (coronary artery disease) [I25.10] 11/29/2024 Yes    LBBB (left bundle branch block) [I44.7] 11/29/2024 Yes    Primary hypertension [I10] 11/29/2024 Yes    Breast cancer [C50.919] 11/29/2024 Yes    CKD (chronic kidney disease), stage III [N18.30] 11/29/2024 Yes    COPD (chronic obstructive pulmonary disease) [J44.9] 11/29/2024 Yes    Anemia of chronic disease [D63.8] 11/29/2024 Yes      Problems Resolved During this Admission:       Discharged Condition: fair    Disposition: Home or Self Care    Follow Up:   Follow-up Information       Equipment, [facility]. Call.    Specialty: DME Provider  Why: Home Medical Equipment  Contact information:  [address] [place] [id number]  [phone]                           Patient Instructions:      OXYGEN FOR HOME USE     Order Specific Question Answer Comments   Liter Flow 2    Duration Continuous    Qualifying Test Performed at: Activity    Oxygen saturation at rest 92    Oxygen saturation with activity 88 RA   Oxygen saturation with activity on oxygen 89 on 2L   Portable mode: pulse dose acceptable    Mode: Portable concentrator    Route nasal cannula    Device: home concentrator    Length of need (in months): 99 mos    Patient condition with qualifying saturation CHF    Additional information: depended edema    Height: 5' 3" (1.6 m)    Weight: 76.9 kg (169 lb 8.5 oz)    Alternative treatment measures have been tried or considered and deemed clinically ineffective. Yes      Ambulatory referral/consult to [facility]   Standing Status: Future   Referral Priority: Routine Referral Type: Consultation   Referral Reason: Specialty Services Required   Requested Specialty: Cardiology   Number of Visits Requested: 1     Ambulatory referral/consult to [facility]   Standing Status: Future   Referral Priority: Routine Referral Type: Consultation   Referral Reason: Specialty Services " Required   Number of Visits Requested: 1     Diet Cardiac     Call MD for:  temperature >100.4     Call MD for:  persistent nausea and vomiting or diarrhea     Call MD for:  severe uncontrolled pain     Call MD for:  redness, tenderness, or signs of infection (pain, swelling, redness, odor or green/yellow discharge around incision site)     Call MD for:  difficulty breathing or increased cough     Call MD for:  severe persistent headache     Call MD for:  worsening rash     Call MD for:  persistent dizziness, light-headedness, or visual disturbances     Call MD for:  increased confusion or weakness     Activity as tolerated       Significant Diagnostic Studies: Labs: All labs within the past 24 hours have been reviewed  Imaging Results              X-Ray Chest AP Portable (Final result)  Result time 11/29/24 07:35:57      Final result by Sunil Harvey MD (11/29/24 07:35:57)                   Impression:      As above.      Electronically signed by: Sunil Harvey  Date:    11/29/2024  Time:    07:35               Narrative:    EXAMINATION:  XR CHEST AP PORTABLE    CLINICAL HISTORY:  Asthma;.    TECHNIQUE:  Single frontal portable view of the chest was performed.    COMPARISON:  None    FINDINGS:  Moderate to severe cardiomegaly.    Pulmonary vascular congestion with interstitial edema suspicious for heart failure.  Slightly more confluent opacification right perihilar region raising question of superimposed infection/pneumonia.  No pleural effusion or pneumothorax.                                      Pending Diagnostic Studies:       None           Medications:  Reconciled Home Medications:      Medication List        START taking these medications      amLODIPine 5 MG tablet  Commonly known as: NORVASC  Take 1 tablet (5 mg total) by mouth once daily.     benzonatate 100 MG capsule  Commonly known as: TESSALON  Take 1 capsule (100 mg total) by mouth every 8 (eight) hours as needed for Cough.            CONTINUE  taking these medications      albuterol-ipratropium 2.5 mg-0.5 mg/3 mL nebulizer solution  Commonly known as: DUO-NEB  3 ML  .     ALPRAZolam 0.5 MG tablet  Commonly known as: XANAX  Take 0.5 mg by mouth daily as needed.     aspirin 81 MG EC tablet  Commonly known as: ECOTRIN  Take 1 tablet by mouth every morning.     azelastine 137 mcg (0.1 %) nasal spray  Commonly known as: ASTELIN  2 sprays.     carvediloL 25 MG tablet  Commonly known as: COREG  TAKE ONE TABLET BY MOUTH TWICE DAILY THANK YOU     cetirizine 10 MG tablet  Commonly known as: ZYRTEC  Take 10 mg by mouth.     clopidogreL 75 mg tablet  Commonly known as: PLAVIX  Take 75 mg by mouth.     coQ10 (ubiquinol) 100 mg Cap  Take 1 capsule by mouth.     dextroamphetamine-amphetamine 10 mg Tab  TAKE ONE TABLET BY MOUTH EVERY MORNING THANK YOU     epoetin soha 10,000 unit/mL injection  Commonly known as: PROCRIT  Inject 10,000 Units into the skin.     FARXIGA 10 mg tablet  Generic drug: dapagliflozin propanediol  Take 10 mg by mouth.     furosemide 40 MG tablet  Commonly known as: LASIX  Take 40 mg by mouth.     gabapentin 300 MG capsule  Commonly known as: NEURONTIN  TAKE ONE CAPSULE BY MOUTH EVERY NIGHT AT BEDTIME THANK YOU     HYDROcodone-acetaminophen  mg per tablet  Commonly known as: NORCO  Take by mouth.     IBRANCE 100 mg Tab  Generic drug: palbociclib  Take 100 mg by mouth.     isosorbide mononitrate 30 MG 24 hr tablet  Commonly known as: IMDUR  Take 30 mg by mouth.     pantoprazole 40 MG tablet  Commonly known as: PROTONIX  TAKE ONE TABLET BY MOUTH DAILY THANK YOU     promethazine-dextromethorphan 6.25-15 mg/5 mL Syrp  Commonly known as: PROMETHAZINE-DM  Take by mouth every 8 (eight) hours as needed.     rosuvastatin 20 MG tablet  Commonly known as: CRESTOR  Take 20 mg by mouth.     TRELEGY ELLIPTA 100-62.5-25 mcg Dsdv  Generic drug: fluticasone-umeclidin-vilanter  Inhale 1 puff into the lungs.     VERQUVO 5 mg Tab  Generic drug: vericiguat  Take  1 tablet by mouth.            ASK your doctor about these medications      calcium-vitamin D 250 mg-2.5 mcg (100 unit) per tablet  Take 1 tablet by mouth once daily.     doxycycline 100 MG tablet  Commonly known as: VIBRA-TABS  Take 1 tablet (100 mg total) by mouth every 12 (twelve) hours. for 3 days  Ask about: Should I take this medication?     predniSONE 20 MG tablet  Commonly known as: DELTASONE  Take 1 tablet (20 mg total) by mouth once daily. for 3 days  Ask about: Should I take this medication?              Indwelling Lines/Drains at time of discharge:   Lines/Drains/Airways       None                   Time spent on the discharge of patient: 54 minutes         Iliana Olmedo MD  Department of Hospital Medicine  [facility] - Telemetry ([facility])

## 2024-12-16 ENCOUNTER — OFFICE VISIT (OUTPATIENT)
Dept: HOME HEALTH SERVICES | Facility: CLINIC | Age: 79
End: 2024-12-16
Payer: MEDICARE

## 2024-12-16 DIAGNOSIS — J96.01 ACUTE HYPOXEMIC RESPIRATORY FAILURE: ICD-10-CM

## 2024-12-16 DIAGNOSIS — M10.9 ACUTE GOUT INVOLVING TOE OF LEFT FOOT, UNSPECIFIED CAUSE: Primary | ICD-10-CM

## 2024-12-16 DIAGNOSIS — I50.9 CHF (CONGESTIVE HEART FAILURE): ICD-10-CM

## 2024-12-16 DIAGNOSIS — I25.10 CORONARY ARTERY DISEASE, UNSPECIFIED VESSEL OR LESION TYPE, UNSPECIFIED WHETHER ANGINA PRESENT, UNSPECIFIED WHETHER NATIVE OR TRANSPLANTED HEART: ICD-10-CM

## 2024-12-16 PROCEDURE — 99495 TRANSJ CARE MGMT MOD F2F 14D: CPT | Mod: S$GLB,,, | Performed by: NURSE PRACTITIONER

## 2024-12-16 PROCEDURE — 1159F MED LIST DOCD IN RCRD: CPT | Mod: CPTII,S$GLB,, | Performed by: NURSE PRACTITIONER

## 2024-12-16 PROCEDURE — 1160F RVW MEDS BY RX/DR IN RCRD: CPT | Mod: CPTII,S$GLB,, | Performed by: NURSE PRACTITIONER

## 2024-12-16 PROCEDURE — 1111F DSCHRG MED/CURRENT MED MERGE: CPT | Mod: CPTII,S$GLB,, | Performed by: NURSE PRACTITIONER

## 2024-12-16 RX ORDER — PREDNISONE 20 MG/1
20 TABLET ORAL DAILY
Qty: 3 TABLET | Refills: 0 | Status: SHIPPED | OUTPATIENT
Start: 2024-12-16 | End: 2024-12-19

## 2024-12-16 NOTE — PROGRESS NOTES
[facility]  Transitional Care Management (TCM) Home Visit    Encounter Provider: Ksenia Avendaño   PCP: Faraz Nguyễn MD  Consult Requested By: Dr. Iliana Mandel  Admit Date: 11/29/24   IP Discharge Date: 12/3/24  Hospital Length of Stay:RRHLOS@ days  Days since discharge (from IP or SNF): 13 days   [facility] Contact Note:   Hospital Diagnosis: CHF (congestive heart failure) [I50.9];Acute hypoxemic respiratory failure [J96.01];Coronary artery disease, unspecified vessel or lesion type, unspecified whether angina present, unspecified whether native or transplanted heart [I25.10]     HISTORY OF PRESENT ILLNESS      Patient ID: Abigail Babb is a 79 y.o. female was recently admitted to the hospital, this is their TCM encounter.    Hospital Course Synopsis:    1) 79 y.o. female patient with a PMHx of CHF and COPD who presents to the Emergency Department for evaluation of difficulty breathing which began around 4 AM this morning. Patient is not normally on oxygen at home. Her oxygen sats went from 68 before BiPAP to 100 post-BiPAP. Per ambulance service, the patient had diminished bilateral breath sounds.    2) Developments since hospitalization and current needs: left foot painful and edematous similar to how right foot was previous to discharge from the hospital  3) Please confirm dates of admit, discharge, LOS above are correct: verified with the patient and MR    DECISION MAKING TODAY       Assessment & Plan:  1. Acute gout involving toe of left foot, unspecified cause  Comments:  prednisone 20mg PO QD x 3 days    2. CHF (congestive heart failure)  Comments:  continue home medications as prescribed  follow up with cardiology as instructed  Orders:  -     [facility]    3. Acute hypoxemic respiratory failure  Comments:  spot check O2 on visit 94% room air  Orders:  -     Ambulatory referral/consult to [facility]    4. Coronary artery  disease, unspecified vessel or lesion type, unspecified whether angina present, unspecified whether native or transplanted heart  -     Ambulatory referral/consult to [facility]    Other orders  -     predniSONE (DELTASONE) 20 MG tablet; Take 1 tablet (20 mg total) by mouth once daily. for 3 days  Dispense: 3 tablet; Refill: 0         Medication List on Discharge:     Medication List            Accurate as of December 16, 2024  5:37 PM. If you have any questions, ask your nurse or doctor.                START taking these medications      predniSONE 20 MG tablet  Commonly known as: DELTASONE  Take 1 tablet (20 mg total) by mouth once daily. for 3 days  Started by: Ksenia Avendaño NP            CONTINUE taking these medications      albuterol-ipratropium 2.5 mg-0.5 mg/3 mL nebulizer solution  Commonly known as: DUO-NEB  3 ML  .     ALPRAZolam 0.5 MG tablet  Commonly known as: XANAX  Take 0.5 mg by mouth daily as needed.     amLODIPine 5 MG tablet  Commonly known as: NORVASC  Take 1 tablet (5 mg total) by mouth once daily.     aspirin 81 MG EC tablet  Commonly known as: ECOTRIN  Take 1 tablet by mouth every morning.     azelastine 137 mcg (0.1 %) nasal spray  Commonly known as: ASTELIN  2 sprays.     benzonatate 100 MG capsule  Commonly known as: TESSALON  Take 1 capsule (100 mg total) by mouth every 8 (eight) hours as needed for Cough.     calcium-vitamin D 250 mg-2.5 mcg (100 unit) per tablet  Take 1 tablet by mouth once daily.     carvediloL 25 MG tablet  Commonly known as: COREG  TAKE ONE TABLET BY MOUTH TWICE DAILY THANK YOU     cetirizine 10 MG tablet  Commonly known as: ZYRTEC  Take 10 mg by mouth.     clopidogreL 75 mg tablet  Commonly known as: PLAVIX  Take 75 mg by mouth.     coQ10 (ubiquinol) 100 mg Cap  Take 1 capsule by mouth.     dextroamphetamine-amphetamine 10 mg Tab  TAKE ONE TABLET BY MOUTH EVERY MORNING THANK YOU     epoetin soha 10,000 unit/mL injection  Commonly known as:  PROCRIT  Inject 10,000 Units into the skin.     FARXIGA 10 mg tablet  Generic drug: dapagliflozin propanediol  Take 10 mg by mouth.     furosemide 40 MG tablet  Commonly known as: LASIX  Take 40 mg by mouth.     gabapentin 300 MG capsule  Commonly known as: NEURONTIN  TAKE ONE CAPSULE BY MOUTH EVERY NIGHT AT BEDTIME THANK YOU     HYDROcodone-acetaminophen  mg per tablet  Commonly known as: NORCO  Take by mouth.     IBRANCE 100 mg Tab  Generic drug: palbociclib  Take 100 mg by mouth.     isosorbide mononitrate 30 MG 24 hr tablet  Commonly known as: IMDUR  Take 30 mg by mouth.     pantoprazole 40 MG tablet  Commonly known as: PROTONIX  TAKE ONE TABLET BY MOUTH DAILY THANK YOU     promethazine-dextromethorphan 6.25-15 mg/5 mL Syrp  Commonly known as: PROMETHAZINE-DM  Take by mouth every 8 (eight) hours as needed.     rosuvastatin 20 MG tablet  Commonly known as: CRESTOR  Take 20 mg by mouth.     TRELEGY ELLIPTA 100-62.5-25 mcg Dsdv  Generic drug: fluticasone-umeclidin-vilanter  Inhale 1 puff into the lungs.     VERQUVO 5 mg Tab  Generic drug: vericiguat  Take 1 tablet by mouth.              Medication Reconciliation:  Were medications changed on discharge? Yes  Were medications in the home? Yes  Is the patient taking the medications as directed? Yes  Does the patient understand the medications and changes? Yes  Does updated med list accurately reflects meds patient is currently taking? Yes    ENVIRONMENT OF CARE      Family and/or Caregiver present at visit?  Yes  Name of Caregiver: Que  History provided by: patient    Advance Care Planning   Advanced Care Planning Status:  Patient has had an ACP conversation  Living Will: No  Power of : No  [place]: No    Does Caregiver have HCPoA: No  Changes today: N/A  Is patient hospice appropriate: No  (If needed, use PPS <30 or FAST score >7)  Was referral to hospice placed: No       Impression upon entering the home:  Physical Dwelling: single family home    Appearance of home environment: cleaniness: clean, walking pathways: clear, lighting: adequate, and home structure: sound structure  Functional Status: independent  Mobility: ambulatory  Nutritional access: adequate intake and access  Home Health: No, and does not need it at this time   DME/Supplies: none     Diagnostic tests reviewed/disposition: No diagnosic tests pending after this hospitalization.  Disease/illness education: CHF and Gout  Establishment or re-establishment of referral orders for community resources: No other necessary community resources.   Discussion with other health care providers: No discussion with other health care providers necessary.   Does patient have a PCP at [facility]? Yes   Repatriation plan with PCP? follow-up with PCP within 30d   Does patient have an ostomy (ileostomy, colostomy, suprapubic catheter, nephrostomy tube, tracheostomy, PEG tube, pleurex catheter, cholecystostomy, etc)? No  Were BPAs reviewed? Yes    Social History     Socioeconomic History    Marital status:    Tobacco Use    Smoking status: Former     Types: Cigarettes    Smokeless tobacco: Never   Substance and Sexual Activity    Alcohol use: Not Currently    Drug use: Never     Social Drivers of Health     Financial Resource Strain: Low Risk  (11/29/2024)    Overall Financial Resource Strain (CARDIA)     Difficulty of Paying Living Expenses: Not hard at all   Food Insecurity: No Food Insecurity (11/29/2024)    Hunger Vital Sign     Worried About Running Out of Food in the Last Year: Never true     Ran Out of Food in the Last Year: Never true   Transportation Needs: No Transportation Needs (11/29/2024)    TRANSPORTATION NEEDS     Transportation : No   Stress: No Stress Concern Present (11/29/2024)    [employer] [place] [employer] - Occupational Stress Questionnaire     Feeling of Stress : Only a little   Housing Stability: Low Risk  (11/29/2024)    Housing Stability Vital Sign     Unable to Pay for  Housing in the Last Year: No     Homeless in the Last Year: No       OBJECTIVE:     Vital Signs:  Vitals:    12/16/24 1020   BP: (!) (P) 108/58   Pulse: (P) 61   Resp: (P) 20   Temp: (P) 98.1 °F (36.7 °C)       Review of Systems   Constitutional: Negative.    HENT: Negative.     Eyes: Negative.    Respiratory: Negative.     Cardiovascular: Negative.    Gastrointestinal: Negative.    Endocrine: Negative.    Genitourinary: Negative.    Musculoskeletal:  Positive for arthralgias.   Skin: Negative.    Neurological: Negative.    Psychiatric/Behavioral: Negative.         Physical Exam:  Physical Exam  Vitals reviewed.   Constitutional:       General: She is not in acute distress.     Appearance: She is not ill-appearing.   HENT:      Head: Normocephalic and atraumatic.      Nose: Nose normal.      Mouth/Throat:      Mouth: Mucous membranes are moist.      Pharynx: Oropharynx is clear.   Cardiovascular:      Rate and Rhythm: Normal rate and regular rhythm.      Heart sounds: Murmur heard.   Pulmonary:      Effort: Pulmonary effort is normal.      Breath sounds: Normal breath sounds.   Abdominal:      General: Bowel sounds are normal.      Palpations: Abdomen is soft.   Musculoskeletal:         General: Swelling (left foot with warmth to dorsal aspect of foot) and tenderness (left foot and great toe) present.      Cervical back: Neck supple.   Skin:     General: Skin is warm and dry.      Capillary Refill: Capillary refill takes less than 2 seconds.   Neurological:      Mental Status: She is alert and oriented to person, place, and time.   Psychiatric:         Mood and Affect: Mood normal.         Behavior: Behavior normal.         Thought Content: Thought content normal.         Judgment: Judgment normal.         INSTRUCTIONS FOR PATIENT:     Scheduled Follow-up, Appts Reviewed with Modifications if Needed: Yes  No future appointments.    Encounter for Medical Follow-Up and Medication Review  - [facility] to  schedule follow-up visit with patient in 4 weeks or PRN     Patient Instructions Given:  - Continue all medications, treatments and therapies as ordered.   - Follow all instructions, recommendations as discussed.  - Maintain Safety Precautions at all times.  - Attend all medical appointments as scheduled.  - For worsening symptoms: call Primary Care Physician or Nurse Practitioner.  - For emergencies, call 911 or immediately report to the nearest emergency room          Signature: Ksenia Avendaño NP    Transition of Care Visit:  I have reviewed and updated the history and problem list.  I have reconciled the medication list.  I have discussed the hospitalization and current medical issues, prognosis and plans with the patient/family.

## 2025-05-23 ENCOUNTER — HOSPITAL ENCOUNTER (INPATIENT)
Facility: HOSPITAL | Age: 80
LOS: 2 days | Discharge: HOME | DRG: 291 | End: 2025-05-26
Attending: EMERGENCY MEDICINE | Admitting: FAMILY MEDICINE
Payer: MEDICARE

## 2025-05-23 DIAGNOSIS — I50.9 CHF (CONGESTIVE HEART FAILURE): ICD-10-CM

## 2025-05-23 DIAGNOSIS — I50.33 ACUTE ON CHRONIC DIASTOLIC CONGESTIVE HEART FAILURE: ICD-10-CM

## 2025-05-23 DIAGNOSIS — J96.01 ACUTE RESPIRATORY FAILURE WITH HYPOXIA: ICD-10-CM

## 2025-05-23 DIAGNOSIS — R06.02 SOB (SHORTNESS OF BREATH): ICD-10-CM

## 2025-05-23 DIAGNOSIS — I25.118 CORONARY ARTERY DISEASE OF NATIVE ARTERY OF NATIVE HEART WITH STABLE ANGINA PECTORIS: ICD-10-CM

## 2025-05-23 DIAGNOSIS — I10 HYPERTENSION, UNSPECIFIED TYPE: ICD-10-CM

## 2025-05-23 DIAGNOSIS — R07.9 CHEST PAIN: ICD-10-CM

## 2025-05-23 DIAGNOSIS — M10.9 ACUTE GOUT OF LEFT FOOT, UNSPECIFIED CAUSE: ICD-10-CM

## 2025-05-23 DIAGNOSIS — I50.43 ACUTE ON CHRONIC COMBINED SYSTOLIC AND DIASTOLIC CONGESTIVE HEART FAILURE: Primary | ICD-10-CM

## 2025-05-23 DIAGNOSIS — M79.672 LEFT FOOT PAIN: ICD-10-CM

## 2025-05-23 DIAGNOSIS — I10 PRIMARY HYPERTENSION: ICD-10-CM

## 2025-05-23 PROCEDURE — 93010 ELECTROCARDIOGRAM REPORT: CPT | Mod: ,,, | Performed by: INTERNAL MEDICINE

## 2025-05-23 PROCEDURE — 93005 ELECTROCARDIOGRAM TRACING: CPT

## 2025-05-23 PROCEDURE — 99291 CRITICAL CARE FIRST HOUR: CPT

## 2025-05-23 RX ORDER — NITROGLYCERIN 20 MG/G
1 OINTMENT TOPICAL
Status: COMPLETED | OUTPATIENT
Start: 2025-05-24 | End: 2025-05-24

## 2025-05-23 RX ORDER — IPRATROPIUM BROMIDE AND ALBUTEROL SULFATE 2.5; .5 MG/3ML; MG/3ML
SOLUTION RESPIRATORY (INHALATION)
Status: DISPENSED
Start: 2025-05-23 | End: 2025-05-24

## 2025-05-24 PROBLEM — J44.1 ACUTE EXACERBATION OF CHRONIC OBSTRUCTIVE PULMONARY DISEASE (COPD): Status: ACTIVE | Noted: 2024-11-29

## 2025-05-24 PROBLEM — D64.9 ANEMIA: Status: ACTIVE | Noted: 2024-11-29

## 2025-05-24 LAB
ABSOLUTE EOSINOPHIL (OHS): 0.01 K/UL
ABSOLUTE EOSINOPHIL (OHS): 0.03 K/UL
ABSOLUTE MONOCYTE (OHS): 0.14 K/UL (ref 0.3–1)
ABSOLUTE MONOCYTE (OHS): 0.35 K/UL (ref 0.3–1)
ABSOLUTE NEUTROPHIL COUNT (OHS): 2.67 K/UL (ref 1.8–7.7)
ABSOLUTE NEUTROPHIL COUNT (OHS): 3.14 K/UL (ref 1.8–7.7)
ALBUMIN SERPL BCP-MCNC: 3.6 G/DL (ref 3.5–5.2)
ALLENS TEST: ABNORMAL
ALP SERPL-CCNC: 94 UNIT/L (ref 40–150)
ALT SERPL W/O P-5'-P-CCNC: 18 UNIT/L (ref 10–44)
ANION GAP (OHS): 12 MMOL/L (ref 8–16)
ANION GAP (OHS): 13 MMOL/L (ref 8–16)
AORTIC ROOT ANNULUS: 3.1 CM
AST SERPL-CCNC: 21 UNIT/L (ref 11–45)
AV INDEX (PROSTH): 0.42
AV MEAN GRADIENT: 21 MMHG
AV PEAK GRADIENT: 36 MMHG
AV REGURGITATION PRESSURE HALF TIME: 742 MS
AV VALVE AREA BY VELOCITY RATIO: 1.2 CM²
AV VALVE AREA: 1.2 CM²
AV VELOCITY RATIO: 0.43
BACTERIA #/AREA URNS AUTO: NORMAL /HPF
BASOPHILS # BLD AUTO: 0 K/UL
BASOPHILS # BLD AUTO: 0.03 K/UL
BASOPHILS NFR BLD AUTO: 0 %
BASOPHILS NFR BLD AUTO: 0.8 %
BILIRUB SERPL-MCNC: 0.6 MG/DL (ref 0.1–1)
BILIRUB UR QL STRIP.AUTO: NEGATIVE
BNP SERPL-MCNC: 1079 PG/ML (ref 0–99)
BSA FOR ECHO PROCEDURE: 1.88 M2
BUN SERPL-MCNC: 32 MG/DL (ref 8–23)
BUN SERPL-MCNC: 33 MG/DL (ref 8–23)
CALCIUM SERPL-MCNC: 10.1 MG/DL (ref 8.7–10.5)
CALCIUM SERPL-MCNC: 10.2 MG/DL (ref 8.7–10.5)
CHLORIDE SERPL-SCNC: 106 MMOL/L (ref 95–110)
CHLORIDE SERPL-SCNC: 106 MMOL/L (ref 95–110)
CLARITY UR: CLEAR
CO2 SERPL-SCNC: 19 MMOL/L (ref 23–29)
CO2 SERPL-SCNC: 19 MMOL/L (ref 23–29)
COLOR UR AUTO: YELLOW
CREAT SERPL-MCNC: 1.7 MG/DL (ref 0.5–1.4)
CREAT SERPL-MCNC: 1.7 MG/DL (ref 0.5–1.4)
CV ECHO LV RWT: 0.64 CM
DELSYS: ABNORMAL
DOP CALC AO PEAK VEL: 3 M/S
DOP CALC AO VTI: 79.7 CM
DOP CALC LVOT AREA: 2.8 CM2
DOP CALC LVOT DIAMETER: 1.9 CM
DOP CALC LVOT PEAK VEL: 1.3 M/S
DOP CALC LVOT STROKE VOLUME: 94.9 CM3
DOP CALC RVOT PEAK VEL: 1.33 M/S
DOP CALC RVOT VTI: 30.6 CM
DOP CALCLVOT PEAK VEL VTI: 33.5 CM
E WAVE DECELERATION TIME: 271 MSEC
E/A RATIO: 0.84
E/E' RATIO: 19 M/S
ECHO LV POSTERIOR WALL: 1.5 CM (ref 0.6–1.1)
EJECTION FRACTION: 55 %
ERYTHROCYTE [DISTWIDTH] IN BLOOD BY AUTOMATED COUNT: 15.9 % (ref 11.5–14.5)
ERYTHROCYTE [DISTWIDTH] IN BLOOD BY AUTOMATED COUNT: 15.9 % (ref 11.5–14.5)
FERRITIN SERPL-MCNC: 386.2 NG/ML (ref 20–300)
FOLATE SERPL-MCNC: 13.8 NG/ML (ref 4–24)
FRACTIONAL SHORTENING: 36.2 % (ref 28–44)
GFR SERPLBLD CREATININE-BSD FMLA CKD-EPI: 31 ML/MIN/1.73/M2
GFR SERPLBLD CREATININE-BSD FMLA CKD-EPI: 31 ML/MIN/1.73/M2
GLUCOSE SERPL-MCNC: 176 MG/DL (ref 70–110)
GLUCOSE SERPL-MCNC: 212 MG/DL (ref 70–110)
GLUCOSE UR QL STRIP: ABNORMAL
HCO3 UR-SCNC: 23.3 MMOL/L (ref 24–28)
HCT VFR BLD AUTO: 29.2 % (ref 37–48.5)
HCT VFR BLD AUTO: 29.6 % (ref 37–48.5)
HGB BLD-MCNC: 10 GM/DL (ref 12–16)
HGB BLD-MCNC: 9.9 GM/DL (ref 12–16)
HGB UR QL STRIP: NEGATIVE
HOLD SPECIMEN: NORMAL
HYALINE CASTS UR QL AUTO: 1 /LPF (ref 0–1)
IMM GRANULOCYTES # BLD AUTO: 0.01 K/UL (ref 0–0.04)
IMM GRANULOCYTES # BLD AUTO: 0.02 K/UL (ref 0–0.04)
IMM GRANULOCYTES NFR BLD AUTO: 0.3 % (ref 0–0.5)
IMM GRANULOCYTES NFR BLD AUTO: 0.6 % (ref 0–0.5)
INFLUENZA A MOLECULAR (OHS): NEGATIVE
INFLUENZA B MOLECULAR (OHS): NEGATIVE
INTERVENTRICULAR SEPTUM: 1.5 CM (ref 0.6–1.1)
IRON SATN MFR SERPL: 9 % (ref 20–50)
IRON SERPL-MCNC: 26 UG/DL (ref 30–160)
IVC DIAMETER: 1.64 CM
IVRT: 118 MSEC
KETONES UR QL STRIP: NEGATIVE
LA MAJOR: 5.3 CM
LA MINOR: 5.8 CM
LACTATE SERPL-SCNC: 1 MMOL/L (ref 0.5–2.2)
LEFT ATRIUM AREA SYSTOLIC (APICAL 2 CHAMBER): 20.37 CM2
LEFT ATRIUM AREA SYSTOLIC (APICAL 4 CHAMBER): 21.71 CM2
LEFT ATRIUM SIZE: 3.8 CM
LEFT ATRIUM VOLUME INDEX MOD: 37 ML/M2
LEFT ATRIUM VOLUME MOD: 68 ML
LEFT INTERNAL DIMENSION IN SYSTOLE: 3 CM (ref 2.1–4)
LEFT VENTRICLE DIASTOLIC VOLUME INDEX: 55.74 ML/M2
LEFT VENTRICLE DIASTOLIC VOLUME: 102 ML
LEFT VENTRICLE END SYSTOLIC VOLUME APICAL 2 CHAMBER: 64.61 ML
LEFT VENTRICLE END SYSTOLIC VOLUME APICAL 4 CHAMBER: 68.8 ML
LEFT VENTRICLE MASS INDEX: 160.7 G/M2
LEFT VENTRICLE SYSTOLIC VOLUME INDEX: 19.7 ML/M2
LEFT VENTRICLE SYSTOLIC VOLUME: 36 ML
LEFT VENTRICULAR INTERNAL DIMENSION IN DIASTOLE: 4.7 CM (ref 3.5–6)
LEFT VENTRICULAR MASS: 294.1 G
LEUKOCYTE ESTERASE UR QL STRIP: NEGATIVE
LV LATERAL E/E' RATIO: 21.5 M/S
LV SEPTAL E/E' RATIO: 17.2 M/S
LVED V (TEICH): 101.79 ML
LVES V (TEICH): 35.64 ML
LVOT MG: 4.2 MMHG
LVOT MV: 1 CM/S
LYMPHOCYTES # BLD AUTO: 0.3 K/UL (ref 1–4.8)
LYMPHOCYTES # BLD AUTO: 0.45 K/UL (ref 1–4.8)
MCH RBC QN AUTO: 35.6 PG (ref 27–31)
MCH RBC QN AUTO: 36.1 PG (ref 27–31)
MCHC RBC AUTO-ENTMCNC: 33.8 G/DL (ref 32–36)
MCHC RBC AUTO-ENTMCNC: 33.9 G/DL (ref 32–36)
MCV RBC AUTO: 105 FL (ref 82–98)
MCV RBC AUTO: 107 FL (ref 82–98)
MICROSCOPIC COMMENT: NORMAL
MV PEAK A VEL: 1.02 M/S
MV PEAK E VEL: 0.86 M/S
NITRITE UR QL STRIP: NEGATIVE
NUCLEATED RBC (/100WBC) (OHS): 0 /100 WBC
NUCLEATED RBC (/100WBC) (OHS): 0 /100 WBC
OHS CV RV/LV RATIO: 0.77 CM
PCO2 BLDA: 40.7 MMHG (ref 35–45)
PH SMN: 7.37 [PH] (ref 7.35–7.45)
PH UR STRIP: 6 [PH]
PISA AR MAX VEL: 3.53 M/S
PLATELET # BLD AUTO: 154 K/UL (ref 150–450)
PLATELET # BLD AUTO: 156 K/UL (ref 150–450)
PMV BLD AUTO: 11.1 FL (ref 9.2–12.9)
PMV BLD AUTO: 11.6 FL (ref 9.2–12.9)
PO2 BLDA: 63 MMHG (ref 80–100)
POC BE: -2 MMOL/L (ref -2–2)
POC SATURATED O2: 91 % (ref 95–100)
POTASSIUM SERPL-SCNC: 3.7 MMOL/L (ref 3.5–5.1)
POTASSIUM SERPL-SCNC: 3.8 MMOL/L (ref 3.5–5.1)
PROT SERPL-MCNC: 7.3 GM/DL (ref 6–8.4)
PROT UR QL STRIP: ABNORMAL
PV MEAN GRADIENT: 4 MMHG
PV MV: 1.08 M/S
PV PEAK GRADIENT: 11 MMHG
PV PEAK VELOCITY: 1.66 M/S
RA MAJOR: 5.34 CM
RA PRESSURE ESTIMATED: 3 MMHG
RBC # BLD AUTO: 2.77 M/UL (ref 4–5.4)
RBC # BLD AUTO: 2.78 M/UL (ref 4–5.4)
RBC #/AREA URNS AUTO: 0 /HPF (ref 0–4)
RELATIVE EOSINOPHIL (OHS): 0.3 %
RELATIVE EOSINOPHIL (OHS): 0.8 %
RELATIVE LYMPHOCYTE (OHS): 12.7 % (ref 18–48)
RELATIVE LYMPHOCYTE (OHS): 8.3 % (ref 18–48)
RELATIVE MONOCYTE (OHS): 3.9 % (ref 4–15)
RELATIVE MONOCYTE (OHS): 9.9 % (ref 4–15)
RELATIVE NEUTROPHIL (OHS): 75.5 % (ref 38–73)
RELATIVE NEUTROPHIL (OHS): 86.9 % (ref 38–73)
RIGHT VENTRICLE DIASTOLIC BASEL DIMENSION: 3.6 CM
RIGHT VENTRICULAR END-DIASTOLIC DIMENSION: 3.55 CM
SAMPLE: ABNORMAL
SARS-COV-2 RDRP RESP QL NAA+PROBE: NEGATIVE
SITE: ABNORMAL
SODIUM SERPL-SCNC: 137 MMOL/L (ref 136–145)
SODIUM SERPL-SCNC: 138 MMOL/L (ref 136–145)
SP GR UR STRIP: 1.01
STJ: 3.1 CM
TDI LATERAL: 0.04 M/S
TDI SEPTAL: 0.05 M/S
TDI: 0.05 M/S
TIBC SERPL-MCNC: 302 UG/DL (ref 250–450)
TRANSFERRIN SERPL-MCNC: 204 MG/DL (ref 200–375)
TRICUSPID ANNULAR PLANE SYSTOLIC EXCURSION: 1.8 CM
TROPONIN I SERPL DL<=0.01 NG/ML-MCNC: 0.04 NG/ML
URATE SERPL-MCNC: 6.8 MG/DL (ref 2.4–5.7)
UROBILINOGEN UR STRIP-ACNC: NEGATIVE EU/DL
VIT B12 SERPL-MCNC: 600 PG/ML (ref 210–950)
WBC # BLD AUTO: 3.54 K/UL (ref 3.9–12.7)
WBC # BLD AUTO: 3.61 K/UL (ref 3.9–12.7)
WBC #/AREA URNS AUTO: 2 /HPF (ref 0–5)
YEAST UR QL AUTO: NORMAL /HPF
Z-SCORE OF LEFT VENTRICULAR DIMENSION IN END DIASTOLE: -0.76
Z-SCORE OF LEFT VENTRICULAR DIMENSION IN END SYSTOLE: -0.34

## 2025-05-24 PROCEDURE — 81003 URINALYSIS AUTO W/O SCOPE: CPT | Performed by: EMERGENCY MEDICINE

## 2025-05-24 PROCEDURE — 83880 ASSAY OF NATRIURETIC PEPTIDE: CPT | Performed by: EMERGENCY MEDICINE

## 2025-05-24 PROCEDURE — 21400001 HC TELEMETRY ROOM

## 2025-05-24 PROCEDURE — 94640 AIRWAY INHALATION TREATMENT: CPT

## 2025-05-24 PROCEDURE — 25000242 PHARM REV CODE 250 ALT 637 W/ HCPCS: Performed by: EMERGENCY MEDICINE

## 2025-05-24 PROCEDURE — 25000242 PHARM REV CODE 250 ALT 637 W/ HCPCS

## 2025-05-24 PROCEDURE — 25000003 PHARM REV CODE 250: Performed by: EMERGENCY MEDICINE

## 2025-05-24 PROCEDURE — 25000003 PHARM REV CODE 250: Performed by: NURSE PRACTITIONER

## 2025-05-24 PROCEDURE — 25000003 PHARM REV CODE 250

## 2025-05-24 PROCEDURE — 63600175 PHARM REV CODE 636 W HCPCS: Mod: JZ,TB | Performed by: FAMILY MEDICINE

## 2025-05-24 PROCEDURE — 27000221 HC OXYGEN, UP TO 24 HOURS

## 2025-05-24 PROCEDURE — 99900035 HC TECH TIME PER 15 MIN (STAT)

## 2025-05-24 PROCEDURE — 25000242 PHARM REV CODE 250 ALT 637 W/ HCPCS: Performed by: FAMILY MEDICINE

## 2025-05-24 PROCEDURE — 84484 ASSAY OF TROPONIN QUANT: CPT | Performed by: EMERGENCY MEDICINE

## 2025-05-24 PROCEDURE — 84550 ASSAY OF BLOOD/URIC ACID: CPT | Performed by: EMERGENCY MEDICINE

## 2025-05-24 PROCEDURE — 87502 INFLUENZA DNA AMP PROBE: CPT | Performed by: EMERGENCY MEDICINE

## 2025-05-24 PROCEDURE — 82607 VITAMIN B-12: CPT | Performed by: FAMILY MEDICINE

## 2025-05-24 PROCEDURE — 84466 ASSAY OF TRANSFERRIN: CPT | Performed by: FAMILY MEDICINE

## 2025-05-24 PROCEDURE — 94761 N-INVAS EAR/PLS OXIMETRY MLT: CPT

## 2025-05-24 PROCEDURE — 63600175 PHARM REV CODE 636 W HCPCS

## 2025-05-24 PROCEDURE — 85025 COMPLETE CBC W/AUTO DIFF WBC: CPT | Performed by: EMERGENCY MEDICINE

## 2025-05-24 PROCEDURE — 94660 CPAP INITIATION&MGMT: CPT

## 2025-05-24 PROCEDURE — 63600175 PHARM REV CODE 636 W HCPCS: Mod: JZ,TB | Performed by: EMERGENCY MEDICINE

## 2025-05-24 PROCEDURE — 80053 COMPREHEN METABOLIC PANEL: CPT | Performed by: EMERGENCY MEDICINE

## 2025-05-24 PROCEDURE — 83605 ASSAY OF LACTIC ACID: CPT | Performed by: EMERGENCY MEDICINE

## 2025-05-24 PROCEDURE — 25000003 PHARM REV CODE 250: Performed by: FAMILY MEDICINE

## 2025-05-24 PROCEDURE — 87040 BLOOD CULTURE FOR BACTERIA: CPT | Performed by: EMERGENCY MEDICINE

## 2025-05-24 PROCEDURE — 96374 THER/PROPH/DIAG INJ IV PUSH: CPT

## 2025-05-24 PROCEDURE — U0002 COVID-19 LAB TEST NON-CDC: HCPCS | Performed by: EMERGENCY MEDICINE

## 2025-05-24 PROCEDURE — 85025 COMPLETE CBC W/AUTO DIFF WBC: CPT | Performed by: FAMILY MEDICINE

## 2025-05-24 PROCEDURE — 27000190 HC CPAP FULL FACE MASK W/VALVE

## 2025-05-24 PROCEDURE — 82728 ASSAY OF FERRITIN: CPT | Performed by: FAMILY MEDICINE

## 2025-05-24 PROCEDURE — 82746 ASSAY OF FOLIC ACID SERUM: CPT | Performed by: FAMILY MEDICINE

## 2025-05-24 RX ORDER — OXYCODONE AND ACETAMINOPHEN 5; 325 MG/1; MG/1
1 TABLET ORAL
Refills: 0 | Status: DISCONTINUED | OUTPATIENT
Start: 2025-05-24 | End: 2025-05-24

## 2025-05-24 RX ORDER — IPRATROPIUM BROMIDE AND ALBUTEROL SULFATE 2.5; .5 MG/3ML; MG/3ML
3 SOLUTION RESPIRATORY (INHALATION)
Status: DISCONTINUED | OUTPATIENT
Start: 2025-05-24 | End: 2025-05-26 | Stop reason: HOSPADM

## 2025-05-24 RX ORDER — AMOXICILLIN 250 MG
1 CAPSULE ORAL 2 TIMES DAILY
Status: DISCONTINUED | OUTPATIENT
Start: 2025-05-24 | End: 2025-05-26 | Stop reason: HOSPADM

## 2025-05-24 RX ORDER — SODIUM FERRIC GLUCONATE COMPLEX IN SUCROSE 12.5 MG/ML
125 INJECTION INTRAVENOUS ONCE
Status: COMPLETED | OUTPATIENT
Start: 2025-05-24 | End: 2025-05-24

## 2025-05-24 RX ORDER — ACETAMINOPHEN 325 MG/1
650 TABLET ORAL EVERY 6 HOURS PRN
Status: DISCONTINUED | OUTPATIENT
Start: 2025-05-24 | End: 2025-05-26 | Stop reason: HOSPADM

## 2025-05-24 RX ORDER — CARVEDILOL 12.5 MG/1
25 TABLET ORAL 2 TIMES DAILY WITH MEALS
Status: DISCONTINUED | OUTPATIENT
Start: 2025-05-24 | End: 2025-05-26 | Stop reason: HOSPADM

## 2025-05-24 RX ORDER — ISOSORBIDE MONONITRATE 30 MG/1
30 TABLET, EXTENDED RELEASE ORAL DAILY
Status: DISCONTINUED | OUTPATIENT
Start: 2025-05-24 | End: 2025-05-26 | Stop reason: HOSPADM

## 2025-05-24 RX ORDER — FUROSEMIDE 10 MG/ML
40 INJECTION INTRAMUSCULAR; INTRAVENOUS DAILY
Status: DISCONTINUED | OUTPATIENT
Start: 2025-05-24 | End: 2025-05-24

## 2025-05-24 RX ORDER — FUROSEMIDE 20 MG/1
20 TABLET ORAL DAILY
Status: DISCONTINUED | OUTPATIENT
Start: 2025-05-24 | End: 2025-05-25

## 2025-05-24 RX ORDER — CLOPIDOGREL BISULFATE 75 MG/1
75 TABLET ORAL DAILY
Status: DISCONTINUED | OUTPATIENT
Start: 2025-05-24 | End: 2025-05-26 | Stop reason: HOSPADM

## 2025-05-24 RX ORDER — PANTOPRAZOLE SODIUM 40 MG/1
40 TABLET, DELAYED RELEASE ORAL DAILY
Status: DISCONTINUED | OUTPATIENT
Start: 2025-05-24 | End: 2025-05-26 | Stop reason: HOSPADM

## 2025-05-24 RX ORDER — AMLODIPINE BESYLATE 5 MG/1
5 TABLET ORAL DAILY
Status: DISCONTINUED | OUTPATIENT
Start: 2025-05-24 | End: 2025-05-26 | Stop reason: HOSPADM

## 2025-05-24 RX ORDER — SODIUM CHLORIDE 0.9 % (FLUSH) 0.9 %
3 SYRINGE (ML) INJECTION
Status: DISCONTINUED | OUTPATIENT
Start: 2025-05-24 | End: 2025-05-26 | Stop reason: HOSPADM

## 2025-05-24 RX ORDER — TALC
6 POWDER (GRAM) TOPICAL NIGHTLY PRN
Status: DISCONTINUED | OUTPATIENT
Start: 2025-05-24 | End: 2025-05-26 | Stop reason: HOSPADM

## 2025-05-24 RX ORDER — HYDRALAZINE HYDROCHLORIDE 20 MG/ML
10 INJECTION INTRAMUSCULAR; INTRAVENOUS EVERY 6 HOURS PRN
Status: DISCONTINUED | OUTPATIENT
Start: 2025-05-24 | End: 2025-05-26 | Stop reason: HOSPADM

## 2025-05-24 RX ORDER — LANOLIN ALCOHOL/MO/W.PET/CERES
1 CREAM (GRAM) TOPICAL DAILY
Status: DISCONTINUED | OUTPATIENT
Start: 2025-05-24 | End: 2025-05-26 | Stop reason: HOSPADM

## 2025-05-24 RX ORDER — COLCHICINE 0.6 MG/1
0.6 TABLET, FILM COATED ORAL 2 TIMES DAILY
Status: DISCONTINUED | OUTPATIENT
Start: 2025-05-24 | End: 2025-05-24

## 2025-05-24 RX ORDER — FUROSEMIDE 10 MG/ML
40 INJECTION INTRAMUSCULAR; INTRAVENOUS
Status: COMPLETED | OUTPATIENT
Start: 2025-05-24 | End: 2025-05-24

## 2025-05-24 RX ORDER — GABAPENTIN 300 MG/1
300 CAPSULE ORAL NIGHTLY
Status: DISCONTINUED | OUTPATIENT
Start: 2025-05-24 | End: 2025-05-26 | Stop reason: HOSPADM

## 2025-05-24 RX ORDER — ONDANSETRON 4 MG/1
4 TABLET, ORALLY DISINTEGRATING ORAL EVERY 6 HOURS PRN
Status: DISCONTINUED | OUTPATIENT
Start: 2025-05-24 | End: 2025-05-26 | Stop reason: HOSPADM

## 2025-05-24 RX ORDER — SODIUM CHLORIDE 0.9 % (FLUSH) 0.9 %
10 SYRINGE (ML) INJECTION
Status: DISCONTINUED | OUTPATIENT
Start: 2025-05-24 | End: 2025-05-26 | Stop reason: HOSPADM

## 2025-05-24 RX ORDER — ALPRAZOLAM 0.5 MG/1
0.5 TABLET ORAL DAILY PRN
Status: DISCONTINUED | OUTPATIENT
Start: 2025-05-24 | End: 2025-05-26 | Stop reason: HOSPADM

## 2025-05-24 RX ORDER — CETIRIZINE HYDROCHLORIDE 5 MG/1
5 TABLET ORAL DAILY
Status: DISCONTINUED | OUTPATIENT
Start: 2025-05-24 | End: 2025-05-26 | Stop reason: HOSPADM

## 2025-05-24 RX ORDER — COLCHICINE 0.6 MG/1
0.6 TABLET, FILM COATED ORAL EVERY OTHER DAY
Status: DISCONTINUED | OUTPATIENT
Start: 2025-05-24 | End: 2025-05-25

## 2025-05-24 RX ORDER — ASPIRIN 81 MG/1
81 TABLET ORAL EVERY MORNING
Status: DISCONTINUED | OUTPATIENT
Start: 2025-05-24 | End: 2025-05-26 | Stop reason: HOSPADM

## 2025-05-24 RX ORDER — METHYLPREDNISOLONE SOD SUCC 125 MG
125 VIAL (EA) INJECTION
Status: COMPLETED | OUTPATIENT
Start: 2025-05-24 | End: 2025-05-24

## 2025-05-24 RX ORDER — IPRATROPIUM BROMIDE AND ALBUTEROL SULFATE 2.5; .5 MG/3ML; MG/3ML
3 SOLUTION RESPIRATORY (INHALATION)
Status: COMPLETED | OUTPATIENT
Start: 2025-05-24 | End: 2025-05-24

## 2025-05-24 RX ORDER — HYDROCODONE BITARTRATE AND ACETAMINOPHEN 5; 325 MG/1; MG/1
1 TABLET ORAL EVERY 6 HOURS PRN
Refills: 0 | Status: DISCONTINUED | OUTPATIENT
Start: 2025-05-24 | End: 2025-05-26 | Stop reason: HOSPADM

## 2025-05-24 RX ADMIN — METHYLPREDNISOLONE SODIUM SUCCINATE 125 MG: 125 INJECTION, POWDER, FOR SOLUTION INTRAMUSCULAR; INTRAVENOUS at 12:05

## 2025-05-24 RX ADMIN — CARVEDILOL 25 MG: 12.5 TABLET, FILM COATED ORAL at 08:05

## 2025-05-24 RX ADMIN — AMLODIPINE BESYLATE 5 MG: 5 TABLET ORAL at 08:05

## 2025-05-24 RX ADMIN — IPRATROPIUM BROMIDE AND ALBUTEROL SULFATE 3 ML: 2.5; .5 SOLUTION RESPIRATORY (INHALATION) at 12:05

## 2025-05-24 RX ADMIN — COLCHICINE 0.6 MG: 0.6 TABLET ORAL at 11:05

## 2025-05-24 RX ADMIN — SENNOSIDES AND DOCUSATE SODIUM 1 TABLET: 50; 8.6 TABLET ORAL at 11:05

## 2025-05-24 RX ADMIN — CETIRIZINE HYDROCHLORIDE 5 MG: 5 TABLET ORAL at 09:05

## 2025-05-24 RX ADMIN — IPRATROPIUM BROMIDE AND ALBUTEROL SULFATE 3 ML: 2.5; .5 SOLUTION RESPIRATORY (INHALATION) at 06:05

## 2025-05-24 RX ADMIN — METHYLPREDNISOLONE SODIUM SUCCINATE 40 MG: 40 INJECTION, POWDER, FOR SOLUTION INTRAMUSCULAR; INTRAVENOUS at 01:05

## 2025-05-24 RX ADMIN — CARVEDILOL 25 MG: 12.5 TABLET, FILM COATED ORAL at 05:05

## 2025-05-24 RX ADMIN — CLOPIDOGREL 75 MG: 75 TABLET ORAL at 08:05

## 2025-05-24 RX ADMIN — GABAPENTIN 300 MG: 300 CAPSULE ORAL at 09:05

## 2025-05-24 RX ADMIN — FERROUS SULFATE TAB 325 MG (65 MG ELEMENTAL FE) 1 EACH: 325 (65 FE) TAB at 11:05

## 2025-05-24 RX ADMIN — PANTOPRAZOLE SODIUM 40 MG: 40 TABLET, DELAYED RELEASE ORAL at 08:05

## 2025-05-24 RX ADMIN — METHYLPREDNISOLONE SODIUM SUCCINATE 40 MG: 40 INJECTION, POWDER, FOR SOLUTION INTRAMUSCULAR; INTRAVENOUS at 09:05

## 2025-05-24 RX ADMIN — SENNOSIDES AND DOCUSATE SODIUM 1 TABLET: 50; 8.6 TABLET ORAL at 09:05

## 2025-05-24 RX ADMIN — SODIUM FERRIC GLUCONATE COMPLEX IN SUCROSE 125 MG: 12.5 INJECTION INTRAVENOUS at 11:05

## 2025-05-24 RX ADMIN — ISOSORBIDE MONONITRATE 30 MG: 30 TABLET, EXTENDED RELEASE ORAL at 08:05

## 2025-05-24 RX ADMIN — MELATONIN TAB 3 MG 6 MG: 3 TAB at 09:05

## 2025-05-24 RX ADMIN — HYDROCODONE BITARTRATE AND ACETAMINOPHEN 1 TABLET: 5; 325 TABLET ORAL at 03:05

## 2025-05-24 RX ADMIN — ASPIRIN 81 MG: 81 TABLET, COATED ORAL at 06:05

## 2025-05-24 RX ADMIN — FUROSEMIDE 20 MG: 20 TABLET ORAL at 11:05

## 2025-05-24 RX ADMIN — NITROGLYCERIN 1 INCH: 20 OINTMENT TOPICAL at 12:05

## 2025-05-24 RX ADMIN — FUROSEMIDE 40 MG: 10 INJECTION, SOLUTION INTRAMUSCULAR; INTRAVENOUS at 01:05

## 2025-05-25 PROBLEM — D63.1 ANEMIA DUE TO STAGE 4 CHRONIC KIDNEY DISEASE: Status: ACTIVE | Noted: 2024-11-29

## 2025-05-25 PROBLEM — N18.4 ANEMIA DUE TO STAGE 4 CHRONIC KIDNEY DISEASE: Status: ACTIVE | Noted: 2024-11-29

## 2025-05-25 LAB
ABSOLUTE EOSINOPHIL (OHS): 0 K/UL
ABSOLUTE MONOCYTE (OHS): 0.29 K/UL (ref 0.3–1)
ABSOLUTE NEUTROPHIL COUNT (OHS): 3.92 K/UL (ref 1.8–7.7)
ANION GAP (OHS): 12 MMOL/L (ref 8–16)
BASOPHILS # BLD AUTO: 0.01 K/UL
BASOPHILS NFR BLD AUTO: 0.2 %
BUN SERPL-MCNC: 43 MG/DL (ref 8–23)
CALCIUM SERPL-MCNC: 9.8 MG/DL (ref 8.7–10.5)
CHLORIDE SERPL-SCNC: 104 MMOL/L (ref 95–110)
CO2 SERPL-SCNC: 22 MMOL/L (ref 23–29)
CREAT SERPL-MCNC: 1.9 MG/DL (ref 0.5–1.4)
ERYTHROCYTE [DISTWIDTH] IN BLOOD BY AUTOMATED COUNT: 16 % (ref 11.5–14.5)
GFR SERPLBLD CREATININE-BSD FMLA CKD-EPI: 27 ML/MIN/1.73/M2
GLUCOSE SERPL-MCNC: 208 MG/DL (ref 70–110)
HCT VFR BLD AUTO: 26.3 % (ref 37–48.5)
HGB BLD-MCNC: 8.8 GM/DL (ref 12–16)
IMM GRANULOCYTES # BLD AUTO: 0.05 K/UL (ref 0–0.04)
IMM GRANULOCYTES NFR BLD AUTO: 1.1 % (ref 0–0.5)
LYMPHOCYTES # BLD AUTO: 0.35 K/UL (ref 1–4.8)
MAGNESIUM SERPL-MCNC: 2 MG/DL (ref 1.6–2.6)
MCH RBC QN AUTO: 34.9 PG (ref 27–31)
MCHC RBC AUTO-ENTMCNC: 33.5 G/DL (ref 32–36)
MCV RBC AUTO: 104 FL (ref 82–98)
NUCLEATED RBC (/100WBC) (OHS): 1 /100 WBC
OHS QRS DURATION: 148 MS
OHS QTC CALCULATION: 492 MS
PLATELET # BLD AUTO: 171 K/UL (ref 150–450)
PMV BLD AUTO: 12 FL (ref 9.2–12.9)
POTASSIUM SERPL-SCNC: 3.1 MMOL/L (ref 3.5–5.1)
RBC # BLD AUTO: 2.52 M/UL (ref 4–5.4)
RELATIVE EOSINOPHIL (OHS): 0 %
RELATIVE LYMPHOCYTE (OHS): 7.6 % (ref 18–48)
RELATIVE MONOCYTE (OHS): 6.3 % (ref 4–15)
RELATIVE NEUTROPHIL (OHS): 84.8 % (ref 38–73)
SODIUM SERPL-SCNC: 138 MMOL/L (ref 136–145)
WBC # BLD AUTO: 4.62 K/UL (ref 3.9–12.7)

## 2025-05-25 PROCEDURE — 25000003 PHARM REV CODE 250: Performed by: FAMILY MEDICINE

## 2025-05-25 PROCEDURE — 63600175 PHARM REV CODE 636 W HCPCS: Mod: JZ,TB | Performed by: FAMILY MEDICINE

## 2025-05-25 PROCEDURE — 94761 N-INVAS EAR/PLS OXIMETRY MLT: CPT

## 2025-05-25 PROCEDURE — 27000221 HC OXYGEN, UP TO 24 HOURS

## 2025-05-25 PROCEDURE — 25000003 PHARM REV CODE 250: Performed by: EMERGENCY MEDICINE

## 2025-05-25 PROCEDURE — 63600175 PHARM REV CODE 636 W HCPCS

## 2025-05-25 PROCEDURE — 85025 COMPLETE CBC W/AUTO DIFF WBC: CPT | Performed by: FAMILY MEDICINE

## 2025-05-25 PROCEDURE — 99222 1ST HOSP IP/OBS MODERATE 55: CPT | Mod: ,,, | Performed by: INTERNAL MEDICINE

## 2025-05-25 PROCEDURE — 83735 ASSAY OF MAGNESIUM: CPT

## 2025-05-25 PROCEDURE — 94640 AIRWAY INHALATION TREATMENT: CPT

## 2025-05-25 PROCEDURE — 25000003 PHARM REV CODE 250: Performed by: NURSE PRACTITIONER

## 2025-05-25 PROCEDURE — 21400001 HC TELEMETRY ROOM

## 2025-05-25 PROCEDURE — 36415 COLL VENOUS BLD VENIPUNCTURE: CPT | Performed by: FAMILY MEDICINE

## 2025-05-25 PROCEDURE — 25000003 PHARM REV CODE 250

## 2025-05-25 PROCEDURE — 63600175 PHARM REV CODE 636 W HCPCS: Performed by: EMERGENCY MEDICINE

## 2025-05-25 PROCEDURE — 80048 BASIC METABOLIC PNL TOTAL CA: CPT | Performed by: FAMILY MEDICINE

## 2025-05-25 PROCEDURE — 99900035 HC TECH TIME PER 15 MIN (STAT)

## 2025-05-25 PROCEDURE — 25000242 PHARM REV CODE 250 ALT 637 W/ HCPCS: Performed by: FAMILY MEDICINE

## 2025-05-25 RX ORDER — POLYETHYLENE GLYCOL 3350 17 G/17G
17 POWDER, FOR SOLUTION ORAL 2 TIMES DAILY PRN
Status: DISCONTINUED | OUTPATIENT
Start: 2025-05-25 | End: 2025-05-26 | Stop reason: HOSPADM

## 2025-05-25 RX ORDER — ALUMINUM HYDROXIDE, MAGNESIUM HYDROXIDE, AND SIMETHICONE 1200; 120; 1200 MG/30ML; MG/30ML; MG/30ML
30 SUSPENSION ORAL 4 TIMES DAILY PRN
Status: DISCONTINUED | OUTPATIENT
Start: 2025-05-25 | End: 2025-05-26 | Stop reason: HOSPADM

## 2025-05-25 RX ORDER — PREDNISONE 20 MG/1
20 TABLET ORAL 2 TIMES DAILY
Status: DISCONTINUED | OUTPATIENT
Start: 2025-05-25 | End: 2025-05-26 | Stop reason: HOSPADM

## 2025-05-25 RX ORDER — SIMETHICONE 80 MG
1 TABLET,CHEWABLE ORAL 4 TIMES DAILY PRN
Status: DISCONTINUED | OUTPATIENT
Start: 2025-05-25 | End: 2025-05-26 | Stop reason: HOSPADM

## 2025-05-25 RX ORDER — LANOLIN ALCOHOL/MO/W.PET/CERES
800 CREAM (GRAM) TOPICAL
Status: DISCONTINUED | OUTPATIENT
Start: 2025-05-25 | End: 2025-05-26 | Stop reason: HOSPADM

## 2025-05-25 RX ORDER — PROCHLORPERAZINE EDISYLATE 5 MG/ML
5 INJECTION INTRAMUSCULAR; INTRAVENOUS EVERY 6 HOURS PRN
Status: DISCONTINUED | OUTPATIENT
Start: 2025-05-25 | End: 2025-05-26 | Stop reason: HOSPADM

## 2025-05-25 RX ORDER — SODIUM,POTASSIUM PHOSPHATES 280-250MG
2 POWDER IN PACKET (EA) ORAL
Status: DISCONTINUED | OUTPATIENT
Start: 2025-05-25 | End: 2025-05-26 | Stop reason: HOSPADM

## 2025-05-25 RX ORDER — SODIUM CHLORIDE 0.9 % (FLUSH) 0.9 %
10 SYRINGE (ML) INJECTION EVERY 8 HOURS PRN
Status: DISCONTINUED | OUTPATIENT
Start: 2025-05-25 | End: 2025-05-26 | Stop reason: HOSPADM

## 2025-05-25 RX ORDER — ALLOPURINOL 100 MG/1
100 TABLET ORAL DAILY
Qty: 30 TABLET | Refills: 0 | Status: SHIPPED | OUTPATIENT
Start: 2025-05-25 | End: 2025-06-24

## 2025-05-25 RX ORDER — COLCHICINE 0.6 MG/1
0.6 TABLET, FILM COATED ORAL 2 TIMES DAILY
Status: DISCONTINUED | OUTPATIENT
Start: 2025-05-25 | End: 2025-05-25 | Stop reason: ALTCHOICE

## 2025-05-25 RX ORDER — SODIUM FERRIC GLUCONATE COMPLEX IN SUCROSE 12.5 MG/ML
125 INJECTION INTRAVENOUS DAILY
Status: DISCONTINUED | OUTPATIENT
Start: 2025-05-25 | End: 2025-05-25

## 2025-05-25 RX ADMIN — SENNOSIDES AND DOCUSATE SODIUM 1 TABLET: 50; 8.6 TABLET ORAL at 08:05

## 2025-05-25 RX ADMIN — CETIRIZINE HYDROCHLORIDE 5 MG: 5 TABLET ORAL at 08:05

## 2025-05-25 RX ADMIN — CARVEDILOL 25 MG: 12.5 TABLET, FILM COATED ORAL at 05:05

## 2025-05-25 RX ADMIN — SODIUM CHLORIDE 125 MG: 9 INJECTION, SOLUTION INTRAVENOUS at 01:05

## 2025-05-25 RX ADMIN — FERROUS SULFATE TAB 325 MG (65 MG ELEMENTAL FE) 1 EACH: 325 (65 FE) TAB at 08:05

## 2025-05-25 RX ADMIN — CLOPIDOGREL 75 MG: 75 TABLET ORAL at 08:05

## 2025-05-25 RX ADMIN — IPRATROPIUM BROMIDE AND ALBUTEROL SULFATE 3 ML: 2.5; .5 SOLUTION RESPIRATORY (INHALATION) at 08:05

## 2025-05-25 RX ADMIN — PANTOPRAZOLE SODIUM 40 MG: 40 TABLET, DELAYED RELEASE ORAL at 08:05

## 2025-05-25 RX ADMIN — MELATONIN TAB 3 MG 6 MG: 3 TAB at 08:05

## 2025-05-25 RX ADMIN — METHYLPREDNISOLONE SODIUM SUCCINATE 40 MG: 40 INJECTION, POWDER, FOR SOLUTION INTRAMUSCULAR; INTRAVENOUS at 05:05

## 2025-05-25 RX ADMIN — HYDROCODONE BITARTRATE AND ACETAMINOPHEN 1 TABLET: 5; 325 TABLET ORAL at 05:05

## 2025-05-25 RX ADMIN — CARVEDILOL 25 MG: 12.5 TABLET, FILM COATED ORAL at 08:05

## 2025-05-25 RX ADMIN — PREDNISONE 20 MG: 20 TABLET ORAL at 08:05

## 2025-05-25 RX ADMIN — PREDNISONE 20 MG: 20 TABLET ORAL at 01:05

## 2025-05-25 RX ADMIN — ASPIRIN 81 MG: 81 TABLET, COATED ORAL at 07:05

## 2025-05-25 RX ADMIN — ISOSORBIDE MONONITRATE 30 MG: 30 TABLET, EXTENDED RELEASE ORAL at 08:05

## 2025-05-25 RX ADMIN — IPRATROPIUM BROMIDE AND ALBUTEROL SULFATE 3 ML: 2.5; .5 SOLUTION RESPIRATORY (INHALATION) at 01:05

## 2025-05-25 RX ADMIN — GABAPENTIN 300 MG: 300 CAPSULE ORAL at 08:05

## 2025-05-25 RX ADMIN — FUROSEMIDE 20 MG: 20 TABLET ORAL at 08:05

## 2025-05-26 VITALS
TEMPERATURE: 98 F | RESPIRATION RATE: 20 BRPM | WEIGHT: 175.06 LBS | DIASTOLIC BLOOD PRESSURE: 70 MMHG | SYSTOLIC BLOOD PRESSURE: 166 MMHG | HEART RATE: 63 BPM | BODY MASS INDEX: 31.02 KG/M2 | OXYGEN SATURATION: 91 % | HEIGHT: 63 IN

## 2025-05-26 PROBLEM — M10.9 ACUTE GOUT OF LEFT FOOT: Status: ACTIVE | Noted: 2025-05-26

## 2025-05-26 PROBLEM — M10.9 ACUTE GOUT OF LEFT FOOT: Status: RESOLVED | Noted: 2025-05-26 | Resolved: 2025-05-26

## 2025-05-26 PROBLEM — I50.33 ACUTE ON CHRONIC DIASTOLIC CONGESTIVE HEART FAILURE: Status: ACTIVE | Noted: 2025-05-26

## 2025-05-26 PROBLEM — I50.9 ACUTE ON CHRONIC CONGESTIVE HEART FAILURE: Status: RESOLVED | Noted: 2024-11-29 | Resolved: 2025-05-26

## 2025-05-26 PROBLEM — J44.1 ACUTE EXACERBATION OF CHRONIC OBSTRUCTIVE PULMONARY DISEASE (COPD): Status: RESOLVED | Noted: 2024-11-29 | Resolved: 2025-05-26

## 2025-05-26 PROBLEM — J96.01 ACUTE RESPIRATORY FAILURE WITH HYPOXIA: Status: RESOLVED | Noted: 2024-11-29 | Resolved: 2025-05-26

## 2025-05-26 LAB
ABSOLUTE EOSINOPHIL (OHS): 0 K/UL
ABSOLUTE MONOCYTE (OHS): 0.65 K/UL (ref 0.3–1)
ABSOLUTE NEUTROPHIL COUNT (OHS): 4.46 K/UL (ref 1.8–7.7)
ANION GAP (OHS): 13 MMOL/L (ref 8–16)
BASOPHILS # BLD AUTO: 0.01 K/UL
BASOPHILS NFR BLD AUTO: 0.2 %
BUN SERPL-MCNC: 48 MG/DL (ref 8–23)
CALCIUM SERPL-MCNC: 9.7 MG/DL (ref 8.7–10.5)
CHLORIDE SERPL-SCNC: 106 MMOL/L (ref 95–110)
CO2 SERPL-SCNC: 23 MMOL/L (ref 23–29)
CREAT SERPL-MCNC: 1.7 MG/DL (ref 0.5–1.4)
ERYTHROCYTE [DISTWIDTH] IN BLOOD BY AUTOMATED COUNT: 15.9 % (ref 11.5–14.5)
GFR SERPLBLD CREATININE-BSD FMLA CKD-EPI: 31 ML/MIN/1.73/M2
GLUCOSE SERPL-MCNC: 151 MG/DL (ref 70–110)
HCT VFR BLD AUTO: 28.5 % (ref 37–48.5)
HGB BLD-MCNC: 9.5 GM/DL (ref 12–16)
IMM GRANULOCYTES # BLD AUTO: 0.06 K/UL (ref 0–0.04)
IMM GRANULOCYTES NFR BLD AUTO: 1.1 % (ref 0–0.5)
LYMPHOCYTES # BLD AUTO: 0.46 K/UL (ref 1–4.8)
MCH RBC QN AUTO: 35.2 PG (ref 27–31)
MCHC RBC AUTO-ENTMCNC: 33.3 G/DL (ref 32–36)
MCV RBC AUTO: 106 FL (ref 82–98)
NUCLEATED RBC (/100WBC) (OHS): 2 /100 WBC
OHS QRS DURATION: 118 MS
OHS QTC CALCULATION: 436 MS
PLATELET # BLD AUTO: 178 K/UL (ref 150–450)
PMV BLD AUTO: 11.2 FL (ref 9.2–12.9)
POTASSIUM SERPL-SCNC: 3.3 MMOL/L (ref 3.5–5.1)
RBC # BLD AUTO: 2.7 M/UL (ref 4–5.4)
RELATIVE EOSINOPHIL (OHS): 0 %
RELATIVE LYMPHOCYTE (OHS): 8.2 % (ref 18–48)
RELATIVE MONOCYTE (OHS): 11.5 % (ref 4–15)
RELATIVE NEUTROPHIL (OHS): 79 % (ref 38–73)
SODIUM SERPL-SCNC: 142 MMOL/L (ref 136–145)
WBC # BLD AUTO: 5.64 K/UL (ref 3.9–12.7)

## 2025-05-26 PROCEDURE — 97165 OT EVAL LOW COMPLEX 30 MIN: CPT

## 2025-05-26 PROCEDURE — 97162 PT EVAL MOD COMPLEX 30 MIN: CPT

## 2025-05-26 PROCEDURE — 25000003 PHARM REV CODE 250: Performed by: FAMILY MEDICINE

## 2025-05-26 PROCEDURE — 25000003 PHARM REV CODE 250

## 2025-05-26 PROCEDURE — 94640 AIRWAY INHALATION TREATMENT: CPT

## 2025-05-26 PROCEDURE — 63600175 PHARM REV CODE 636 W HCPCS

## 2025-05-26 PROCEDURE — 25000242 PHARM REV CODE 250 ALT 637 W/ HCPCS: Performed by: FAMILY MEDICINE

## 2025-05-26 PROCEDURE — 80048 BASIC METABOLIC PNL TOTAL CA: CPT | Performed by: FAMILY MEDICINE

## 2025-05-26 PROCEDURE — 94761 N-INVAS EAR/PLS OXIMETRY MLT: CPT

## 2025-05-26 PROCEDURE — 85025 COMPLETE CBC W/AUTO DIFF WBC: CPT | Performed by: FAMILY MEDICINE

## 2025-05-26 PROCEDURE — 97166 OT EVAL MOD COMPLEX 45 MIN: CPT

## 2025-05-26 PROCEDURE — 25000003 PHARM REV CODE 250: Performed by: EMERGENCY MEDICINE

## 2025-05-26 PROCEDURE — 97116 GAIT TRAINING THERAPY: CPT

## 2025-05-26 PROCEDURE — 97530 THERAPEUTIC ACTIVITIES: CPT

## 2025-05-26 PROCEDURE — 36415 COLL VENOUS BLD VENIPUNCTURE: CPT | Performed by: FAMILY MEDICINE

## 2025-05-26 PROCEDURE — 99232 SBSQ HOSP IP/OBS MODERATE 35: CPT | Mod: ,,, | Performed by: PHYSICIAN ASSISTANT

## 2025-05-26 RX ORDER — FERROUS SULFATE 325(65) MG
325 TABLET, DELAYED RELEASE (ENTERIC COATED) ORAL DAILY
Qty: 30 TABLET | Refills: 2 | Status: SHIPPED | OUTPATIENT
Start: 2025-05-26

## 2025-05-26 RX ORDER — PREDNISONE 10 MG/1
10 TABLET ORAL 2 TIMES DAILY
Qty: 10 TABLET | Refills: 0 | Status: SHIPPED | OUTPATIENT
Start: 2025-05-26

## 2025-05-26 RX ORDER — COLCHICINE 0.6 MG/1
0.6 TABLET ORAL DAILY PRN
Qty: 15 TABLET | Refills: 1 | Status: SHIPPED | OUTPATIENT
Start: 2025-05-26 | End: 2026-05-26

## 2025-05-26 RX ADMIN — PREDNISONE 20 MG: 20 TABLET ORAL at 08:05

## 2025-05-26 RX ADMIN — CARVEDILOL 25 MG: 12.5 TABLET, FILM COATED ORAL at 08:05

## 2025-05-26 RX ADMIN — IPRATROPIUM BROMIDE AND ALBUTEROL SULFATE 3 ML: 2.5; .5 SOLUTION RESPIRATORY (INHALATION) at 08:05

## 2025-05-26 RX ADMIN — ALPRAZOLAM 0.5 MG: 0.5 TABLET ORAL at 02:05

## 2025-05-26 RX ADMIN — CETIRIZINE HYDROCHLORIDE 5 MG: 5 TABLET ORAL at 08:05

## 2025-05-26 RX ADMIN — POTASSIUM BICARBONATE 50 MEQ: 978 TABLET, EFFERVESCENT ORAL at 11:05

## 2025-05-26 RX ADMIN — PANTOPRAZOLE SODIUM 40 MG: 40 TABLET, DELAYED RELEASE ORAL at 08:05

## 2025-05-26 RX ADMIN — CLOPIDOGREL 75 MG: 75 TABLET ORAL at 08:05

## 2025-05-26 RX ADMIN — AMLODIPINE BESYLATE 5 MG: 5 TABLET ORAL at 08:05

## 2025-05-26 RX ADMIN — ISOSORBIDE MONONITRATE 30 MG: 30 TABLET, EXTENDED RELEASE ORAL at 08:05

## 2025-05-26 RX ADMIN — FERROUS SULFATE TAB 325 MG (65 MG ELEMENTAL FE) 1 EACH: 325 (65 FE) TAB at 08:05

## 2025-05-26 RX ADMIN — ASPIRIN 81 MG: 81 TABLET, COATED ORAL at 08:05

## 2025-05-27 PROBLEM — R06.02 SOB (SHORTNESS OF BREATH): Status: ACTIVE | Noted: 2025-05-27

## 2025-05-28 ENCOUNTER — TELEPHONE (OUTPATIENT)
Dept: CARDIOLOGY | Facility: CLINIC | Age: 80
End: 2025-05-28
Payer: MEDICARE

## 2025-05-28 NOTE — TELEPHONE ENCOUNTER
[facility]) hospital discharge 48-72 hour phone follow up completed.       Pt reports the following:  []  Shortness of Breath with Activity  []  Shortness of Breath at rest   []  Fatigue  []  Edema   [] Chest pain or tightness  [] Weight Increase since discharge  [x] None of the above    Medications:   Discharge medication reviewed with pt.  Pt reports having medication list available and has all medications at home for use per list.     Education:    Reviewed key points as listed below.     Recommend 2 -3 gram sodium restriction and 1500 cc-2000 cc fluid restriction.  Encourage physical activity with graded exercise program.  Requested patient to weigh themselves daily, and to notify us if their weight increases by more than 3 lbs in 1 day or 5 lbs in 3 days.     Watch for these Signs and Symptoms: If any of these occur, contact [facility] immediately:   Increase in shortness of breath with movement   Increase in swelling in your legs and ankles   Weight gain of more than 3 pounds in a day or 5 pounds in 3 days.   Difficulty breathing when you are lying down   Worsening fatigue or tiredness   Stomach bloating, a full feeling or a loss of appetite   Increased coughing--especially when you are lying down      [facility] ([facility]) Team notified of pt referral via [facility] (automated inbasket notification) .      Pt was deemed not a candidate for enrollment at this time related to pt has outside cardiologist Dr Gama. Declined appt with [facility] at this time.     Pt will require additional follow up planning per primary team.     If pt status, diagnosis, or treatment plan changes , please place AMB referral to [facility] Failure [facility] ([id number]).

## 2025-05-29 LAB
BACTERIA BLD CULT: NORMAL
BACTERIA BLD CULT: NORMAL
GRAM STN SPEC: NORMAL

## (undated) DEVICE — KIT GLIDESHEATH SLEND 6FR 10CM

## (undated) DEVICE — GUIDEWIRE SV-5 ST .018IN 300CM

## (undated) DEVICE — BAND TR COMP DEVICE REG 24CM

## (undated) DEVICE — CATH JL3.5 5FR

## (undated) DEVICE — CATH JR4 5FR

## (undated) DEVICE — PACK HEART CATH BR

## (undated) DEVICE — OMNIPAQUE 300MG 150ML VIAL

## (undated) DEVICE — GUIDEWIRE WHOLEY HI TORQ 175CM

## (undated) DEVICE — CATH PIG145 INFINITI 5X110CM

## (undated) DEVICE — CATH CV QD LUMN 6FRX110CM

## (undated) DEVICE — CATH OPTITORQUE TIGER 5F 100CM

## (undated) DEVICE — KIT SYR REUSABLE

## (undated) DEVICE — GUIDEWIRE STD .035X180CM ANG

## (undated) DEVICE — ANGIOTOUCH KIT

## (undated) DEVICE — WIRE GUIDE TEFLON 3CM .035 145

## (undated) DEVICE — GUIDE WIRE J-TIP 260CM .025

## (undated) DEVICE — DRAPE ANGIO BRACH 38X44IN

## (undated) DEVICE — KIT INTRODUCER STIFFEN MICRO